# Patient Record
Sex: FEMALE | Race: WHITE
[De-identification: names, ages, dates, MRNs, and addresses within clinical notes are randomized per-mention and may not be internally consistent; named-entity substitution may affect disease eponyms.]

---

## 2017-03-03 ENCOUNTER — APPOINTMENT (OUTPATIENT)
Dept: CARDIOLOGY | Facility: CLINIC | Age: 72
End: 2017-03-03

## 2017-03-03 VITALS — DIASTOLIC BLOOD PRESSURE: 62 MMHG | SYSTOLIC BLOOD PRESSURE: 112 MMHG

## 2017-08-11 ENCOUNTER — APPOINTMENT (OUTPATIENT)
Dept: CARDIOLOGY | Facility: CLINIC | Age: 72
End: 2017-08-11

## 2017-08-11 VITALS — DIASTOLIC BLOOD PRESSURE: 82 MMHG | SYSTOLIC BLOOD PRESSURE: 124 MMHG

## 2017-08-11 VITALS — BODY MASS INDEX: 22.83 KG/M2 | WEIGHT: 133 LBS

## 2018-01-26 ENCOUNTER — INPATIENT (INPATIENT)
Facility: HOSPITAL | Age: 73
LOS: 3 days | Discharge: HOME | End: 2018-01-30
Attending: INTERNAL MEDICINE

## 2018-01-26 ENCOUNTER — APPOINTMENT (OUTPATIENT)
Dept: CARDIOLOGY | Facility: CLINIC | Age: 73
End: 2018-01-26

## 2018-01-26 VITALS
BODY MASS INDEX: 23.22 KG/M2 | HEART RATE: 58 BPM | DIASTOLIC BLOOD PRESSURE: 80 MMHG | HEIGHT: 64 IN | SYSTOLIC BLOOD PRESSURE: 144 MMHG | WEIGHT: 136 LBS

## 2018-02-02 DIAGNOSIS — I48.92 UNSPECIFIED ATRIAL FLUTTER: ICD-10-CM

## 2018-02-02 DIAGNOSIS — Z98.890 OTHER SPECIFIED POSTPROCEDURAL STATES: ICD-10-CM

## 2018-02-02 DIAGNOSIS — I10 ESSENTIAL (PRIMARY) HYPERTENSION: ICD-10-CM

## 2018-02-02 DIAGNOSIS — I45.19 OTHER RIGHT BUNDLE-BRANCH BLOCK: ICD-10-CM

## 2018-02-02 DIAGNOSIS — E78.5 HYPERLIPIDEMIA, UNSPECIFIED: ICD-10-CM

## 2018-02-02 DIAGNOSIS — I44.2 ATRIOVENTRICULAR BLOCK, COMPLETE: ICD-10-CM

## 2018-02-02 DIAGNOSIS — I49.5 SICK SINUS SYNDROME: ICD-10-CM

## 2018-02-02 DIAGNOSIS — Z79.01 LONG TERM (CURRENT) USE OF ANTICOAGULANTS: ICD-10-CM

## 2018-02-04 DIAGNOSIS — I48.91 UNSPECIFIED ATRIAL FIBRILLATION: ICD-10-CM

## 2018-02-04 DIAGNOSIS — I45.9 CONDUCTION DISORDER, UNSPECIFIED: ICD-10-CM

## 2018-02-13 ENCOUNTER — APPOINTMENT (OUTPATIENT)
Dept: CARDIOLOGY | Facility: CLINIC | Age: 73
End: 2018-02-13

## 2018-02-13 VITALS — SYSTOLIC BLOOD PRESSURE: 104 MMHG | DIASTOLIC BLOOD PRESSURE: 60 MMHG

## 2018-03-15 ENCOUNTER — APPOINTMENT (OUTPATIENT)
Dept: CARDIOLOGY | Facility: CLINIC | Age: 73
End: 2018-03-15

## 2018-03-15 VITALS — DIASTOLIC BLOOD PRESSURE: 70 MMHG | SYSTOLIC BLOOD PRESSURE: 138 MMHG

## 2018-03-19 ENCOUNTER — APPOINTMENT (OUTPATIENT)
Dept: CARDIOLOGY | Facility: CLINIC | Age: 73
End: 2018-03-19

## 2018-05-10 PROBLEM — Z98.890 S/P MITRAL VALVE REPAIR: Status: ACTIVE | Noted: 2018-05-10

## 2018-05-10 PROBLEM — I34.0 MITRAL VALVE INSUFFICIENCY: Status: ACTIVE | Noted: 2018-05-10

## 2018-05-14 ENCOUNTER — FORM ENCOUNTER (OUTPATIENT)
Age: 73
End: 2018-05-14

## 2018-05-15 ENCOUNTER — APPOINTMENT (OUTPATIENT)
Dept: CARDIOTHORACIC SURGERY | Facility: CLINIC | Age: 73
End: 2018-05-15
Payer: MEDICARE

## 2018-05-15 ENCOUNTER — OUTPATIENT (OUTPATIENT)
Dept: OUTPATIENT SERVICES | Facility: HOSPITAL | Age: 73
LOS: 1 days | End: 2018-05-15
Payer: MEDICARE

## 2018-05-15 VITALS
OXYGEN SATURATION: 96 % | RESPIRATION RATE: 18 BRPM | DIASTOLIC BLOOD PRESSURE: 70 MMHG | HEIGHT: 62 IN | BODY MASS INDEX: 25.58 KG/M2 | WEIGHT: 139 LBS | TEMPERATURE: 96.9 F | SYSTOLIC BLOOD PRESSURE: 128 MMHG | HEART RATE: 65 BPM

## 2018-05-15 DIAGNOSIS — I34.0 NONRHEUMATIC MITRAL (VALVE) INSUFFICIENCY: ICD-10-CM

## 2018-05-15 DIAGNOSIS — I05.1 RHEUMATIC MITRAL INSUFFICIENCY: ICD-10-CM

## 2018-05-15 DIAGNOSIS — Z98.890 OTHER SPECIFIED POSTPROCEDURAL STATES: ICD-10-CM

## 2018-05-15 PROCEDURE — 99205 OFFICE O/P NEW HI 60 MIN: CPT

## 2018-05-15 PROCEDURE — 93320 DOPPLER ECHO COMPLETE: CPT | Mod: 26

## 2018-05-15 PROCEDURE — 93312 ECHO TRANSESOPHAGEAL: CPT | Mod: 26

## 2018-05-15 PROCEDURE — 93312 ECHO TRANSESOPHAGEAL: CPT

## 2018-05-15 PROCEDURE — 93325 DOPPLER ECHO COLOR FLOW MAPG: CPT | Mod: 26

## 2018-05-24 ENCOUNTER — OUTPATIENT (OUTPATIENT)
Dept: OUTPATIENT SERVICES | Facility: HOSPITAL | Age: 73
LOS: 1 days | Discharge: HOME | End: 2018-05-24

## 2018-05-24 DIAGNOSIS — R06.00 DYSPNEA, UNSPECIFIED: ICD-10-CM

## 2018-06-26 ENCOUNTER — APPOINTMENT (OUTPATIENT)
Dept: CARDIOLOGY | Facility: CLINIC | Age: 73
End: 2018-06-26

## 2018-06-26 VITALS
DIASTOLIC BLOOD PRESSURE: 76 MMHG | BODY MASS INDEX: 25.61 KG/M2 | SYSTOLIC BLOOD PRESSURE: 116 MMHG | WEIGHT: 140 LBS | HEART RATE: 64 BPM | OXYGEN SATURATION: 97 %

## 2018-06-27 ENCOUNTER — APPOINTMENT (OUTPATIENT)
Dept: CARDIOLOGY | Facility: CLINIC | Age: 73
End: 2018-06-27

## 2018-06-27 VITALS — DIASTOLIC BLOOD PRESSURE: 74 MMHG | OXYGEN SATURATION: 96 % | SYSTOLIC BLOOD PRESSURE: 128 MMHG | HEART RATE: 59 BPM

## 2018-08-03 ENCOUNTER — APPOINTMENT (OUTPATIENT)
Dept: CARDIOLOGY | Facility: CLINIC | Age: 73
End: 2018-08-03

## 2018-08-03 VITALS
SYSTOLIC BLOOD PRESSURE: 154 MMHG | HEIGHT: 62 IN | HEART RATE: 62 BPM | DIASTOLIC BLOOD PRESSURE: 78 MMHG | OXYGEN SATURATION: 98 % | BODY MASS INDEX: 25.76 KG/M2 | WEIGHT: 140 LBS

## 2018-08-08 ENCOUNTER — OUTPATIENT (OUTPATIENT)
Dept: OUTPATIENT SERVICES | Facility: HOSPITAL | Age: 73
LOS: 1 days | Discharge: HOME | End: 2018-08-08

## 2018-08-08 DIAGNOSIS — R07.9 CHEST PAIN, UNSPECIFIED: ICD-10-CM

## 2018-08-08 DIAGNOSIS — Z95.0 PRESENCE OF CARDIAC PACEMAKER: ICD-10-CM

## 2018-08-08 DIAGNOSIS — R06.02 SHORTNESS OF BREATH: ICD-10-CM

## 2018-08-22 DIAGNOSIS — R00.0 TACHYCARDIA, UNSPECIFIED: ICD-10-CM

## 2018-10-23 ENCOUNTER — CLINICAL ADVICE (OUTPATIENT)
Age: 73
End: 2018-10-23

## 2019-02-15 ENCOUNTER — APPOINTMENT (OUTPATIENT)
Dept: CARDIOLOGY | Facility: CLINIC | Age: 74
End: 2019-02-15

## 2019-02-15 VITALS — DIASTOLIC BLOOD PRESSURE: 80 MMHG | SYSTOLIC BLOOD PRESSURE: 138 MMHG

## 2019-02-15 DIAGNOSIS — E78.00 PURE HYPERCHOLESTEROLEMIA, UNSPECIFIED: ICD-10-CM

## 2019-02-15 RX ORDER — METOPROLOL TARTRATE 25 MG/1
25 TABLET, FILM COATED ORAL AT BEDTIME
Refills: 0 | Status: DISCONTINUED | COMMUNITY
End: 2019-02-15

## 2019-02-15 NOTE — PHYSICAL EXAM
[General Appearance - Well Developed] : well developed [Normal Appearance] : normal appearance [Well Groomed] : well groomed [General Appearance - Well Nourished] : well nourished [No Deformities] : no deformities [General Appearance - In No Acute Distress] : no acute distress [Heart Rate And Rhythm] : heart rate and rhythm were normal [Heart Sounds] : normal S1 and S2 [Murmurs] : no murmurs present [Respiration, Rhythm And Depth] : normal respiratory rhythm and effort [Exaggerated Use Of Accessory Muscles For Inspiration] : no accessory muscle use [Auscultation Breath Sounds / Voice Sounds] : lungs were clear to auscultation bilaterally [Abdomen Soft] : soft [Abdomen Tenderness] : non-tender [Abdomen Mass (___ Cm)] : no abdominal mass palpated [Nail Clubbing] : no clubbing of the fingernails [Cyanosis, Localized] : no localized cyanosis [Petechial Hemorrhages (___cm)] : no petechial hemorrhages [] : no ischemic changes [Normal Conjunctiva] : the conjunctiva exhibited no abnormalities [Eyelids - No Xanthelasma] : the eyelids demonstrated no xanthelasmas [Normal Oral Mucosa] : normal oral mucosa [No Oral Pallor] : no oral pallor [No Oral Cyanosis] : no oral cyanosis [Normal Jugular Venous A Waves Present] : normal jugular venous A waves present [Normal Jugular Venous V Waves Present] : normal jugular venous V waves present [No Jugular Venous White A Waves] : no jugular venous white A waves [Abnormal Walk] : normal gait [Gait - Sufficient For Exercise Testing] : the gait was sufficient for exercise testing

## 2019-02-15 NOTE — HISTORY OF PRESENT ILLNESS
[None] : The patient complains of no symptoms [SOB] : dyspnea [Palpitations] : no palpitations [Erythema at Site] : no erythema at device site [Swelling at Site] : no swelling at device site [de-identified] : Patient with history of MVR MAZE, AF/AFL s/p AF ablation x2. Patient is doing great on flecanide; In NSR. Pt came in for frequent eye bleeding. However pt developed SOB, dizziness. Pt found to have intermittent  CHB. Pt was highly asymptotic. \par \par Pt is doing very well. C/o some SOB and SZYMANSKI\par \par CTA 8/18 - normal\par \par creatine 1.03\par \par \par \par Nuclear 6/17/17 - normal EF 60%

## 2019-02-15 NOTE — PROCEDURE
[No] : not [2nd Degree Block] : second degree block [Pacemaker] : pacemaker [DDDR] : DDDR [Longevity: ___ months] : The estimated remaining battery life is [unfilled] months [Threshold Testing Performed] : Threshold testing was performed [Lead Imp:  ___ohms] : lead impedance was [unfilled] ohms [Sensing Amplitude ___mv] : sensing amplitude was [unfilled] mv [___V @] : [unfilled] V [___ ms] : [unfilled] ms [None] : none [Pace ___ %] : Pace [unfilled]% [de-identified] : [facility] [de-identified] : L311 [de-identified] : 60 [de-identified] : Normal function. several short SVT/AT

## 2019-02-15 NOTE — ASSESSMENT
[FreeTextEntry1] : Patient with history of MVR MAZE, AF/AFL s/p AF ablation x2. Patient is doing great on flecanide and meteopolol\par \par SOB - no further arrhythmia\par - pt FU with pulmonary

## 2019-08-09 ENCOUNTER — APPOINTMENT (OUTPATIENT)
Dept: CARDIOLOGY | Facility: CLINIC | Age: 74
End: 2019-08-09
Payer: MEDICARE

## 2019-08-09 VITALS — BODY MASS INDEX: 24.33 KG/M2 | WEIGHT: 133 LBS | DIASTOLIC BLOOD PRESSURE: 71 MMHG | SYSTOLIC BLOOD PRESSURE: 138 MMHG

## 2019-08-09 PROCEDURE — 99213 OFFICE O/P EST LOW 20 MIN: CPT

## 2019-08-09 PROCEDURE — 93280 PM DEVICE PROGR EVAL DUAL: CPT

## 2019-09-08 NOTE — PHYSICAL EXAM
[General Appearance - Well Developed] : well developed [Normal Appearance] : normal appearance [Well Groomed] : well groomed [No Deformities] : no deformities [General Appearance - Well Nourished] : well nourished [General Appearance - In No Acute Distress] : no acute distress [Heart Rate And Rhythm] : heart rate and rhythm were normal [Heart Sounds] : normal S1 and S2 [Murmurs] : no murmurs present [Respiration, Rhythm And Depth] : normal respiratory rhythm and effort [Exaggerated Use Of Accessory Muscles For Inspiration] : no accessory muscle use [Auscultation Breath Sounds / Voice Sounds] : lungs were clear to auscultation bilaterally [Abdomen Soft] : soft [Abdomen Tenderness] : non-tender [Abdomen Mass (___ Cm)] : no abdominal mass palpated [Nail Clubbing] : no clubbing of the fingernails [Cyanosis, Localized] : no localized cyanosis [Petechial Hemorrhages (___cm)] : no petechial hemorrhages [] : no ischemic changes [Eyelids - No Xanthelasma] : the eyelids demonstrated no xanthelasmas [Normal Conjunctiva] : the conjunctiva exhibited no abnormalities [Normal Oral Mucosa] : normal oral mucosa [No Oral Pallor] : no oral pallor [No Oral Cyanosis] : no oral cyanosis [Normal Jugular Venous A Waves Present] : normal jugular venous A waves present [Normal Jugular Venous V Waves Present] : normal jugular venous V waves present [No Jugular Venous White A Waves] : no jugular venous white A waves [Abnormal Walk] : normal gait [Gait - Sufficient For Exercise Testing] : the gait was sufficient for exercise testing [Left Infraclavicular] : left infraclavicular area [Clean] : clean [Dry] : dry [Erythema] : not erythematous

## 2019-09-08 NOTE — PROCEDURE
[No] : not [Pacemaker] : pacemaker [Sinus Bradycardia] : sinus bradycardia [DDDR] : DDDR [Magnet Rate: ___ Ppm] : magnet rate was [unfilled] Ppm [Longevity: ___ months] : The estimated remaining battery life is [unfilled] months [Threshold Testing Performed] : Threshold testing was performed [Lead Imp:  ___ohms] : lead impedance was [unfilled] ohms [Sensing Amplitude ___mv] : sensing amplitude was [unfilled] mv [___V @] : [unfilled] V [___ ms] : [unfilled] ms [Counters Reset] : the counters were reset [Asense-Vpace ___ %] : Asense-Vpace [unfilled]% [Apace-Vsense ___ %] : Apace-Vsense [unfilled]% [Programmed for Longevity] : output reprogrammed for improved battery longevity [de-identified] : [facility]  [de-identified] : L311 [de-identified] : [id number] [de-identified] : 01/29/2018 [de-identified] : Normal Device function

## 2019-09-08 NOTE — HISTORY OF PRESENT ILLNESS
pt arrives w/ c/o chest pain and SOB x 3 days. pt states diagnosed with bronchitis and placed on abx but states no improvement and symptoms getting worst. pt states hx of asthma took her inhalers with no relief and still feels like she is wheezing. pt labs sent resp even and unlabored. waiting further orders will continue to monitor. [None] : The patient complains of no symptoms [SOB] : dyspnea [Palpitations] : no palpitations [Erythema at Site] : no erythema at device site [Swelling at Site] : no swelling at device site [de-identified] : Cardiologist: Dr. Taylor\par \par Patient with history of MVR MAZE, AF/AFL s/p AF ablation x2. Patient is doing great on flecanide . Had a dual chamber PPM implanted for symptomatic   intermittent  CHB on  1/29/2018 \par \par Pt is doing very well. C/o same SOB and SZYMANSKI\par \par CTA 8/18 - normal\par \par creatine 1.03\par \par \par \par Nuclear 6/17/17 - normal EF 60%

## 2019-09-08 NOTE — REVIEW OF SYSTEMS
[Negative] : Heme/Lymph [Recent Weight Loss (___ Lbs)] : recent [unfilled] ~Ulb weight loss [Dyspnea on exertion] : dyspnea during exertion [Shortness Of Breath] : no shortness of breath

## 2019-09-08 NOTE — ASSESSMENT
[FreeTextEntry1] : Patient with history of MVR MAZE, AF/AFL s/p AF ablation x2. Patient is doing great on flecanide and meteopolol\par \par SOB - no further arrhythmia\par - pt FU with pulmonary - Dr. Christopher Sayed - put her on inhaler- help somewhat\par Device interrogation showed normal device function\par no events\par c/w with home monitoring\par c/w with xarelto - no symptoms of bleeding\par follow up 6 momths\par

## 2019-09-24 ENCOUNTER — APPOINTMENT (OUTPATIENT)
Dept: CARDIOLOGY | Facility: CLINIC | Age: 74
End: 2019-09-24
Payer: MEDICARE

## 2019-09-24 PROCEDURE — 93296 REM INTERROG EVL PM/IDS: CPT

## 2019-09-24 PROCEDURE — 93294 REM INTERROG EVL PM/LDLS PM: CPT

## 2019-12-24 ENCOUNTER — APPOINTMENT (OUTPATIENT)
Dept: CARDIOLOGY | Facility: CLINIC | Age: 74
End: 2019-12-24
Payer: MEDICARE

## 2019-12-24 PROCEDURE — 93294 REM INTERROG EVL PM/LDLS PM: CPT

## 2019-12-24 PROCEDURE — 93296 REM INTERROG EVL PM/IDS: CPT

## 2020-03-24 ENCOUNTER — APPOINTMENT (OUTPATIENT)
Dept: CARDIOLOGY | Facility: CLINIC | Age: 75
End: 2020-03-24
Payer: MEDICARE

## 2020-03-24 PROCEDURE — 93296 REM INTERROG EVL PM/IDS: CPT

## 2020-03-24 PROCEDURE — 93294 REM INTERROG EVL PM/LDLS PM: CPT

## 2020-05-01 ENCOUNTER — APPOINTMENT (OUTPATIENT)
Dept: CARDIOLOGY | Facility: CLINIC | Age: 75
End: 2020-05-01

## 2020-05-01 ENCOUNTER — APPOINTMENT (OUTPATIENT)
Dept: CARDIOLOGY | Facility: CLINIC | Age: 75
End: 2020-05-01
Payer: MEDICARE

## 2020-05-01 DIAGNOSIS — I10 ESSENTIAL (PRIMARY) HYPERTENSION: ICD-10-CM

## 2020-05-01 PROCEDURE — 99213 OFFICE O/P EST LOW 20 MIN: CPT | Mod: 95

## 2020-05-01 NOTE — HISTORY OF PRESENT ILLNESS
[None] : The patient complains of no symptoms [Palpitations] : no palpitations [Erythema at Site] : no erythema at device site [SOB] : no dyspnea [Swelling at Site] : no swelling at device site [de-identified] : Ms. CURTIS WATERS has given me verbal authorization to provide the tele services\par Verbal consent given on 05/01/2020  by the patient.\par \par This visit was provided via telehealth using real-time 2-way audio visual technology. The patient,  Ms. CURTIS WATERS,   was located at home,  [address] [place] [address]\[place] [phone], at the time of the visit. \par \par The patient, Ms. CURTIS WATERS  and Provider participated in the telehealth encounter. \par \par I have spent 16 minutes speaking with or face-to-face discussing\par \par \par \par \par Cardiologist: Dr. Taylor\par \par Patient with history of MVR MAZE, AF/AFL s/p AF ablation x2. Patient is doing great on flecanide . Had a dual chamber PPM implanted for symptomatic   intermittent  CHB on  1/29/2018 \par \par Pt is doing very well. C/o same SOB and SZYMANSKI, no bleeding\par \par CTA 8/18 - normal\par labs reviewed\par \par \par \par Nuclear 6/17/17 - normal EF 60%

## 2020-05-01 NOTE — REVIEW OF SYSTEMS
[Shortness Of Breath] : no shortness of breath [Recent Weight Loss (___ Lbs)] : recent [unfilled] ~Ulb weight loss [Dyspnea on exertion] : dyspnea during exertion [Negative] : Heme/Lymph

## 2020-05-01 NOTE — PHYSICAL EXAM
[General Appearance - Well Developed] : well developed [Normal Appearance] : normal appearance [General Appearance - Well Nourished] : well nourished [Well Groomed] : well groomed [General Appearance - In No Acute Distress] : no acute distress [No Deformities] : no deformities [Normal Conjunctiva] : the conjunctiva exhibited no abnormalities [Normal Oral Mucosa] : normal oral mucosa [Eyelids - No Xanthelasma] : the eyelids demonstrated no xanthelasmas [No Oral Pallor] : no oral pallor [No Oral Cyanosis] : no oral cyanosis [Normal Jugular Venous A Waves Present] : normal jugular venous A waves present [No Jugular Venous White A Waves] : no jugular venous white A waves [Normal Jugular Venous V Waves Present] : normal jugular venous V waves present [Abdomen Tenderness] : non-tender [Abdomen Soft] : soft [Abdomen Mass (___ Cm)] : no abdominal mass palpated [Nail Clubbing] : no clubbing of the fingernails [Cyanosis, Localized] : no localized cyanosis [Petechial Hemorrhages (___cm)] : no petechial hemorrhages [] : no ischemic changes [Left Infraclavicular] : left infraclavicular area [Dry] : dry [Clean] : clean [Erythema] : not erythematous

## 2020-05-01 NOTE — ASSESSMENT
[FreeTextEntry1] : Patient with history of MVR MAZE, AF/AFL s/p AF ablation x2. Patient is doing great on flecanide and meteopolol\par \par SOB - no further arrhythmia\par - pt FU with pulmonary - Dr. Ashwin Zurita \par Device interrogation showed normal device function\par no events; no AF\par c/w with home monitoring\par c/w with xarelto - no symptoms of bleeding\par follow up 6 momths\par

## 2020-06-16 LAB
ALBUMIN SERPL ELPH-MCNC: 4 G/DL
ALBUMIN SERPL ELPH-MCNC: 4.3 G/DL
ALP BLD-CCNC: 70 U/L
ALP BLD-CCNC: 75 U/L
ALT SERPL-CCNC: 21 U/L
ALT SERPL-CCNC: 25 U/L
ANION GAP SERPL CALC-SCNC: 14 MMOL/L
ANION GAP SERPL CALC-SCNC: 19 MMOL/L
AST SERPL-CCNC: 23 U/L
AST SERPL-CCNC: 24 U/L
BASOPHILS # BLD AUTO: 0.07 K/UL
BASOPHILS # BLD AUTO: 0.09 K/UL
BASOPHILS NFR BLD AUTO: 0.9 %
BASOPHILS NFR BLD AUTO: 1.1 %
BILIRUB SERPL-MCNC: 0.5 MG/DL
BILIRUB SERPL-MCNC: 0.7 MG/DL
BUN SERPL-MCNC: 22 MG/DL
BUN SERPL-MCNC: 23 MG/DL
CALCIUM SERPL-MCNC: 9.1 MG/DL
CALCIUM SERPL-MCNC: 9.6 MG/DL
CHLORIDE SERPL-SCNC: 100 MMOL/L
CHLORIDE SERPL-SCNC: 103 MMOL/L
CHOLEST SERPL-MCNC: 124 MG/DL
CHOLEST SERPL-MCNC: 129 MG/DL
CHOLEST/HDLC SERPL: 2.3 RATIO
CHOLEST/HDLC SERPL: 2.5 RATIO
CO2 SERPL-SCNC: 21 MMOL/L
CO2 SERPL-SCNC: 23 MMOL/L
CREAT SERPL-MCNC: 1 MG/DL
CREAT SERPL-MCNC: 1.3 MG/DL
EOSINOPHIL # BLD AUTO: 0.09 K/UL
EOSINOPHIL # BLD AUTO: 0.13 K/UL
EOSINOPHIL NFR BLD AUTO: 1.2 %
EOSINOPHIL NFR BLD AUTO: 1.6 %
GLUCOSE SERPL-MCNC: 86 MG/DL
GLUCOSE SERPL-MCNC: 94 MG/DL
HCT VFR BLD CALC: 38.2 %
HCT VFR BLD CALC: 42.1 %
HDLC SERPL-MCNC: 50 MG/DL
HDLC SERPL-MCNC: 55 MG/DL
HGB BLD-MCNC: 12.3 G/DL
HGB BLD-MCNC: 12.9 G/DL
IMM GRANULOCYTES NFR BLD AUTO: 0.1 %
IMM GRANULOCYTES NFR BLD AUTO: 0.1 %
LDLC SERPL CALC-MCNC: 60 MG/DL
LDLC SERPL CALC-MCNC: 61 MG/DL
LYMPHOCYTES # BLD AUTO: 2.08 K/UL
LYMPHOCYTES # BLD AUTO: 3.43 K/UL
LYMPHOCYTES NFR BLD AUTO: 27.3 %
LYMPHOCYTES NFR BLD AUTO: 42.5 %
MAN DIFF?: NORMAL
MAN DIFF?: NORMAL
MCHC RBC-ENTMCNC: 30.6 G/DL
MCHC RBC-ENTMCNC: 30.9 PG
MCHC RBC-ENTMCNC: 31 PG
MCHC RBC-ENTMCNC: 32.2 G/DL
MCV RBC AUTO: 100.7 FL
MCV RBC AUTO: 96.2 FL
MONOCYTES # BLD AUTO: 0.68 K/UL
MONOCYTES # BLD AUTO: 0.78 K/UL
MONOCYTES NFR BLD AUTO: 8.9 %
MONOCYTES NFR BLD AUTO: 9.7 %
NEUTROPHILS # BLD AUTO: 3.64 K/UL
NEUTROPHILS # BLD AUTO: 4.69 K/UL
NEUTROPHILS NFR BLD AUTO: 45 %
NEUTROPHILS NFR BLD AUTO: 61.6 %
PLATELET # BLD AUTO: 185 K/UL
PLATELET # BLD AUTO: 217 K/UL
POTASSIUM SERPL-SCNC: 4.7 MMOL/L
POTASSIUM SERPL-SCNC: 4.8 MMOL/L
PROT SERPL-MCNC: 6.8 G/DL
PROT SERPL-MCNC: 6.8 G/DL
RBC # BLD: 3.97 M/UL
RBC # BLD: 4.18 M/UL
RBC # FLD: 13.9 %
RBC # FLD: 14.2 %
SODIUM SERPL-SCNC: 137 MMOL/L
SODIUM SERPL-SCNC: 143 MMOL/L
TRIGL SERPL-MCNC: 100 MG/DL
TRIGL SERPL-MCNC: 112 MG/DL
WBC # FLD AUTO: 7.62 K/UL
WBC # FLD AUTO: 8.08 K/UL

## 2020-06-23 ENCOUNTER — APPOINTMENT (OUTPATIENT)
Dept: CARDIOLOGY | Facility: CLINIC | Age: 75
End: 2020-06-23
Payer: MEDICARE

## 2020-06-23 PROCEDURE — 93296 REM INTERROG EVL PM/IDS: CPT

## 2020-06-23 PROCEDURE — 93294 REM INTERROG EVL PM/LDLS PM: CPT

## 2020-09-22 ENCOUNTER — APPOINTMENT (OUTPATIENT)
Dept: CARDIOLOGY | Facility: CLINIC | Age: 75
End: 2020-09-22
Payer: MEDICARE

## 2020-09-22 PROCEDURE — 93296 REM INTERROG EVL PM/IDS: CPT

## 2020-09-22 PROCEDURE — 93294 REM INTERROG EVL PM/LDLS PM: CPT

## 2020-11-17 ENCOUNTER — APPOINTMENT (OUTPATIENT)
Dept: CARDIOLOGY | Facility: CLINIC | Age: 75
End: 2020-11-17
Payer: MEDICARE

## 2020-11-17 VITALS
TEMPERATURE: 97.3 F | WEIGHT: 135 LBS | DIASTOLIC BLOOD PRESSURE: 79 MMHG | BODY MASS INDEX: 23.05 KG/M2 | SYSTOLIC BLOOD PRESSURE: 125 MMHG | HEIGHT: 64 IN

## 2020-11-17 PROCEDURE — 93280 PM DEVICE PROGR EVAL DUAL: CPT

## 2020-11-17 PROCEDURE — 99213 OFFICE O/P EST LOW 20 MIN: CPT

## 2020-11-17 RX ORDER — FLUTICASONE FUROATE 100 UG/1
100 POWDER RESPIRATORY (INHALATION) DAILY
Refills: 0 | Status: COMPLETED | COMMUNITY
End: 2020-11-17

## 2020-11-17 NOTE — PROCEDURE
[See Scanned Paceart Report] : See scanned paceart report [See Device Printout] : See device printout [Pacemaker] : pacemaker [de-identified] : [place] [de-identified] : no AF

## 2020-11-17 NOTE — HISTORY OF PRESENT ILLNESS
[None] : The patient complains of no symptoms [Palpitations] : no palpitations [SOB] : no dyspnea [Erythema at Site] : no erythema at device site [Swelling at Site] : no swelling at device site [de-identified] : Cardiologist: Dr. Taylor\par \par Patient with history of MVR MAZE, AF/AFL s/p AF ablation x2. Patient is doing great on flecanide . Had a dual chamber PPM implanted for symptomatic   intermittent  CHB on  1/29/2018 \par \par Pt is doing very well. C/o same SOB and SZYMANSKI, no bleeding\par \par CTA 8/18 - normal\par labs reviewed\par \par \par \par Nuclear 6/17/17 - normal EF 60%

## 2020-11-17 NOTE — REVIEW OF SYSTEMS
[Recent Weight Loss (___ Lbs)] : recent [unfilled] ~Ulb weight loss [Dyspnea on exertion] : dyspnea during exertion [Negative] : Heme/Lymph [Shortness Of Breath] : no shortness of breath

## 2020-11-17 NOTE — PHYSICAL EXAM
[General Appearance - Well Developed] : well developed [Normal Appearance] : normal appearance [Well Groomed] : well groomed [General Appearance - Well Nourished] : well nourished [No Deformities] : no deformities [General Appearance - In No Acute Distress] : no acute distress [Left Infraclavicular] : left infraclavicular area [Clean] : clean [Dry] : dry [Abdomen Soft] : soft [Abdomen Tenderness] : non-tender [Abdomen Mass (___ Cm)] : no abdominal mass palpated [Nail Clubbing] : no clubbing of the fingernails [Cyanosis, Localized] : no localized cyanosis [Petechial Hemorrhages (___cm)] : no petechial hemorrhages [Normal Conjunctiva] : the conjunctiva exhibited no abnormalities [Eyelids - No Xanthelasma] : the eyelids demonstrated no xanthelasmas [Normal Oral Mucosa] : normal oral mucosa [No Oral Pallor] : no oral pallor [No Oral Cyanosis] : no oral cyanosis [Normal Jugular Venous A Waves Present] : normal jugular venous A waves present [Normal Jugular Venous V Waves Present] : normal jugular venous V waves present [No Jugular Venous White A Waves] : no jugular venous white A waves [Heart Rate And Rhythm] : heart rate and rhythm were normal [Heart Sounds] : normal S1 and S2 [Murmurs] : no murmurs present [] : no respiratory distress [Respiration, Rhythm And Depth] : normal respiratory rhythm and effort [Exaggerated Use Of Accessory Muscles For Inspiration] : no accessory muscle use [Auscultation Breath Sounds / Voice Sounds] : lungs were clear to auscultation bilaterally [Erythema] : not erythematous

## 2020-11-17 NOTE — ASSESSMENT
[FreeTextEntry1] : Patient with history of MVR MAZE, AF/AFL s/p AF ablation x2. Patient is doing great on flecanide and meteopolol\par \par SOB - no further arrhythmia\par - pt FU with pulmonary - Dr. Christopher Sayjakub \par Device interrogation showed normal device function\par AF - No AF\par - cont AAM\par - labs reviewed

## 2021-02-12 ENCOUNTER — APPOINTMENT (OUTPATIENT)
Dept: CARDIOLOGY | Facility: CLINIC | Age: 76
End: 2021-02-12
Payer: MEDICARE

## 2021-02-12 PROCEDURE — 93294 REM INTERROG EVL PM/LDLS PM: CPT

## 2021-02-12 PROCEDURE — 93296 REM INTERROG EVL PM/IDS: CPT

## 2021-03-05 ENCOUNTER — NON-APPOINTMENT (OUTPATIENT)
Age: 76
End: 2021-03-05

## 2021-04-13 DIAGNOSIS — J45.909 UNSPECIFIED ASTHMA, UNCOMPLICATED: ICD-10-CM

## 2021-05-18 ENCOUNTER — APPOINTMENT (OUTPATIENT)
Dept: CARDIOLOGY | Facility: CLINIC | Age: 76
End: 2021-05-18
Payer: MEDICARE

## 2021-05-18 ENCOUNTER — NON-APPOINTMENT (OUTPATIENT)
Age: 76
End: 2021-05-18

## 2021-05-18 PROCEDURE — 93294 REM INTERROG EVL PM/LDLS PM: CPT

## 2021-05-18 PROCEDURE — 93296 REM INTERROG EVL PM/IDS: CPT

## 2021-08-17 ENCOUNTER — APPOINTMENT (OUTPATIENT)
Dept: CARDIOLOGY | Facility: CLINIC | Age: 76
End: 2021-08-17
Payer: MEDICARE

## 2021-08-17 ENCOUNTER — NON-APPOINTMENT (OUTPATIENT)
Age: 76
End: 2021-08-17

## 2021-08-17 PROCEDURE — 93294 REM INTERROG EVL PM/LDLS PM: CPT

## 2021-08-17 PROCEDURE — 93296 REM INTERROG EVL PM/IDS: CPT

## 2021-10-08 ENCOUNTER — APPOINTMENT (OUTPATIENT)
Dept: CARDIOLOGY | Facility: CLINIC | Age: 76
End: 2021-10-08
Payer: MEDICARE

## 2021-10-08 VITALS
WEIGHT: 134 LBS | SYSTOLIC BLOOD PRESSURE: 139 MMHG | DIASTOLIC BLOOD PRESSURE: 73 MMHG | OXYGEN SATURATION: 99 % | BODY MASS INDEX: 22.88 KG/M2 | RESPIRATION RATE: 16 BRPM | HEART RATE: 68 BPM | TEMPERATURE: 98 F | HEIGHT: 64 IN

## 2021-10-08 PROCEDURE — 93280 PM DEVICE PROGR EVAL DUAL: CPT

## 2021-10-08 RX ORDER — FLUTICASONE FUROATE AND VILANTEROL TRIFENATATE 100; 25 UG/1; UG/1
100-25 POWDER RESPIRATORY (INHALATION) DAILY
Qty: 3 | Refills: 3 | Status: DISCONTINUED | COMMUNITY
Start: 2021-04-13 | End: 2021-10-08

## 2021-10-08 NOTE — HISTORY OF PRESENT ILLNESS
[Palpitations] : no palpitations [SOB] : no dyspnea [Erythema at Site] : no erythema at device site [Swelling at Site] : no swelling at device site [de-identified] : Pt returns for routine device interrogation\par \par \par   [FreeTextEntry1] : 76 y/o with pmh of  MVR MAZE, AF/AFL s/p AF ablation x2, recurrent Afib/ Fl , rhythm controlled with  Flecainide, CHB , she underwent a Dual Chamber PPM implantation on 1/29/2018 \par \par \par Nuclear 6/17/17 - normal EF 60%\par JAXSON (5/15/2018)- EF 55-60%, mod AR, mild MR , mild - mod TR\par \par PCP: Dr. Cota ( [phone]\par Cardiologist:  Dr. Christian\par

## 2021-10-08 NOTE — PROCEDURE
[No] : not [2nd Degree Block] : second degree block [Pacemaker] : pacemaker [DDDR] : DDDR [Threshold Testing Performed] : Threshold testing was performed [None] : none [Counters Reset] : the counters were reset [Pace ___ %] : Pace [unfilled]% [Longevity: ___ months] : The estimated remaining battery life is [unfilled] months [___ ms] : [unfilled] ms [Lead Imp:  ___ohms] : lead impedance was [unfilled] ohms [Sensing Amplitude ___mv] : sensing amplitude was [unfilled] mv [___V @] : [unfilled] V [de-identified] : [employer] [de-identified] : L311 [de-identified] : [id number] [de-identified] : 1/29/2018 [de-identified] : 60 [de-identified] : Normal function. several short SVT/AT

## 2021-10-08 NOTE — ASSESSMENT
[FreeTextEntry1] : PPM interrogation today showed : Normal Dual Chamber PPM function, All parameters stable, baseline rhythm - NSR  . NO arrhythmias recorded . \par   AP 29%\par    38%\par . \par On Xarelto for CVA prevention, no s/s bleeding reported \par \par Plan \par -Continue current medication regimen \par -Continue remote monitoring\par - BW will be done by Dr. Cota\par   will request copy of May 2021 result\par - She is scheduled for TTE with Dr. Ching next month\par - RTO in 9 mos\par \par

## 2021-10-08 NOTE — PHYSICAL EXAM
[General Appearance - Well Developed] : well developed [Normal Appearance] : normal appearance [Well Groomed] : well groomed [General Appearance - Well Nourished] : well nourished [No Deformities] : no deformities [General Appearance - In No Acute Distress] : no acute distress [Heart Rate And Rhythm] : heart rate and rhythm were normal [Heart Sounds] : normal S1 and S2 [Murmurs] : no murmurs present [Respiration, Rhythm And Depth] : normal respiratory rhythm and effort [Exaggerated Use Of Accessory Muscles For Inspiration] : no accessory muscle use [Auscultation Breath Sounds / Voice Sounds] : lungs were clear to auscultation bilaterally [Left Infraclavicular] : left infraclavicular area [Clean] : clean [Dry] : dry [Well-Healed] : well-healed [Abdomen Soft] : soft [Abdomen Tenderness] : non-tender [Abdomen Mass (___ Cm)] : no abdominal mass palpated [Nail Clubbing] : no clubbing of the fingernails [Cyanosis, Localized] : no localized cyanosis [Petechial Hemorrhages (___cm)] : no petechial hemorrhages [] : no ischemic changes [Normal Conjunctiva] : the conjunctiva exhibited no abnormalities [Eyelids - No Xanthelasma] : the eyelids demonstrated no xanthelasmas [Normal Oral Mucosa] : normal oral mucosa [No Oral Pallor] : no oral pallor [No Oral Cyanosis] : no oral cyanosis [Normal Jugular Venous A Waves Present] : normal jugular venous A waves present [Normal Jugular Venous V Waves Present] : normal jugular venous V waves present [No Jugular Venous White A Waves] : no jugular venous white A waves [Abnormal Walk] : normal gait [Gait - Sufficient For Exercise Testing] : the gait was sufficient for exercise testing

## 2022-01-10 ENCOUNTER — NON-APPOINTMENT (OUTPATIENT)
Age: 77
End: 2022-01-10

## 2022-01-10 ENCOUNTER — APPOINTMENT (OUTPATIENT)
Dept: CARDIOLOGY | Facility: CLINIC | Age: 77
End: 2022-01-10
Payer: MEDICARE

## 2022-01-10 PROCEDURE — 93296 REM INTERROG EVL PM/IDS: CPT

## 2022-01-10 PROCEDURE — 93294 REM INTERROG EVL PM/LDLS PM: CPT

## 2022-02-25 ENCOUNTER — EMERGENCY (EMERGENCY)
Facility: HOSPITAL | Age: 77
LOS: 0 days | Discharge: HOME | End: 2022-02-26
Attending: EMERGENCY MEDICINE | Admitting: EMERGENCY MEDICINE
Payer: MEDICARE

## 2022-02-25 VITALS
OXYGEN SATURATION: 95 % | DIASTOLIC BLOOD PRESSURE: 79 MMHG | RESPIRATION RATE: 16 BRPM | WEIGHT: 134.92 LBS | HEART RATE: 69 BPM | SYSTOLIC BLOOD PRESSURE: 194 MMHG | TEMPERATURE: 99 F

## 2022-02-25 DIAGNOSIS — Z95.0 PRESENCE OF CARDIAC PACEMAKER: ICD-10-CM

## 2022-02-25 DIAGNOSIS — H55.00 UNSPECIFIED NYSTAGMUS: ICD-10-CM

## 2022-02-25 DIAGNOSIS — Z79.01 LONG TERM (CURRENT) USE OF ANTICOAGULANTS: ICD-10-CM

## 2022-02-25 DIAGNOSIS — H81.399 OTHER PERIPHERAL VERTIGO, UNSPECIFIED EAR: ICD-10-CM

## 2022-02-25 DIAGNOSIS — R11.0 NAUSEA: ICD-10-CM

## 2022-02-25 DIAGNOSIS — R42 DIZZINESS AND GIDDINESS: ICD-10-CM

## 2022-02-25 DIAGNOSIS — I10 ESSENTIAL (PRIMARY) HYPERTENSION: ICD-10-CM

## 2022-02-25 DIAGNOSIS — R11.2 NAUSEA WITH VOMITING, UNSPECIFIED: ICD-10-CM

## 2022-02-25 DIAGNOSIS — E78.5 HYPERLIPIDEMIA, UNSPECIFIED: ICD-10-CM

## 2022-02-25 DIAGNOSIS — I48.91 UNSPECIFIED ATRIAL FIBRILLATION: ICD-10-CM

## 2022-02-25 PROCEDURE — 93010 ELECTROCARDIOGRAM REPORT: CPT

## 2022-02-25 PROCEDURE — 99285 EMERGENCY DEPT VISIT HI MDM: CPT

## 2022-02-26 VITALS
HEART RATE: 87 BPM | SYSTOLIC BLOOD PRESSURE: 187 MMHG | RESPIRATION RATE: 17 BRPM | OXYGEN SATURATION: 98 % | DIASTOLIC BLOOD PRESSURE: 68 MMHG

## 2022-02-26 LAB
ALBUMIN SERPL ELPH-MCNC: 4.3 G/DL — SIGNIFICANT CHANGE UP (ref 3.5–5.2)
ALP SERPL-CCNC: 79 U/L — SIGNIFICANT CHANGE UP (ref 30–115)
ALT FLD-CCNC: 25 U/L — SIGNIFICANT CHANGE UP (ref 0–41)
ANION GAP SERPL CALC-SCNC: 14 MMOL/L — SIGNIFICANT CHANGE UP (ref 7–14)
APTT BLD: 28 SEC — SIGNIFICANT CHANGE UP (ref 27–39.2)
AST SERPL-CCNC: 27 U/L — SIGNIFICANT CHANGE UP (ref 0–41)
BASOPHILS # BLD AUTO: 0.06 K/UL — SIGNIFICANT CHANGE UP (ref 0–0.2)
BASOPHILS NFR BLD AUTO: 0.7 % — SIGNIFICANT CHANGE UP (ref 0–1)
BILIRUB SERPL-MCNC: 0.4 MG/DL — SIGNIFICANT CHANGE UP (ref 0.2–1.2)
BUN SERPL-MCNC: 33 MG/DL — HIGH (ref 10–20)
CALCIUM SERPL-MCNC: 9.5 MG/DL — SIGNIFICANT CHANGE UP (ref 8.5–10.1)
CHLORIDE SERPL-SCNC: 100 MMOL/L — SIGNIFICANT CHANGE UP (ref 98–110)
CO2 SERPL-SCNC: 22 MMOL/L — SIGNIFICANT CHANGE UP (ref 17–32)
CREAT SERPL-MCNC: 1.2 MG/DL — SIGNIFICANT CHANGE UP (ref 0.7–1.5)
EOSINOPHIL # BLD AUTO: 0.17 K/UL — SIGNIFICANT CHANGE UP (ref 0–0.7)
EOSINOPHIL NFR BLD AUTO: 1.9 % — SIGNIFICANT CHANGE UP (ref 0–8)
GLUCOSE SERPL-MCNC: 130 MG/DL — HIGH (ref 70–99)
HCT VFR BLD CALC: 35.9 % — LOW (ref 37–47)
HGB BLD-MCNC: 11.6 G/DL — LOW (ref 12–16)
IMM GRANULOCYTES NFR BLD AUTO: 0.3 % — SIGNIFICANT CHANGE UP (ref 0.1–0.3)
INR BLD: 1 RATIO — SIGNIFICANT CHANGE UP (ref 0.65–1.3)
LYMPHOCYTES # BLD AUTO: 2.74 K/UL — SIGNIFICANT CHANGE UP (ref 1.2–3.4)
LYMPHOCYTES # BLD AUTO: 30.2 % — SIGNIFICANT CHANGE UP (ref 20.5–51.1)
MAGNESIUM SERPL-MCNC: 2 MG/DL — SIGNIFICANT CHANGE UP (ref 1.8–2.4)
MCHC RBC-ENTMCNC: 30.7 PG — SIGNIFICANT CHANGE UP (ref 27–31)
MCHC RBC-ENTMCNC: 32.3 G/DL — SIGNIFICANT CHANGE UP (ref 32–37)
MCV RBC AUTO: 95 FL — SIGNIFICANT CHANGE UP (ref 81–99)
MONOCYTES # BLD AUTO: 0.65 K/UL — HIGH (ref 0.1–0.6)
MONOCYTES NFR BLD AUTO: 7.2 % — SIGNIFICANT CHANGE UP (ref 1.7–9.3)
NEUTROPHILS # BLD AUTO: 5.41 K/UL — SIGNIFICANT CHANGE UP (ref 1.4–6.5)
NEUTROPHILS NFR BLD AUTO: 59.7 % — SIGNIFICANT CHANGE UP (ref 42.2–75.2)
NRBC # BLD: 0 /100 WBCS — SIGNIFICANT CHANGE UP (ref 0–0)
PLATELET # BLD AUTO: 207 K/UL — SIGNIFICANT CHANGE UP (ref 130–400)
POTASSIUM SERPL-MCNC: 4.3 MMOL/L — SIGNIFICANT CHANGE UP (ref 3.5–5)
POTASSIUM SERPL-SCNC: 4.3 MMOL/L — SIGNIFICANT CHANGE UP (ref 3.5–5)
PROT SERPL-MCNC: 6.7 G/DL — SIGNIFICANT CHANGE UP (ref 6–8)
PROTHROM AB SERPL-ACNC: 11.5 SEC — SIGNIFICANT CHANGE UP (ref 9.95–12.87)
RBC # BLD: 3.78 M/UL — LOW (ref 4.2–5.4)
RBC # FLD: 13.7 % — SIGNIFICANT CHANGE UP (ref 11.5–14.5)
SODIUM SERPL-SCNC: 136 MMOL/L — SIGNIFICANT CHANGE UP (ref 135–146)
TROPONIN T SERPL-MCNC: <0.01 NG/ML — SIGNIFICANT CHANGE UP
WBC # BLD: 9.06 K/UL — SIGNIFICANT CHANGE UP (ref 4.8–10.8)
WBC # FLD AUTO: 9.06 K/UL — SIGNIFICANT CHANGE UP (ref 4.8–10.8)

## 2022-02-26 PROCEDURE — 70450 CT HEAD/BRAIN W/O DYE: CPT | Mod: 26,MA

## 2022-02-26 RX ORDER — METOCLOPRAMIDE HCL 10 MG
10 TABLET ORAL ONCE
Refills: 0 | Status: COMPLETED | OUTPATIENT
Start: 2022-02-26 | End: 2022-02-26

## 2022-02-26 RX ORDER — ONDANSETRON 8 MG/1
4 TABLET, FILM COATED ORAL ONCE
Refills: 0 | Status: COMPLETED | OUTPATIENT
Start: 2022-02-26 | End: 2022-02-26

## 2022-02-26 RX ORDER — SODIUM CHLORIDE 9 MG/ML
1000 INJECTION INTRAMUSCULAR; INTRAVENOUS; SUBCUTANEOUS ONCE
Refills: 0 | Status: COMPLETED | OUTPATIENT
Start: 2022-02-26 | End: 2022-02-26

## 2022-02-26 RX ORDER — MECLIZINE HCL 12.5 MG
1 TABLET ORAL
Qty: 21 | Refills: 0
Start: 2022-02-26 | End: 2022-03-04

## 2022-02-26 RX ORDER — MECLIZINE HCL 12.5 MG
50 TABLET ORAL ONCE
Refills: 0 | Status: DISCONTINUED | OUTPATIENT
Start: 2022-02-26 | End: 2022-02-26

## 2022-02-26 RX ADMIN — Medication 10 MILLIGRAM(S): at 00:38

## 2022-02-26 RX ADMIN — SODIUM CHLORIDE 1000 MILLILITER(S): 9 INJECTION INTRAMUSCULAR; INTRAVENOUS; SUBCUTANEOUS at 00:39

## 2022-02-26 RX ADMIN — ONDANSETRON 4 MILLIGRAM(S): 8 TABLET, FILM COATED ORAL at 00:38

## 2022-02-26 NOTE — ED PROVIDER NOTE - CLINICAL SUMMARY MEDICAL DECISION MAKING FREE TEXT BOX
76-year-old female history of hypertension dyslipidemia A. fib on Xarelto presenting for acute onset dizziness/room spinning sensation this evening.  Also associated with nausea and vomiting.  Symptoms worse with movement.  No hearing changes.  No numbness or focal weakness.  No fevers or chills, hearing changes.  Uncomfortable appearing, NAD, non toxic. NCAT PERRLA + horizontal nystagmus EOMI neck supple non tender normal wob cta bl rrr abdomen s nt nd no rebound no guarding WWPx4 neuro non focal.  Labs EKG imaging reviewed.  Patient feeling significantly improved.  Neuro exam nonfocal, patient ambulatory without assistance.  Patient with no signs of trauma or complaints to right face.  Likely related to IV on same site.  Aware of all results, given a copy of all available results, comfortable with discharge and follow-up outpatient, strict return precautions given. Endorses understanding and aware they can return at any time for further evaluation. No further questions or concerns at this time.

## 2022-02-26 NOTE — ED PROVIDER NOTE - PROGRESS NOTE DETAILS
CP: Patient reports feeling much improved. Normal gait. Given strict return precautions and follow up with ENT. Patient verbalized understanding and is agreeable to plan.

## 2022-02-26 NOTE — ED PROVIDER NOTE - PATIENT PORTAL LINK FT
You can access the FollowMyHealth Patient Portal offered by [facility] by registering at the following website: [email or web address]. By joining [employer]’s FollowMyHealth portal, you will also be able to view your health information using other applications (apps) compatible with our system.

## 2022-02-26 NOTE — ED PROVIDER NOTE - NSFOLLOWUPCLINICS_GEN_ALL_ED_FT
[facility]  ENT  [address], [address]nd [place] [address]  Phone: [phone]  Fax:   Follow Up Time: 1-3 Days

## 2022-02-26 NOTE — ED PROVIDER NOTE - NS ED ROS FT
GEN:  no fever, no chills, no generalized weakness  NEURO:  no headache, +dizziness  ENT: no sore throat, no runny nose  CV:  no chest pain, no palpitations  RESP:  no sob, no cough  GI:  +nausea, no vomiting, no abdominal pain, no diarrhea  :  no dysuria, no urinary frequency, no hematuria  MSK:  no joint pain, no edema  SKIN:  no rash, no bruising

## 2022-02-26 NOTE — ED PROVIDER NOTE - NSFOLLOWUPINSTRUCTIONS_ED_ALL_ED_FT
Vertigo      Vertigo is the feeling that you or the things around you are moving when they are not. This feeling can come and go at any time. Vertigo often goes away on its own. This condition can be dangerous if it happens when you are doing activities like driving or working with machines.    Your doctor will do tests to find the cause of your vertigo. These tests will also help your doctor decide on the best treatment for you.      Follow these instructions at home:      Eating and drinking                   •Drink enough fluid to keep your pee (urine) pale yellow.      • Do not drink alcohol.      Activity     •Return to your normal activities as told by your doctor. Ask your doctor what activities are safe for you.      •In the morning, first sit up on the side of the bed. When you feel okay, stand slowly while you hold onto something until you know that your balance is fine.      •Move slowly. Avoid sudden body or head movements or certain positions, as told by your doctor.      •Use a cane if you have trouble standing or walking.      •Sit down right away if you feel dizzy.      •Avoid doing any tasks or activities that can cause danger to you or others if you get dizzy.      •Avoid bending down if you feel dizzy. Place items in your home so that they are easy for you to reach without leaning over.      • Do not drive or use heavy machinery if you feel dizzy.      General instructions     •Take over-the-counter and prescription medicines only as told by your doctor.      •Keep all follow-up visits as told by your doctor. This is important.        Contact a doctor if:    •Your medicine does not help your vertigo.      •You have a fever.      •Your problems get worse or you have new symptoms.      •Your family or friends see changes in your behavior.      •The feeling of being sick to your stomach gets worse.      •Your vomiting gets worse.      •You lose feeling (have numbness) in part of your body.      •You feel prickling and tingling in a part of your body.        Get help right away if:    •You have trouble moving or talking.      •You are always dizzy.      •You pass out (faint).      •You get very bad headaches.      •You feel weak in your hands, arms, or legs.      •You have changes in your hearing.      •You have changes in how you see (vision).      •You get a stiff neck.      •Bright light starts to bother you.        Summary    •Vertigo is the feeling that you or the things around you are moving when they are not.      •Your doctor will do tests to find the cause of your vertigo.      •You may be told to avoid some tasks, positions, or movements.      •Contact a doctor if your medicine is not helping, or if you have a fever, new symptoms, or a change in behavior.      •Get help right away if you get very bad headaches, or if you have changes in how you speak, hear, or see.      This information is not intended to replace advice given to you by your health care provider. Make sure you discuss any questions you have with your health care provider.

## 2022-02-26 NOTE — ED PROVIDER NOTE - OBJECTIVE STATEMENT
75 yo female, PMHx of afib on Xarelto, PPM, HTN, HLD, presents for sudden onset dizziness at 10:30 pm lasting about 30 minutes, aggravated by movement, alleviated on its own, associated with nausea, at rest. Patient states she has had similar episodes in the past but to a lesser degree. Denies fevers, chills, chest pain, shortness of breath, vomiting, abdominal pain, weakness.

## 2022-02-26 NOTE — ED PROVIDER NOTE - PHYSICAL EXAMINATION
CONSTITUTIONAL: Well-developed; well-nourished; in no acute distress.   SKIN: warm, dry  HEAD: Normocephalic;   EYES: no conjunctival injection. PERRLA. EOMI.  ENT: No nasal discharge  NECK: Supple  CARD: S1, S2 normal; Regular rate and rhythm.   RESP: No wheezes, rales or rhonchi.  ABD: soft ntnd  EXT: Normal ROM.  No clubbing, cyanosis or edema.   NEURO: Alert, oriented, grossly unremarkable. +mild nystagmus with rightward gaze. no dizziness incited with head movement. CN 2-12 grossly intact. strength and sensation equal b/l UE and LE  PSYCH: Cooperative, appropriate.

## 2022-03-16 ENCOUNTER — APPOINTMENT (OUTPATIENT)
Dept: OTOLARYNGOLOGY | Facility: CLINIC | Age: 77
End: 2022-03-16
Payer: MEDICARE

## 2022-03-16 VITALS — HEIGHT: 65 IN | BODY MASS INDEX: 22.33 KG/M2 | WEIGHT: 134 LBS

## 2022-03-16 DIAGNOSIS — R42 DIZZINESS AND GIDDINESS: ICD-10-CM

## 2022-03-16 PROCEDURE — 99203 OFFICE O/P NEW LOW 30 MIN: CPT

## 2022-03-16 NOTE — PHYSICAL EXAM
[Normal] : mucosa is normal [Midline] : trachea located in midline position [de-identified] : cerumen removed b/l

## 2022-04-12 ENCOUNTER — NON-APPOINTMENT (OUTPATIENT)
Age: 77
End: 2022-04-12

## 2022-04-12 ENCOUNTER — APPOINTMENT (OUTPATIENT)
Dept: CARDIOLOGY | Facility: CLINIC | Age: 77
End: 2022-04-12
Payer: MEDICARE

## 2022-04-12 PROCEDURE — 93294 REM INTERROG EVL PM/LDLS PM: CPT

## 2022-04-12 PROCEDURE — 93296 REM INTERROG EVL PM/IDS: CPT

## 2022-06-13 ENCOUNTER — NON-APPOINTMENT (OUTPATIENT)
Age: 77
End: 2022-06-13

## 2022-07-15 ENCOUNTER — APPOINTMENT (OUTPATIENT)
Dept: CARDIOLOGY | Facility: CLINIC | Age: 77
End: 2022-07-15

## 2022-08-16 ENCOUNTER — APPOINTMENT (OUTPATIENT)
Dept: CARDIOLOGY | Facility: CLINIC | Age: 77
End: 2022-08-16

## 2022-08-16 VITALS
SYSTOLIC BLOOD PRESSURE: 140 MMHG | BODY MASS INDEX: 22.49 KG/M2 | WEIGHT: 135 LBS | DIASTOLIC BLOOD PRESSURE: 80 MMHG | TEMPERATURE: 98.1 F | HEIGHT: 65 IN

## 2022-08-16 VITALS — HEART RATE: 68 BPM

## 2022-08-16 PROCEDURE — 93000 ELECTROCARDIOGRAM COMPLETE: CPT | Mod: 59

## 2022-08-16 PROCEDURE — 99215 OFFICE O/P EST HI 40 MIN: CPT

## 2022-08-16 PROCEDURE — 93280 PM DEVICE PROGR EVAL DUAL: CPT

## 2022-08-16 RX ORDER — TROSPIUM CHLORIDE 60 MG/1
60 CAPSULE, EXTENDED RELEASE ORAL DAILY
Refills: 0 | Status: COMPLETED | COMMUNITY
End: 2022-08-16

## 2022-08-20 NOTE — HISTORY OF PRESENT ILLNESS
[Palpitations] : no palpitations [SOB] : no dyspnea [Erythema at Site] : no erythema at device site [Swelling at Site] : no swelling at device site [de-identified] : \par \par \par   [FreeTextEntry1] : 76 y/o with pmh of  MVR MAZE, AF/AFL s/p AF ablation x2, recurrent Afib/ Fl , rhythm controlled with  Flecainide, CHB , she underwent a Dual Chamber PPM implantation on 1/29/2018 \par \par \par Nuclear 6/17/17 - normal EF 60%\par JAXSON (5/15/2018)- EF 55-60%, mod AR, mild MR , mild - mod TR\par \par PCP: Dr. Cota ( [phone]\par Cardiologist:  Dr. Christian\par \par 08/16/22: Feels fine. \par Denies chest pain, shortness of breath, palpitation, dizziness or LOC except noted above.\par \par EKG (08/16/22): AP@62\par

## 2022-08-20 NOTE — ASSESSMENT
[FreeTextEntry1] : ## Sinus Node Dysfunction s.p DC-PPM (BSC, Non-dep)\par ## paroxysmal atrial fibrillation \par ## Atrial Flutter \par \par - PPM interrogation shows normally functioning DC-PPM. Battery life ok. No new events. \par - On Xarelto. Compliant. No bleeding issues. Patient to contact us if there is any bleeding issues, interruption or any issues with OAC. Patient to go to ER/call 911 if any major bleeding. \par - On flecainide and Metoprolol. QRSd 112 ms\par - Remote Monitoring\par - Return in 6 months

## 2022-08-20 NOTE — PROCEDURE
[No] : not [NSR] : normal sinus rhythm [Pacemaker] : pacemaker [DDDR] : DDDR [Longevity: ___ months] : The estimated remaining battery life is [unfilled] months [Normal] : The battery status is normal. [Threshold Testing Performed] : Threshold testing was performed [Lead Imp:  ___ohms] : lead impedance was [unfilled] ohms [Sensing Amplitude ___mv] : sensing amplitude was [unfilled] mv [___V @] : [unfilled] V [___ ms] : [unfilled] ms [None] : none [de-identified] : [place] [facility] [de-identified] : Accortizde [de-identified] : [id number] [de-identified] : 1/29/18 [de-identified] : 60 [de-identified] :  54 %\par AP 44%

## 2022-08-25 ENCOUNTER — EMERGENCY (EMERGENCY)
Facility: HOSPITAL | Age: 77
LOS: 0 days | Discharge: NURSING, REHAB OR LONG-TERM CARE | End: 2022-08-26
Admitting: HOSPITALIST

## 2022-08-25 ENCOUNTER — INPATIENT (INPATIENT)
Facility: HOSPITAL | Age: 77
LOS: 1 days | Discharge: HOME | End: 2022-08-27
Attending: HOSPITALIST | Admitting: HOSPITALIST

## 2022-08-25 VITALS
OXYGEN SATURATION: 98 % | RESPIRATION RATE: 18 BRPM | SYSTOLIC BLOOD PRESSURE: 151 MMHG | WEIGHT: 134.04 LBS | TEMPERATURE: 97 F | DIASTOLIC BLOOD PRESSURE: 69 MMHG | HEART RATE: 60 BPM

## 2022-08-25 DIAGNOSIS — I48.20 CHRONIC ATRIAL FIBRILLATION, UNSPECIFIED: ICD-10-CM

## 2022-08-25 DIAGNOSIS — R11.0 NAUSEA: ICD-10-CM

## 2022-08-25 DIAGNOSIS — N32.81 OVERACTIVE BLADDER: ICD-10-CM

## 2022-08-25 DIAGNOSIS — E87.1 HYPO-OSMOLALITY AND HYPONATREMIA: ICD-10-CM

## 2022-08-25 DIAGNOSIS — I10 ESSENTIAL (PRIMARY) HYPERTENSION: ICD-10-CM

## 2022-08-25 DIAGNOSIS — R68.83 CHILLS (WITHOUT FEVER): ICD-10-CM

## 2022-08-25 DIAGNOSIS — Z95.2 PRESENCE OF PROSTHETIC HEART VALVE: ICD-10-CM

## 2022-08-25 DIAGNOSIS — E78.5 HYPERLIPIDEMIA, UNSPECIFIED: ICD-10-CM

## 2022-08-25 DIAGNOSIS — E55.9 VITAMIN D DEFICIENCY, UNSPECIFIED: ICD-10-CM

## 2022-08-25 DIAGNOSIS — Z95.0 PRESENCE OF CARDIAC PACEMAKER: ICD-10-CM

## 2022-08-25 DIAGNOSIS — Z20.822 CONTACT WITH AND (SUSPECTED) EXPOSURE TO COVID-19: ICD-10-CM

## 2022-08-25 DIAGNOSIS — R74.01 ELEVATION OF LEVELS OF LIVER TRANSAMINASE LEVELS: ICD-10-CM

## 2022-08-25 DIAGNOSIS — R53.83 OTHER FATIGUE: ICD-10-CM

## 2022-08-25 DIAGNOSIS — Z87.448 PERSONAL HISTORY OF OTHER DISEASES OF URINARY SYSTEM: ICD-10-CM

## 2022-08-25 DIAGNOSIS — R63.0 ANOREXIA: ICD-10-CM

## 2022-08-25 DIAGNOSIS — Z79.01 LONG TERM (CURRENT) USE OF ANTICOAGULANTS: ICD-10-CM

## 2022-08-25 DIAGNOSIS — I48.91 UNSPECIFIED ATRIAL FIBRILLATION: ICD-10-CM

## 2022-08-25 LAB
ALBUMIN SERPL ELPH-MCNC: 4.1 G/DL — SIGNIFICANT CHANGE UP (ref 3.5–5.2)
ALP SERPL-CCNC: 94 U/L — SIGNIFICANT CHANGE UP (ref 30–115)
ALT FLD-CCNC: 72 U/L — HIGH (ref 0–41)
ANION GAP SERPL CALC-SCNC: 10 MMOL/L — SIGNIFICANT CHANGE UP (ref 7–14)
APPEARANCE UR: CLEAR — SIGNIFICANT CHANGE UP
APTT BLD: 37.8 SEC — SIGNIFICANT CHANGE UP (ref 27–39.2)
AST SERPL-CCNC: 128 U/L — HIGH (ref 0–41)
BASOPHILS # BLD AUTO: 0.08 K/UL — SIGNIFICANT CHANGE UP (ref 0–0.2)
BASOPHILS NFR BLD AUTO: 0.8 % — SIGNIFICANT CHANGE UP (ref 0–1)
BILIRUB SERPL-MCNC: 1 MG/DL — SIGNIFICANT CHANGE UP (ref 0.2–1.2)
BILIRUB UR-MCNC: NEGATIVE — SIGNIFICANT CHANGE UP
BUN SERPL-MCNC: 18 MG/DL — SIGNIFICANT CHANGE UP (ref 10–20)
CALCIUM SERPL-MCNC: 8.6 MG/DL — SIGNIFICANT CHANGE UP (ref 8.5–10.1)
CHLORIDE SERPL-SCNC: 90 MMOL/L — LOW (ref 98–110)
CO2 SERPL-SCNC: 22 MMOL/L — SIGNIFICANT CHANGE UP (ref 17–32)
COLOR SPEC: SIGNIFICANT CHANGE UP
CREAT SERPL-MCNC: 1.2 MG/DL — SIGNIFICANT CHANGE UP (ref 0.7–1.5)
DIFF PNL FLD: NEGATIVE — SIGNIFICANT CHANGE UP
EGFR: 47 ML/MIN/1.73M2 — LOW
EOSINOPHIL # BLD AUTO: 0.08 K/UL — SIGNIFICANT CHANGE UP (ref 0–0.7)
EOSINOPHIL NFR BLD AUTO: 0.8 % — SIGNIFICANT CHANGE UP (ref 0–8)
GLUCOSE SERPL-MCNC: 112 MG/DL — HIGH (ref 70–99)
GLUCOSE UR QL: NEGATIVE — SIGNIFICANT CHANGE UP
HCT VFR BLD CALC: 34.2 % — LOW (ref 37–47)
HGB BLD-MCNC: 11.8 G/DL — LOW (ref 12–16)
IMM GRANULOCYTES NFR BLD AUTO: 0.5 % — HIGH (ref 0.1–0.3)
INR BLD: 2.13 RATIO — HIGH (ref 0.65–1.3)
KETONES UR-MCNC: NEGATIVE — SIGNIFICANT CHANGE UP
LEUKOCYTE ESTERASE UR-ACNC: ABNORMAL
LYMPHOCYTES # BLD AUTO: 2.1 K/UL — SIGNIFICANT CHANGE UP (ref 1.2–3.4)
LYMPHOCYTES # BLD AUTO: 21.6 % — SIGNIFICANT CHANGE UP (ref 20.5–51.1)
MAGNESIUM SERPL-MCNC: 1.8 MG/DL — SIGNIFICANT CHANGE UP (ref 1.8–2.4)
MCHC RBC-ENTMCNC: 31.2 PG — HIGH (ref 27–31)
MCHC RBC-ENTMCNC: 34.5 G/DL — SIGNIFICANT CHANGE UP (ref 32–37)
MCV RBC AUTO: 90.5 FL — SIGNIFICANT CHANGE UP (ref 81–99)
MONOCYTES # BLD AUTO: 0.64 K/UL — HIGH (ref 0.1–0.6)
MONOCYTES NFR BLD AUTO: 6.6 % — SIGNIFICANT CHANGE UP (ref 1.7–9.3)
NEUTROPHILS # BLD AUTO: 6.75 K/UL — HIGH (ref 1.4–6.5)
NEUTROPHILS NFR BLD AUTO: 69.7 % — SIGNIFICANT CHANGE UP (ref 42.2–75.2)
NITRITE UR-MCNC: NEGATIVE — SIGNIFICANT CHANGE UP
NRBC # BLD: 0 /100 WBCS — SIGNIFICANT CHANGE UP (ref 0–0)
PH UR: 6 — SIGNIFICANT CHANGE UP (ref 5–8)
PLATELET # BLD AUTO: 201 K/UL — SIGNIFICANT CHANGE UP (ref 130–400)
POTASSIUM SERPL-MCNC: 4.6 MMOL/L — SIGNIFICANT CHANGE UP (ref 3.5–5)
POTASSIUM SERPL-SCNC: 4.6 MMOL/L — SIGNIFICANT CHANGE UP (ref 3.5–5)
PROT SERPL-MCNC: 6.3 G/DL — SIGNIFICANT CHANGE UP (ref 6–8)
PROT UR-MCNC: SIGNIFICANT CHANGE UP
PROTHROM AB SERPL-ACNC: 24.3 SEC — HIGH (ref 9.95–12.87)
RBC # BLD: 3.78 M/UL — LOW (ref 4.2–5.4)
RBC # FLD: 15.3 % — HIGH (ref 11.5–14.5)
SODIUM SERPL-SCNC: 122 MMOL/L — LOW (ref 135–146)
SP GR SPEC: 1.01 — SIGNIFICANT CHANGE UP (ref 1.01–1.03)
UROBILINOGEN FLD QL: SIGNIFICANT CHANGE UP
WBC # BLD: 9.7 K/UL — SIGNIFICANT CHANGE UP (ref 4.8–10.8)
WBC # FLD AUTO: 9.7 K/UL — SIGNIFICANT CHANGE UP (ref 4.8–10.8)

## 2022-08-25 PROCEDURE — 71045 X-RAY EXAM CHEST 1 VIEW: CPT | Mod: 26

## 2022-08-25 PROCEDURE — 99285 EMERGENCY DEPT VISIT HI MDM: CPT | Mod: FS

## 2022-08-25 RX ORDER — ONDANSETRON 8 MG/1
4 TABLET, FILM COATED ORAL ONCE
Refills: 0 | Status: COMPLETED | OUTPATIENT
Start: 2022-08-25 | End: 2022-08-25

## 2022-08-25 RX ORDER — SODIUM CHLORIDE 9 MG/ML
1000 INJECTION INTRAMUSCULAR; INTRAVENOUS; SUBCUTANEOUS ONCE
Refills: 0 | Status: COMPLETED | OUTPATIENT
Start: 2022-08-25 | End: 2022-08-25

## 2022-08-25 RX ADMIN — ONDANSETRON 4 MILLIGRAM(S): 8 TABLET, FILM COATED ORAL at 22:53

## 2022-08-25 RX ADMIN — SODIUM CHLORIDE 1000 MILLILITER(S): 9 INJECTION INTRAMUSCULAR; INTRAVENOUS; SUBCUTANEOUS at 22:52

## 2022-08-25 NOTE — ED PROVIDER NOTE - NS ED ROS FT
Constitutional: No fever, chills. (+)malaise, generalized weakness.   Eyes:  No visual changes  ENMT:  No neck pain  Cardiac:  No chest pain  Respiratory:  No cough, SOB  GI:  (+) nausea. no vomiting, diarrhea, abdominal pain.  :  No dysuria, hematuria  MS:  No back pain.  Neuro:  No headache or lightheadedness  Skin:  No skin rash  Endocrine: No history of thyroid disease or diabetes.  Except as documented in the HPI,  all other systems are negative.

## 2022-08-25 NOTE — ED PROVIDER NOTE - NS ED ATTENDING STATEMENT MOD
This was a shared visit with the [facility]. I reviewed and verified the documentation and independently performed the documented:

## 2022-08-25 NOTE — ED PROVIDER NOTE - OBJECTIVE STATEMENT
77 yo F pmhx afib on xarelto, htn, hld presenting to the ED for evaluation of fatigue, generalized weakness, nausea and decreased appetite today. Pt reports she thinks she may be dehydrated. states she has been drinking a lot of water and Gatorade today. Pt denies any sick contacts or recent travel. denies fever, chills, vomiting, abd pain, chest pain, sob, dysuria, hematuria.

## 2022-08-25 NOTE — ED PROVIDER NOTE - CLINICAL SUMMARY MEDICAL DECISION MAKING FREE TEXT BOX
Pt came with c/o of abdominal pain and nausea onset 5 days ago. Pt denies any fever or chills but requesting to be swabbed for Covid because his work is requesting it. Vs are stable. R Ac 20 gi is placed, blood collected and sent to lab. Urine is collected and sent as well  
76-year-old female with history as documented presenting with not feeling well.  Labs significant for hyponatremia, likely secondary to dilution secondary to over consumption of water.  EKG no STEMI.  Imaging reviewed.  Patient alert and oriented x4, at this time ICU monitoring.  Will admit for further management.

## 2022-08-25 NOTE — ED PROVIDER NOTE - ATTENDING APP SHARED VISIT CONTRIBUTION OF CARE
76-year-old female history of A. fib on Xarelto as well as flecainide, pacemaker, hypertension, hyperlipidemia, overactive bladder presenting with "not feeling well".  Patient states that for the last 2 days she just did not feel right, endorses mild nausea, believed she was dehydrated therefore has been drinking excessive amounts of water over the last night.  Denies any chest pain or shortness of breath.  Denies any dysuria.  Denies any overt fevers.  Does endorse chills.  Denies cough.  Patient denies any change in vision, numbness tingling or focal weakness of the extremities.    Patient is well-appearing on exam no acute distress.  Vitals reviewed.  Lungs clear to auscultation bilaterally.  Regular rhythm.  Unremarkable neurological exam.  Abdomen soft nontender nondistended.  No significant lower extremity edema.    EKG without STEMI  Plan for labs, imaging, UA, reassess.

## 2022-08-25 NOTE — ED ADULT NURSE NOTE - NSIMPLEMENTINTERV_GEN_ALL_ED
Implemented All Fall Risk Interventions:  [place] to call system. Call bell, personal items and telephone within reach. Instruct patient to call for assistance. Room bathroom lighting operational. Non-slip footwear when patient is off stretcher. Physically safe environment: no spills, clutter or unnecessary equipment. Stretcher in lowest position, wheels locked, appropriate side rails in place. Provide visual cue, wrist band, yellow gown, etc. Monitor gait and stability. Monitor for mental status changes and reorient to person, place, and time. Review medications for side effects contributing to fall risk. Reinforce activity limits and safety measures with patient and family.

## 2022-08-26 DIAGNOSIS — Z98.890 OTHER SPECIFIED POSTPROCEDURAL STATES: Chronic | ICD-10-CM

## 2022-08-26 DIAGNOSIS — Z95.0 PRESENCE OF CARDIAC PACEMAKER: Chronic | ICD-10-CM

## 2022-08-26 LAB
ALBUMIN SERPL ELPH-MCNC: 4 G/DL — SIGNIFICANT CHANGE UP (ref 3.5–5.2)
ALP SERPL-CCNC: 86 U/L — SIGNIFICANT CHANGE UP (ref 30–115)
ALT FLD-CCNC: 64 U/L — HIGH (ref 0–41)
ANION GAP SERPL CALC-SCNC: 10 MMOL/L — SIGNIFICANT CHANGE UP (ref 7–14)
APPEARANCE UR: CLEAR — SIGNIFICANT CHANGE UP
AST SERPL-CCNC: 64 U/L — HIGH (ref 0–41)
BACTERIA # UR AUTO: ABNORMAL
BILIRUB SERPL-MCNC: 0.9 MG/DL — SIGNIFICANT CHANGE UP (ref 0.2–1.2)
BILIRUB UR-MCNC: NEGATIVE — SIGNIFICANT CHANGE UP
BUN SERPL-MCNC: 17 MG/DL — SIGNIFICANT CHANGE UP (ref 10–20)
CALCIUM SERPL-MCNC: 8.7 MG/DL — SIGNIFICANT CHANGE UP (ref 8.5–10.1)
CHLORIDE SERPL-SCNC: 96 MMOL/L — LOW (ref 98–110)
CO2 SERPL-SCNC: 23 MMOL/L — SIGNIFICANT CHANGE UP (ref 17–32)
COLOR SPEC: SIGNIFICANT CHANGE UP
CREAT SERPL-MCNC: 1 MG/DL — SIGNIFICANT CHANGE UP (ref 0.7–1.5)
DIFF PNL FLD: NEGATIVE — SIGNIFICANT CHANGE UP
EGFR: 58 ML/MIN/1.73M2 — LOW
EPI CELLS # UR: 10 /HPF — HIGH (ref 0–5)
GLUCOSE SERPL-MCNC: 85 MG/DL — SIGNIFICANT CHANGE UP (ref 70–99)
GLUCOSE UR QL: NEGATIVE — SIGNIFICANT CHANGE UP
HCT VFR BLD CALC: 31.5 % — LOW (ref 37–47)
HGB BLD-MCNC: 11.3 G/DL — LOW (ref 12–16)
HYALINE CASTS # UR AUTO: 1 /LPF — SIGNIFICANT CHANGE UP (ref 0–7)
KETONES UR-MCNC: SIGNIFICANT CHANGE UP
LEUKOCYTE ESTERASE UR-ACNC: NEGATIVE — SIGNIFICANT CHANGE UP
MAGNESIUM SERPL-MCNC: 1.9 MG/DL — SIGNIFICANT CHANGE UP (ref 1.8–2.4)
MCHC RBC-ENTMCNC: 32.2 PG — HIGH (ref 27–31)
MCHC RBC-ENTMCNC: 35.9 G/DL — SIGNIFICANT CHANGE UP (ref 32–37)
MCV RBC AUTO: 89.7 FL — SIGNIFICANT CHANGE UP (ref 81–99)
NITRITE UR-MCNC: NEGATIVE — SIGNIFICANT CHANGE UP
NRBC # BLD: 0 /100 WBCS — SIGNIFICANT CHANGE UP (ref 0–0)
OSMOLALITY SERPL: 272 MOS/KG — LOW (ref 280–301)
OSMOLALITY UR: 138 MOS/KG — SIGNIFICANT CHANGE UP (ref 50–1200)
PH UR: 6.5 — SIGNIFICANT CHANGE UP (ref 5–8)
PHOSPHATE 24H UR-MCNC: 20 MG/DL — SIGNIFICANT CHANGE UP
PLATELET # BLD AUTO: 179 K/UL — SIGNIFICANT CHANGE UP (ref 130–400)
POTASSIUM SERPL-MCNC: 4.1 MMOL/L — SIGNIFICANT CHANGE UP (ref 3.5–5)
POTASSIUM SERPL-SCNC: 4.1 MMOL/L — SIGNIFICANT CHANGE UP (ref 3.5–5)
POTASSIUM UR-SCNC: 9 MMOL/L — SIGNIFICANT CHANGE UP
PROT SERPL-MCNC: 6.1 G/DL — SIGNIFICANT CHANGE UP (ref 6–8)
PROT UR-MCNC: NEGATIVE — SIGNIFICANT CHANGE UP
RBC # BLD: 3.51 M/UL — LOW (ref 4.2–5.4)
RBC # FLD: 15.3 % — HIGH (ref 11.5–14.5)
RBC CASTS # UR COMP ASSIST: 1 /HPF — SIGNIFICANT CHANGE UP (ref 0–4)
SARS-COV-2 RNA SPEC QL NAA+PROBE: SIGNIFICANT CHANGE UP
SODIUM SERPL-SCNC: 129 MMOL/L — LOW (ref 135–146)
SODIUM UR-SCNC: <20 MMOL/L — SIGNIFICANT CHANGE UP
SP GR SPEC: 1.01 — SIGNIFICANT CHANGE UP (ref 1.01–1.03)
TSH SERPL-MCNC: 3.48 UIU/ML — SIGNIFICANT CHANGE UP (ref 0.27–4.2)
UROBILINOGEN FLD QL: SIGNIFICANT CHANGE UP
WBC # BLD: 7.2 K/UL — SIGNIFICANT CHANGE UP (ref 4.8–10.8)
WBC # FLD AUTO: 7.2 K/UL — SIGNIFICANT CHANGE UP (ref 4.8–10.8)
WBC UR QL: 18 /HPF — HIGH (ref 0–5)

## 2022-08-26 PROCEDURE — 99497 ADVNCD CARE PLAN 30 MIN: CPT | Mod: 25

## 2022-08-26 PROCEDURE — 93010 ELECTROCARDIOGRAM REPORT: CPT

## 2022-08-26 PROCEDURE — 76705 ECHO EXAM OF ABDOMEN: CPT | Mod: 26

## 2022-08-26 PROCEDURE — 99223 1ST HOSP IP/OBS HIGH 75: CPT

## 2022-08-26 RX ORDER — SODIUM CHLORIDE 9 MG/ML
1000 INJECTION INTRAMUSCULAR; INTRAVENOUS; SUBCUTANEOUS
Refills: 0 | Status: DISCONTINUED | OUTPATIENT
Start: 2022-08-26 | End: 2022-08-26

## 2022-08-26 RX ORDER — ONDANSETRON 8 MG/1
4 TABLET, FILM COATED ORAL ONCE
Refills: 0 | Status: COMPLETED | OUTPATIENT
Start: 2022-08-26 | End: 2022-08-26

## 2022-08-26 RX ORDER — METOPROLOL TARTRATE 50 MG
50 TABLET ORAL THREE TIMES A DAY
Refills: 0 | Status: DISCONTINUED | OUTPATIENT
Start: 2022-08-26 | End: 2022-08-27

## 2022-08-26 RX ORDER — FLECAINIDE ACETATE 50 MG
50 TABLET ORAL EVERY 12 HOURS
Refills: 0 | Status: DISCONTINUED | OUTPATIENT
Start: 2022-08-26 | End: 2022-08-27

## 2022-08-26 RX ORDER — CHOLECALCIFEROL (VITAMIN D3) 125 MCG
1000 CAPSULE ORAL DAILY
Refills: 0 | Status: DISCONTINUED | OUTPATIENT
Start: 2022-08-26 | End: 2022-08-27

## 2022-08-26 RX ORDER — FAMOTIDINE 10 MG/ML
20 INJECTION INTRAVENOUS DAILY
Refills: 0 | Status: DISCONTINUED | OUTPATIENT
Start: 2022-08-26 | End: 2022-08-27

## 2022-08-26 RX ORDER — ATORVASTATIN CALCIUM 80 MG/1
20 TABLET, FILM COATED ORAL AT BEDTIME
Refills: 0 | Status: DISCONTINUED | OUTPATIENT
Start: 2022-08-26 | End: 2022-08-27

## 2022-08-26 RX ORDER — RIVAROXABAN 15 MG-20MG
20 KIT ORAL
Refills: 0 | Status: DISCONTINUED | OUTPATIENT
Start: 2022-08-26 | End: 2022-08-27

## 2022-08-26 RX ORDER — OXYBUTYNIN CHLORIDE 5 MG
5 TABLET ORAL
Refills: 0 | Status: DISCONTINUED | OUTPATIENT
Start: 2022-08-26 | End: 2022-08-27

## 2022-08-26 RX ADMIN — Medication 1000 UNIT(S): at 11:14

## 2022-08-26 RX ADMIN — Medication 50 MILLIGRAM(S): at 11:15

## 2022-08-26 RX ADMIN — ONDANSETRON 4 MILLIGRAM(S): 8 TABLET, FILM COATED ORAL at 01:23

## 2022-08-26 RX ADMIN — SODIUM CHLORIDE 60 MILLILITER(S): 9 INJECTION INTRAMUSCULAR; INTRAVENOUS; SUBCUTANEOUS at 12:52

## 2022-08-26 RX ADMIN — Medication 5 MILLIGRAM(S): at 17:02

## 2022-08-26 RX ADMIN — Medication 50 MILLIGRAM(S): at 21:13

## 2022-08-26 RX ADMIN — Medication 50 MILLIGRAM(S): at 17:02

## 2022-08-26 RX ADMIN — FAMOTIDINE 20 MILLIGRAM(S): 10 INJECTION INTRAVENOUS at 11:14

## 2022-08-26 RX ADMIN — ATORVASTATIN CALCIUM 20 MILLIGRAM(S): 80 TABLET, FILM COATED ORAL at 21:13

## 2022-08-26 RX ADMIN — RIVAROXABAN 20 MILLIGRAM(S): KIT at 17:02

## 2022-08-26 NOTE — H&P ADULT - HISTORY OF PRESENT ILLNESS
Patient is 76-year-old female with PMH of Afib on Xarelto, s/p pacemaker, mitral valve repair, hypertension, hyperlipidemia, overactive bladder presenting presented complaining of nausea and thirst for 2 days associated with excessive water consumption. Patient reports 2 days ago she started to experience severe thirst, thought she was dehydrated, which prompted her to drink more water. Yesterday, she started to feel not herself, "like something was not right", after which she presented to the hospital. Patient reports she consumed about 4 bottles of water yesterday. Patient also endorses decreased appetite for couple of days and nausea, but denies vomiting. Patient denies headache, dizziness, change in vision, fevers, chest pain, palpitations, diarrhea, constipation, dysuria, polyuria, numbness.    Vital Signs Last 24 Hrs  T(C): 36.4 (26 Aug 2022 04:56), Max: 36.4 (25 Aug 2022 23:55)  T(F): 97.6 (26 Aug 2022 04:56), Max: 97.6 (26 Aug 2022 04:56)  HR: 60 (26 Aug 2022 08:00) (60 - 61)  BP: 175/81 (26 Aug 2022 08:00) (145/65 - 175/81)  RR: 18 (26 Aug 2022 08:00) (18 - 18)  SpO2: 97% (25 Aug 2022 23:55) (97% - 98%)    Pt received 1 L NS bolus in ED, was admitted for hyponatremia management.     Patient is 76-year-old female with PMH of Afib on Xarelto, s/p pacemaker, mitral valve repair, hypertension, hyperlipidemia, overactive bladder presenting presented complaining of nausea and thirst for 2 days associated with excessive water consumption. Patient reports 2 days ago she started to experience severe thirst, thought she was dehydrated, which prompted her to drink more water. Yesterday, she started to feel not herself, "like something was not right", after which she presented to the hospital. Patient reports she consumed about 4 bottles of water yesterday. Patient also endorses decreased appetite for couple of days and nausea, but denies vomiting. Patient denies headache, dizziness, change in vision, fevers, chest pain, palpitations, diarrhea, constipation, dysuria, polyuria, numbness.    Vital Signs Last 24 Hrs  T(C): 36.4 (26 Aug 2022 04:56), Max: 36.4 (25 Aug 2022 23:55)  T(F): 97.6 (26 Aug 2022 04:56), Max: 97.6 (26 Aug 2022 04:56)  HR: 60 (26 Aug 2022 08:00) (60 - 61)  BP: 175/81 (26 Aug 2022 08:00) (145/65 - 175/81)  RR: 18 (26 Aug 2022 08:00) (18 - 18)  SpO2: 97% (25 Aug 2022 23:55) (97% - 98%)    Labs were significant for Na 122, Cl 90, /ALT 72.    CXR demonstrated right midlung field linear atelectasis. Otherwise stable findings.   US Abdomen Upper Quadrant Right (08.26.22): Diffuse nonspecific gallbladder wall thickening with apparent pericholecystic fluid. This is nonspecific and if clinically warranted, nuclear medicine HIDA scan may be of benefit. Unremarkable sonographic appearance of the liver    Pt received 1 L NS bolus in ED, was admitted for hyponatremia management.

## 2022-08-26 NOTE — PATIENT PROFILE ADULT - FALL HARM RISK - RISK INTERVENTIONS

## 2022-08-26 NOTE — PATIENT PROFILE ADULT - FOOD INSECURITY
Occupational Therapy         Patient Name: Margarette Juan  PXCHG'K Date: 8/15/2019      Order received and chart review performed; pt is discharged from this facility at this time; will complete OT orders  no

## 2022-08-26 NOTE — GOALS OF CARE CONVERSATION - ADVANCED CARE PLANNING - CONVERSATION DETAILS
reviewed advance care planning , what intubation and resuscitation entail , and what are patient wishes if patient requires oxygen that cannot be administered externally (ie intubation) , as well as what to do if the patient's heart stops whether to resuscitate     FULL CODE

## 2022-08-26 NOTE — H&P ADULT - NSHPPHYSICALEXAM_GEN_ALL_CORE
PHYSICAL EXAM:  GENERAL: NAD, AAO x 4, 76y F, alert, cooperative, pleasant  HEAD:  Atraumatic, Normocephalic  EYES: EOMI, conjunctiva and sclera white  NECK: Supple, No JVD  CHEST/LUNG: Clear to auscultation bilaterally; No wheeze; No crackles; No accessory muscles used  HEART: Regular rate and rhythm; No murmurs;   ABDOMEN: Soft, Nontender, Nondistended; Bowel sounds present; No guarding, No organomegaly  EXTREMITIES:  2+ Peripheral Pulses, No cyanosis, trace edema. + varicose vein on b/l LE  NEUROLOGY: non-focal

## 2022-08-26 NOTE — H&P ADULT - ATTENDING COMMENTS
patient seen and examined , agree with pgy 3 assesment and plan except as indicated above,     GEN Lying in no acute distress  HEENT Pupils equal and reactive to light and accommodationSupple Neck  PULM Clear to auscultation bilaterally  CV s1s2 regular rate and rhythm  GI + bowel sounds nontnender  EXT no cyanosis or edema  PSYCH awake alert and oriented x 3  INTEG No Lesions  NEURO Moves all extremities    #Acute hyponatremia secondary to polydipsia   fluid restriction  monitor overnight     #Gallbladder wall thickening assymptomatic no intervnetion for now     PROGRESS NOTE HANDOFF    Pending:  monitor sodium overnight , nephro eval    Family discussion: patient verbalized understanding and agreeable to plan of care      Disposition: Home

## 2022-08-26 NOTE — H&P ADULT - NSHPLABSRESULTS_GEN_ALL_CORE
LABS:                        11.8   9.70  )-----------( 201      ( 25 Aug 2022 22:34 )             34.2           122<L>  |  90<L>  |  18  ----------------------------<  112<H>  4.6   |  22  |  1.2    Ca    8.6      25 Aug 2022 22:34  Mg     1.8         TPro  6.3  /  Alb  4.1  /  TBili  1.0  /  DBili  x   /  AST  128<H>  /  ALT  72<H>  /  AlkPhos  94                Urinalysis Basic - ( 26 Aug 2022 02:58 )    Color: Light Yellow / Appearance: Clear / S.010 / pH: x  Gluc: x / Ketone: Trace  / Bili: Negative / Urobili: <2 mg/dL   Blood: x / Protein: Negative / Nitrite: Negative   Leuk Esterase: Negative / RBC: x / WBC x   Sq Epi: x / Non Sq Epi: x / Bacteria: x        PT/INR - ( 25 Aug 2022 22:34 )   PT: 24.30 sec;   INR: 2.13 ratio         PTT - ( 25 Aug 2022 22:34 )  PTT:37.8 sec    Lactate Trend    RADIOLOGY:  < from: US Abdomen Upper Quadrant Right (22 @ 02:39) >      IMPRESSION:      Diffuse nonspecific gallbladder wall thickening with apparent   pericholecystic fluid. This is nonspecific and if clinically warranted,   nuclear medicine HIDA scan may be of benefit.    Unremarkable sonographic appearance of the liver    < end of copied text >    < from: Xray Chest 1 View- PORTABLE-Urgent (Xray Chest 1 View- PORTABLE-Urgent .) (22 @ 23:59) >    Impression:    Right midlung field linear atelectasis. Otherwise stable findings.    < end of copied text >

## 2022-08-26 NOTE — H&P ADULT - ASSESSMENT
Patient is 76-year-old female with PMH of Afib on Xarelto, s/p pacemaker, mitral valve repair, hypertension, hyperlipidemia, overactive bladder presenting presented complaining of nausea and thirst for 2 days associated with excessive water consumption. Patient reports 2 days ago she started to experience severe thirst, thought she was dehydrated, which prompted her to drink more water. Yesterday, she started to feel not herself, "like something was not right", after which she presented to the hospital.     # hyponatremia secondary to polydysplasia  - Na 122 on admission  - s/p 1 L NS in ED  - c/w NS at 60cc/hr for 10 hours  - f/u serum and urine osm, urine Na  - f/u TSH  - f/u CMP at 11:00    # Afib on Xarelto,   #Hs  of pacemaker,   # Hx mitral valve repair,  - c/w Xarelto, flecainide  - Cardiologist is Dr. Ng    #hypertension,   - c/w metoprolol tartrate 50 mg TID    #hyperlipidemia,  - on rosuvastatin at home, c/w atorvastatin while in patient    #overactive bladder  - c/w oxybutynin    Diet: regular  Activity: as toleratad  DVT Prophylaxis: on Xarelto  GI Prophylaxis: famotidine  CHG Order  Code Status: FULL code  Disposition: medicine admit   Patient is 76-year-old female with PMH of Afib on Xarelto, s/p pacemaker, mitral valve repair, hypertension, hyperlipidemia, overactive bladder presenting presented complaining of nausea and thirst for 2 days associated with excessive water consumption. Patient reports 2 days ago she started to experience severe thirst, thought she was dehydrated, which prompted her to drink more water. Yesterday, she started to feel not herself, "like something was not right", after which she presented to the hospital.     # hyponatremia secondary to polydysplasia  - Na 122 on admission  - s/p 1 L NS in ED  - c/w NS at 60cc/hr for 10 hours  - f/u serum and urine osm, urine Na  - f/u TSH  - f/u CMP at 11:00    # transminitis  - /ALT 72 (AST /ALT normal in 2/2022)  - US Abdomen Upper Quadrant Right (08.26.22) ;Diffuse nonspecific gallbladder wall thickening with apparent pericholecystic fluid. This is nonspecific and if clinically warranted, nuclear medicine HIDA scan may be of benefit. Unremarkable sonographic appearance of the liver  - denies alcohol use  - monitor LFT    # Afib on Xarelto,   #Hs  of pacemaker,   # Hx mitral valve repair,  - c/w Xarelto, flecainide  - Cardiologist is Dr. Ng    #hypertension,   - c/w metoprolol tartrate 50 mg TID    #hyperlipidemia,  - on rosuvastatin at home, c/w atorvastatin while in patient    #overactive bladder  - c/w oxybutynin    Diet: regular  Activity: as toleratad  DVT Prophylaxis: on Xarelto  GI Prophylaxis: famotidine  CHG Order  Code Status: FULL code  Disposition: medicine admit

## 2022-08-27 ENCOUNTER — TRANSCRIPTION ENCOUNTER (OUTPATIENT)
Age: 77
End: 2022-08-27

## 2022-08-27 VITALS
TEMPERATURE: 97 F | SYSTOLIC BLOOD PRESSURE: 110 MMHG | HEART RATE: 60 BPM | RESPIRATION RATE: 18 BRPM | DIASTOLIC BLOOD PRESSURE: 66 MMHG

## 2022-08-27 LAB
A1C WITH ESTIMATED AVERAGE GLUCOSE RESULT: 5.6 % — SIGNIFICANT CHANGE UP (ref 4–5.6)
ALBUMIN SERPL ELPH-MCNC: 3.8 G/DL — SIGNIFICANT CHANGE UP (ref 3.5–5.2)
ALP SERPL-CCNC: 78 U/L — SIGNIFICANT CHANGE UP (ref 30–115)
ALT FLD-CCNC: 51 U/L — HIGH (ref 0–41)
ANION GAP SERPL CALC-SCNC: 10 MMOL/L — SIGNIFICANT CHANGE UP (ref 7–14)
AST SERPL-CCNC: 42 U/L — HIGH (ref 0–41)
BASOPHILS # BLD AUTO: 0.06 K/UL — SIGNIFICANT CHANGE UP (ref 0–0.2)
BASOPHILS NFR BLD AUTO: 0.9 % — SIGNIFICANT CHANGE UP (ref 0–1)
BILIRUB SERPL-MCNC: 0.7 MG/DL — SIGNIFICANT CHANGE UP (ref 0.2–1.2)
BUN SERPL-MCNC: 20 MG/DL — SIGNIFICANT CHANGE UP (ref 10–20)
CALCIUM SERPL-MCNC: 9 MG/DL — SIGNIFICANT CHANGE UP (ref 8.5–10.1)
CHLORIDE SERPL-SCNC: 106 MMOL/L — SIGNIFICANT CHANGE UP (ref 98–110)
CHOLEST SERPL-MCNC: 121 MG/DL — SIGNIFICANT CHANGE UP
CO2 SERPL-SCNC: 24 MMOL/L — SIGNIFICANT CHANGE UP (ref 17–32)
CREAT SERPL-MCNC: 1 MG/DL — SIGNIFICANT CHANGE UP (ref 0.7–1.5)
EGFR: 58 ML/MIN/1.73M2 — LOW
EOSINOPHIL # BLD AUTO: 0.08 K/UL — SIGNIFICANT CHANGE UP (ref 0–0.7)
EOSINOPHIL NFR BLD AUTO: 1.2 % — SIGNIFICANT CHANGE UP (ref 0–8)
ESTIMATED AVERAGE GLUCOSE: 114 MG/DL — SIGNIFICANT CHANGE UP (ref 68–114)
GLUCOSE SERPL-MCNC: 76 MG/DL — SIGNIFICANT CHANGE UP (ref 70–99)
HCT VFR BLD CALC: 32.4 % — LOW (ref 37–47)
HDLC SERPL-MCNC: 51 MG/DL — SIGNIFICANT CHANGE UP
HGB BLD-MCNC: 11.2 G/DL — LOW (ref 12–16)
IMM GRANULOCYTES NFR BLD AUTO: 0.3 % — SIGNIFICANT CHANGE UP (ref 0.1–0.3)
LIPID PNL WITH DIRECT LDL SERPL: 45 MG/DL — SIGNIFICANT CHANGE UP
LYMPHOCYTES # BLD AUTO: 2.47 K/UL — SIGNIFICANT CHANGE UP (ref 1.2–3.4)
LYMPHOCYTES # BLD AUTO: 38.4 % — SIGNIFICANT CHANGE UP (ref 20.5–51.1)
MAGNESIUM SERPL-MCNC: 2.1 MG/DL — SIGNIFICANT CHANGE UP (ref 1.8–2.4)
MCHC RBC-ENTMCNC: 31.9 PG — HIGH (ref 27–31)
MCHC RBC-ENTMCNC: 34.6 G/DL — SIGNIFICANT CHANGE UP (ref 32–37)
MCV RBC AUTO: 92.3 FL — SIGNIFICANT CHANGE UP (ref 81–99)
MONOCYTES # BLD AUTO: 0.53 K/UL — SIGNIFICANT CHANGE UP (ref 0.1–0.6)
MONOCYTES NFR BLD AUTO: 8.2 % — SIGNIFICANT CHANGE UP (ref 1.7–9.3)
NEUTROPHILS # BLD AUTO: 3.28 K/UL — SIGNIFICANT CHANGE UP (ref 1.4–6.5)
NEUTROPHILS NFR BLD AUTO: 51 % — SIGNIFICANT CHANGE UP (ref 42.2–75.2)
NON HDL CHOLESTEROL: 70 MG/DL — SIGNIFICANT CHANGE UP
NRBC # BLD: 0 /100 WBCS — SIGNIFICANT CHANGE UP (ref 0–0)
PLATELET # BLD AUTO: 153 K/UL — SIGNIFICANT CHANGE UP (ref 130–400)
POTASSIUM SERPL-MCNC: 4.8 MMOL/L — SIGNIFICANT CHANGE UP (ref 3.5–5)
POTASSIUM SERPL-SCNC: 4.8 MMOL/L — SIGNIFICANT CHANGE UP (ref 3.5–5)
PROT SERPL-MCNC: 5.9 G/DL — LOW (ref 6–8)
RBC # BLD: 3.51 M/UL — LOW (ref 4.2–5.4)
RBC # FLD: 15.8 % — HIGH (ref 11.5–14.5)
SODIUM SERPL-SCNC: 140 MMOL/L — SIGNIFICANT CHANGE UP (ref 135–146)
TRIGL SERPL-MCNC: 128 MG/DL — SIGNIFICANT CHANGE UP
WBC # BLD: 6.44 K/UL — SIGNIFICANT CHANGE UP (ref 4.8–10.8)
WBC # FLD AUTO: 6.44 K/UL — SIGNIFICANT CHANGE UP (ref 4.8–10.8)

## 2022-08-27 PROCEDURE — 99238 HOSP IP/OBS DSCHRG MGMT 30/<: CPT

## 2022-08-27 RX ADMIN — Medication 50 MILLIGRAM(S): at 05:14

## 2022-08-27 RX ADMIN — Medication 1000 UNIT(S): at 12:09

## 2022-08-27 RX ADMIN — Medication 5 MILLIGRAM(S): at 05:15

## 2022-08-27 RX ADMIN — FAMOTIDINE 20 MILLIGRAM(S): 10 INJECTION INTRAVENOUS at 12:09

## 2022-08-27 NOTE — PROGRESS NOTE ADULT - SUBJECTIVE AND OBJECTIVE BOX
CURTIS WATERS  76y  [facility] [id number]      Patient is a 76y old  Female who presents with a chief complaint of hyponatremia (27 Aug 2022 10:58)      INTERVAL HPI/OVERNIGHT EVENTS:  no events overnight       REVIEW OF SYSTEMS:  CONSTITUTIONAL: No fever, weight loss, or fatigue  EYES: No eye pain, visual disturbances, or discharge  ENMT:  No difficulty hearing, tinnitus, vertigo; No sinus or throat pain  NECK: No pain or stiffness  BREASTS: No pain, masses, or nipple discharge  RESPIRATORY: No cough, wheezing, chills or hemoptysis; No shortness of breath  CARDIOVASCULAR: No chest pain, palpitations, dizziness, or leg swelling  GASTROINTESTINAL: No abdominal or epigastric pain. No nausea, vomiting, or hematemesis; No diarrhea or constipation. No melena or hematochezia.  GENITOURINARY: No dysuria, frequency, hematuria, or incontinence  NEUROLOGICAL: No headaches, memory loss, loss of strength, numbness, or tremors  SKIN: No itching, burning, rashes, or lesions   LYMPH NODES: No enlarged glands  ENDOCRINE: No heat or cold intolerance; No hair loss  MUSCULOSKELETAL: No joint pain or swelling; No muscle, back, or extremity pain  PSYCHIATRIC: No depression, anxiety, mood swings, or difficulty sleeping  HEME/LYMPH: No easy bruising, or bleeding gums  ALLERY AND IMMUNOLOGIC: No hives or eczema  FAMILY HISTORY:    T(C): 36.3 (08-27-22 @ 07:14), Max: 36.3 (08-27-22 @ 07:14)  HR: 60 (08-27-22 @ 07:14) (60 - 65)  BP: 110/66 (08-27-22 @ 07:14) (107/55 - 143/64)  RR: 18 (08-27-22 @ 07:14) (18 - 18)  SpO2: --  Wt(kg): --Vital Signs Last 24 Hrs  T(C): 36.3 (27 Aug 2022 07:14), Max: 36.3 (27 Aug 2022 07:14)  T(F): 97.3 (27 Aug 2022 07:14), Max: 97.3 (27 Aug 2022 07:14)  HR: 60 (27 Aug 2022 07:14) (60 - 65)  BP: 110/66 (27 Aug 2022 07:14) (107/55 - 143/64)  BP(mean): --  RR: 18 (27 Aug 2022 07:14) (18 - 18)  SpO2: --        PHYSICAL EXAM:  GENERAL: NAD, well-groomed, well-developed  HEAD:  Atraumatic, Normocephalic  EYES: EOMI, PERRLA, conjunctiva and sclera clear  ENMT: No tonsillar erythema, exudates, or enlargement; Moist mucous membranes, Good dentition, No lesions  NECK: Supple, No JVD, Normal thyroid  NERVOUS SYSTEM:  Alert & Oriented X3, Good concentration; Motor Strength 5/5 B/L upper and lower extremities; DTRs 2+ intact and symmetric  PULM: Clear to auscultation bilaterally  CARDIAC: Regular rate and rhythm; No murmurs, rubs, or gallops  GI: Soft, Nontender, Nondistended; Bowel sounds present  EXTREMITIES:  2+ Peripheral Pulses, No clubbing, cyanosis, or edema  LYMPH: No lymphadenopathy noted  SKIN: No rashes or lesions    Consultant(s) Notes Reviewed:  [x ] YES  [ ] NO  Care Discussed with Consultants/Other Providers [ x] YES  [ ] NO    LABS:                            11.2   6.44  )-----------( 153      ( 27 Aug 2022 06:00 )             32.4   08-27    140  |  106  |  20  ----------------------------<  76  4.8   |  24  |  1.0    Ca    9.0      27 Aug 2022 06:00  Mg     2.1     08-27    TPro  5.9<L>  /  Alb  3.8  /  TBili  0.7  /  DBili  x   /  AST  42<H>  /  ALT  51<H>  /  AlkPhos  78  08-27            atorvastatin 20 milliGRAM(s) Oral at bedtime  cholecalciferol 1000 Unit(s) Oral daily  famotidine    Tablet 20 milliGRAM(s) Oral daily  flecainide 50 milliGRAM(s) Oral every 12 hours  metoprolol tartrate 50 milliGRAM(s) Oral three times a day  oxybutynin 5 milliGRAM(s) Oral two times a day  rivaroxaban 20 milliGRAM(s) Oral with dinner      HEALTH ISSUES - PROBLEM Dx:          Case Discussed with House Staff     Spectra [phone]

## 2022-08-27 NOTE — DISCHARGE NOTE PROVIDER - CARE PROVIDER_API CALL
Andrea Cota  INTERNAL MEDICINE  [address] [place] [address]  Phone: [phone]  Fax: [phone]  Established Patient  Follow Up Time: 2 weeks    Monico Capone)  Gastroenterology; Internal Medicine  [address] [place] [address]  Phone: [phone]  Fax: [phone]  Follow Up Time: 2 weeks

## 2022-08-27 NOTE — PROGRESS NOTE ADULT - ASSESSMENT
HPI:  Patient is 76-year-old female with PMH of Afib on Xarelto, s/p pacemaker, mitral valve repair, hypertension, hyperlipidemia, overactive bladder presenting presented complaining of nausea and thirst for 2 days associated with excessive water consumption. Patient reports 2 days ago she started to experience severe thirst, thought she was dehydrated, which prompted her to drink more water. Yesterday, she started to feel not herself, "like something was not right", after which she presented to the hospital. Patient reports she consumed about 4 bottles of water yesterday. Patient also endorses decreased appetite for couple of days and nausea, but denies vomiting. Patient denies headache, dizziness, change in vision, fevers, chest pain, palpitations, diarrhea, constipation, dysuria, polyuria, numbness.    #Hyponatremia secondary to polydipsia - resolved  encouraged to consume adequate solute as well at home     #Chronic Afib on Xarelto, s/p pacemaker    # mitral valve repair    #hypertension  BP: 110/66 (27 Aug 2022 07:14) (107/55 - 143/64)  controlled    # hyperlipidemia    #overactive bladder    #Vitamin d deficiency on supplementation     Time spent 44 min on dc     PROGRESS NOTE HANDOFF    Pending: dc home    Family discussion: patient verbalized understanding and agreeable to plan of care     Disposition: Home

## 2022-08-27 NOTE — DISCHARGE NOTE PROVIDER - NSDCFUSCHEDAPPT_GEN_ALL_CORE_FT
[facility]  Cardio [address] [facility]  Scheduled Appointment: 10/18/2022

## 2022-08-27 NOTE — DISCHARGE NOTE PROVIDER - HOSPITAL COURSE
HPI:    Patient is 76-year-old female with PMH of Afib on Xarelto, s/p pacemaker, mitral valve repair, hypertension, hyperlipidemia, overactive bladder presenting presented complaining of nausea and thirst for 2 days associated with excessive water consumption. Patient reports 2 days ago she started to experience severe thirst, thought she was dehydrated, which prompted her to drink more water. Yesterday, she started to feel not herself, "like something was not right", after which she presented to the hospital. Patient reports she consumed about 4 bottles of water yesterday. Patient also endorses decreased appetite for couple of days and nausea, but denies vomiting. Patient denies headache, dizziness, change in vision, fevers, chest pain, palpitations, diarrhea, constipation, dysuria, polyuria, numbness.    ED:      TMax: 97.6F HR: 60 BP: 175/81 RR: 18 SpO2: 97% RA  Labs were significant for Na 122, Cl 90, /ALT 72.    CXR demonstrated right midlung field linear atelectasis. Otherwise stable findings.  US Abdomen Upper Quadrant Right (08.26.22): Diffuse nonspecific gallbladder wall thickening with apparent pericholecystic fluid. This is nonspecific and if clinically warranted, nuclear medicine HIDA scan may be of benefit. Unremarkable sonographic appearance of the liver    Pt received 1 L NS bolus in ED, was admitted for hyponatremia management.    Pt was admitted to medicine    Hospital Course:    Pt continued on NS for 10 hours, fluid restriction, Na normalized by 8/27AM, sxs resolved w/o complication.     Pt LFTs continue to trend down, will f/u OP GI    Chronic conditions were managed w/ home medications and supportive care w/o complication.     Pt eager to be d/c'd, HD stable, sxs resolved, will be d/c'd home no needs w/ no new medications or changes to home medications.

## 2022-08-27 NOTE — DISCHARGE NOTE NURSING/CASE MANAGEMENT/SOCIAL WORK - NSDCPEFALRISK_GEN_ALL_CORE
For information on Fall & Injury Prevention, visit: [email or web address] OR  [email or web address] OR  https://www.cdc.gov/steadi/patient.html

## 2022-08-27 NOTE — DISCHARGE NOTE NURSING/CASE MANAGEMENT/SOCIAL WORK - PATIENT PORTAL LINK FT
You can access the FollowMyHealth Patient Portal offered by [facility] by registering at the following website: [email or web address]. By joining [employer]’s FollowMyHealth portal, you will also be able to view your health information using other applications (apps) compatible with our system.

## 2022-08-27 NOTE — DISCHARGE NOTE PROVIDER - NSDCMRMEDTOKEN_GEN_ALL_CORE_FT
famotidine 20 mg oral tablet: 1 tab(s) orally once a day  flecainide 50 mg oral tablet: 1 tab(s) orally every 12 hours  Metoprolol Tartrate 50 mg oral tablet: 1 tab(s) orally 3 times a day  rosuvastatin 5 mg oral tablet: 1 tab(s) orally once a day  trospium 60 mg oral capsule, extended release: 1 cap(s) orally once a day (in the morning)  Vitamin D3 125 mcg (5000 intl units) oral tablet: 1 tab(s) orally once a day  Xarelto 20 mg oral tablet: 1 tab(s) orally once a day (in the evening)

## 2022-08-27 NOTE — DISCHARGE NOTE PROVIDER - PROVIDER TOKENS
PROVIDER:[TOKEN:[[id number]],FOLLOWUP:[2 weeks],ESTABLISHEDPATIENT:[T]],PROVIDER:[TOKEN:[[id number]],FOLLOWUP:[2 weeks]]

## 2022-08-27 NOTE — DISCHARGE NOTE PROVIDER - NSDCCPCAREPLAN_GEN_ALL_CORE_FT
PRINCIPAL DISCHARGE DIAGNOSIS  Diagnosis: Hyponatremia  Assessment and Plan of Treatment: You were admitted with hyponatremia. Hyponatremia can be very dangerous and lead to life threatening situations. The hyponatremia was a result of drinking too much water in a short period of time. Going forward please pay attention to how much water you are drinking. Never drink more than 1.5L of water a day. Consider drinking more frequent small servings of water a day.  You were also found to have elevated liver enzymes on admission blood work. Your liver was imaged showing thickening of the gallbladder wall. Your liver enzymes are returning to normal and you have no symptoms, but you can follow up with GI after you are discharged.   You have not been given any new medications and no changes were made to your home medications.  Please follow up with your PCP within two weeks.

## 2022-08-28 LAB
CULTURE RESULTS: SIGNIFICANT CHANGE UP
SPECIMEN SOURCE: SIGNIFICANT CHANGE UP

## 2022-08-31 ENCOUNTER — NON-APPOINTMENT (OUTPATIENT)
Age: 77
End: 2022-08-31

## 2022-09-01 ENCOUNTER — INPATIENT (INPATIENT)
Facility: HOSPITAL | Age: 77
LOS: 0 days | Discharge: HOME | End: 2022-09-02
Attending: HOSPITALIST | Admitting: HOSPITALIST

## 2022-09-01 ENCOUNTER — EMERGENCY (EMERGENCY)
Facility: HOSPITAL | Age: 77
LOS: 0 days | Discharge: NURSING, REHAB OR LONG-TERM CARE | End: 2022-09-02
Admitting: EMERGENCY MEDICINE

## 2022-09-01 VITALS
SYSTOLIC BLOOD PRESSURE: 177 MMHG | WEIGHT: 134.92 LBS | HEIGHT: 65 IN | HEART RATE: 65 BPM | DIASTOLIC BLOOD PRESSURE: 74 MMHG | RESPIRATION RATE: 18 BRPM | OXYGEN SATURATION: 99 % | TEMPERATURE: 99 F

## 2022-09-01 DIAGNOSIS — Z88.2 ALLERGY STATUS TO SULFONAMIDES: ICD-10-CM

## 2022-09-01 DIAGNOSIS — N32.81 OVERACTIVE BLADDER: ICD-10-CM

## 2022-09-01 DIAGNOSIS — Z86.79 PERSONAL HISTORY OF OTHER DISEASES OF THE CIRCULATORY SYSTEM: ICD-10-CM

## 2022-09-01 DIAGNOSIS — Z87.448 PERSONAL HISTORY OF OTHER DISEASES OF URINARY SYSTEM: ICD-10-CM

## 2022-09-01 DIAGNOSIS — Z95.3 PRESENCE OF XENOGENIC HEART VALVE: ICD-10-CM

## 2022-09-01 DIAGNOSIS — I63.541 CEREBRAL INFARCTION DUE TO UNSPECIFIED OCCLUSION OR STENOSIS OF RIGHT CEREBELLAR ARTERY: ICD-10-CM

## 2022-09-01 DIAGNOSIS — E87.1 HYPO-OSMOLALITY AND HYPONATREMIA: ICD-10-CM

## 2022-09-01 DIAGNOSIS — I63.9 CEREBRAL INFARCTION, UNSPECIFIED: ICD-10-CM

## 2022-09-01 DIAGNOSIS — Z79.01 LONG TERM (CURRENT) USE OF ANTICOAGULANTS: ICD-10-CM

## 2022-09-01 DIAGNOSIS — R42 DIZZINESS AND GIDDINESS: ICD-10-CM

## 2022-09-01 DIAGNOSIS — I10 ESSENTIAL (PRIMARY) HYPERTENSION: ICD-10-CM

## 2022-09-01 DIAGNOSIS — I48.91 UNSPECIFIED ATRIAL FIBRILLATION: ICD-10-CM

## 2022-09-01 DIAGNOSIS — Z98.890 OTHER SPECIFIED POSTPROCEDURAL STATES: Chronic | ICD-10-CM

## 2022-09-01 DIAGNOSIS — Z88.8 ALLERGY STATUS TO OTHER DRUGS, MEDICAMENTS AND BIOLOGICAL SUBSTANCES STATUS: ICD-10-CM

## 2022-09-01 DIAGNOSIS — E78.5 HYPERLIPIDEMIA, UNSPECIFIED: ICD-10-CM

## 2022-09-01 DIAGNOSIS — Z95.0 PRESENCE OF CARDIAC PACEMAKER: Chronic | ICD-10-CM

## 2022-09-01 DIAGNOSIS — Z95.0 PRESENCE OF CARDIAC PACEMAKER: ICD-10-CM

## 2022-09-01 DIAGNOSIS — Z20.822 CONTACT WITH AND (SUSPECTED) EXPOSURE TO COVID-19: ICD-10-CM

## 2022-09-01 LAB
ALBUMIN SERPL ELPH-MCNC: 4.5 G/DL — SIGNIFICANT CHANGE UP (ref 3.5–5.2)
ALP SERPL-CCNC: 75 U/L — SIGNIFICANT CHANGE UP (ref 30–115)
ALT FLD-CCNC: 27 U/L — SIGNIFICANT CHANGE UP (ref 0–41)
ANION GAP SERPL CALC-SCNC: 9 MMOL/L — SIGNIFICANT CHANGE UP (ref 7–14)
APPEARANCE UR: CLEAR — SIGNIFICANT CHANGE UP
APTT BLD: 44.7 SEC — HIGH (ref 27–39.2)
AST SERPL-CCNC: 25 U/L — SIGNIFICANT CHANGE UP (ref 0–41)
BACTERIA # UR AUTO: NEGATIVE — SIGNIFICANT CHANGE UP
BASOPHILS # BLD AUTO: 0.08 K/UL — SIGNIFICANT CHANGE UP (ref 0–0.2)
BASOPHILS NFR BLD AUTO: 1.1 % — HIGH (ref 0–1)
BILIRUB SERPL-MCNC: 0.7 MG/DL — SIGNIFICANT CHANGE UP (ref 0.2–1.2)
BILIRUB UR-MCNC: NEGATIVE — SIGNIFICANT CHANGE UP
BLD GP AB SCN SERPL QL: SIGNIFICANT CHANGE UP
BUN SERPL-MCNC: 23 MG/DL — HIGH (ref 10–20)
CALCIUM SERPL-MCNC: 9.1 MG/DL — SIGNIFICANT CHANGE UP (ref 8.5–10.1)
CHLORIDE SERPL-SCNC: 95 MMOL/L — LOW (ref 98–110)
CO2 SERPL-SCNC: 23 MMOL/L — SIGNIFICANT CHANGE UP (ref 17–32)
COLOR SPEC: COLORLESS — SIGNIFICANT CHANGE UP
CREAT SERPL-MCNC: 1.1 MG/DL — SIGNIFICANT CHANGE UP (ref 0.7–1.5)
DIFF PNL FLD: NEGATIVE — SIGNIFICANT CHANGE UP
EGFR: 52 ML/MIN/1.73M2 — LOW
EOSINOPHIL # BLD AUTO: 0.11 K/UL — SIGNIFICANT CHANGE UP (ref 0–0.7)
EOSINOPHIL NFR BLD AUTO: 1.4 % — SIGNIFICANT CHANGE UP (ref 0–8)
EPI CELLS # UR: 1 /HPF — SIGNIFICANT CHANGE UP (ref 0–5)
GLUCOSE SERPL-MCNC: 104 MG/DL — HIGH (ref 70–99)
GLUCOSE UR QL: NEGATIVE — SIGNIFICANT CHANGE UP
HCT VFR BLD CALC: 35.3 % — LOW (ref 37–47)
HGB BLD-MCNC: 12 G/DL — SIGNIFICANT CHANGE UP (ref 12–16)
HYALINE CASTS # UR AUTO: 0 /LPF — SIGNIFICANT CHANGE UP (ref 0–7)
IMM GRANULOCYTES NFR BLD AUTO: 0.3 % — SIGNIFICANT CHANGE UP (ref 0.1–0.3)
INR BLD: 2.75 RATIO — HIGH (ref 0.65–1.3)
KETONES UR-MCNC: NEGATIVE — SIGNIFICANT CHANGE UP
LEUKOCYTE ESTERASE UR-ACNC: ABNORMAL
LYMPHOCYTES # BLD AUTO: 2.69 K/UL — SIGNIFICANT CHANGE UP (ref 1.2–3.4)
LYMPHOCYTES # BLD AUTO: 35.3 % — SIGNIFICANT CHANGE UP (ref 20.5–51.1)
MAGNESIUM SERPL-MCNC: 2.2 MG/DL — SIGNIFICANT CHANGE UP (ref 1.8–2.4)
MCHC RBC-ENTMCNC: 31.9 PG — HIGH (ref 27–31)
MCHC RBC-ENTMCNC: 34 G/DL — SIGNIFICANT CHANGE UP (ref 32–37)
MCV RBC AUTO: 93.9 FL — SIGNIFICANT CHANGE UP (ref 81–99)
MONOCYTES # BLD AUTO: 0.5 K/UL — SIGNIFICANT CHANGE UP (ref 0.1–0.6)
MONOCYTES NFR BLD AUTO: 6.6 % — SIGNIFICANT CHANGE UP (ref 1.7–9.3)
NEUTROPHILS # BLD AUTO: 4.21 K/UL — SIGNIFICANT CHANGE UP (ref 1.4–6.5)
NEUTROPHILS NFR BLD AUTO: 55.3 % — SIGNIFICANT CHANGE UP (ref 42.2–75.2)
NITRITE UR-MCNC: NEGATIVE — SIGNIFICANT CHANGE UP
NRBC # BLD: 0 /100 WBCS — SIGNIFICANT CHANGE UP (ref 0–0)
NT-PROBNP SERPL-SCNC: 1567 PG/ML — HIGH (ref 0–300)
PH UR: 6.5 — SIGNIFICANT CHANGE UP (ref 5–8)
PLATELET # BLD AUTO: 214 K/UL — SIGNIFICANT CHANGE UP (ref 130–400)
POTASSIUM SERPL-MCNC: 4.4 MMOL/L — SIGNIFICANT CHANGE UP (ref 3.5–5)
POTASSIUM SERPL-SCNC: 4.4 MMOL/L — SIGNIFICANT CHANGE UP (ref 3.5–5)
PROT SERPL-MCNC: 6.8 G/DL — SIGNIFICANT CHANGE UP (ref 6–8)
PROT UR-MCNC: NEGATIVE — SIGNIFICANT CHANGE UP
PROTHROM AB SERPL-ACNC: 31.3 SEC — HIGH (ref 9.95–12.87)
RBC # BLD: 3.76 M/UL — LOW (ref 4.2–5.4)
RBC # FLD: 15.5 % — HIGH (ref 11.5–14.5)
RBC CASTS # UR COMP ASSIST: 0 /HPF — SIGNIFICANT CHANGE UP (ref 0–4)
SODIUM SERPL-SCNC: 127 MMOL/L — LOW (ref 135–146)
SP GR SPEC: 1.01 — LOW (ref 1.01–1.03)
TROPONIN T SERPL-MCNC: <0.01 NG/ML — SIGNIFICANT CHANGE UP
UROBILINOGEN FLD QL: SIGNIFICANT CHANGE UP
WBC # BLD: 7.61 K/UL — SIGNIFICANT CHANGE UP (ref 4.8–10.8)
WBC # FLD AUTO: 7.61 K/UL — SIGNIFICANT CHANGE UP (ref 4.8–10.8)
WBC UR QL: 3 /HPF — SIGNIFICANT CHANGE UP (ref 0–5)

## 2022-09-01 PROCEDURE — 99285 EMERGENCY DEPT VISIT HI MDM: CPT

## 2022-09-01 PROCEDURE — 70450 CT HEAD/BRAIN W/O DYE: CPT | Mod: 26,MA

## 2022-09-01 PROCEDURE — 93010 ELECTROCARDIOGRAM REPORT: CPT

## 2022-09-01 RX ORDER — SODIUM CHLORIDE 9 MG/ML
1000 INJECTION INTRAMUSCULAR; INTRAVENOUS; SUBCUTANEOUS ONCE
Refills: 0 | Status: DISCONTINUED | OUTPATIENT
Start: 2022-09-01 | End: 2022-09-02

## 2022-09-01 RX ORDER — SODIUM CHLORIDE 9 MG/ML
500 INJECTION, SOLUTION INTRAVENOUS ONCE
Refills: 0 | Status: DISCONTINUED | OUTPATIENT
Start: 2022-09-01 | End: 2022-09-01

## 2022-09-01 NOTE — ED PROVIDER NOTE - OBJECTIVE STATEMENT
75 yo F with PMH of afib on xarelto, HTN, HLD, PPM, recent admission for hyponatremia (122, 8/25-27, has not seen nephrology) presenting for dizziness and "brain" fog, stating symptoms are similar to last admission. Patient denies any fever, travel, vision changes, headache, n/w/t, CP, SOB, NVD, dysuria, hematuria, melena, hematochezia, drug/alcohol use.

## 2022-09-01 NOTE — ED PROVIDER NOTE - ATTENDING CONTRIBUTION TO CARE
76yF recently admitted for hyponatremia (possibly due to excess water ingestion though incomplete workup done and pt has not followed up) p/w inc thirst, nausea and "foggy" mind.  No fevers.

## 2022-09-01 NOTE — ED PROVIDER NOTE - PROGRESS NOTE DETAILS
EK - Pt's symptoms seem more severe than expected for Na 127 (mita given recent Na 122).  Will broaden workup to assess for possible infectious etiologies prior to admission for hyponatremia and further care.  Pt signed out to Dr. Esparza. BK: Patient with potential cerebellar infarct on CT. Neuro exam non-focal. Neurology consulted. BK: Discussed case with Dr. Ramirez. Okay to admit patient to medicine for MRI tomorrow and hyponatremia workup. Will give 325 aspirin now and start patient on aspirin daily.

## 2022-09-01 NOTE — ED PROVIDER NOTE - NS ED ROS FT
Constitutional: (-) fever, (-) chills, (-) lethargy  Eyes: (-) eye pain, (-) visual changes, (-) discharge  ENMT: (-) nasal congestion, (-) sore throat. (-) neck pain (-) neck stiffness  Respiratory: (-) cough, (-) SOB, (-) SZYMANSKI, (-) respiratory distress  Cardiac: (-) chest pain, (-) palpitations  GI: (-) abdominal pain, (-) nausea, (-) vomiting, (-) diarrhea.  :  (-) dysuria, (-) hematuria, (-) frequency   MS:  (-) back pain, (-) joint pain.  Neuro:  (-) headache, (-) numbness, (-) focal weakness, (-) tingling   Skin:  (-) rash  Except as documented in the HPI,  all other systems are negative

## 2022-09-01 NOTE — ED ADULT NURSE NOTE - NSICDXPASTMEDICALHX_GEN_ALL_CORE_FT
PAST MEDICAL HISTORY:  H/O mitral valve repair     History of atrial fibrillation     Hyperlipidemia     Hypertension     Overactive bladder

## 2022-09-01 NOTE — ED PROVIDER NOTE - NS_BEDUNITTYPES_ED_ALL_ED
CSE Block    Date/Time: 12/2/2021 12:19 PM  Performed by: Linda Oreilly CRNA  Authorized by: Linda Oreilly MED/SURG

## 2022-09-01 NOTE — ED ADULT TRIAGE NOTE - CHIEF COMPLAINT QUOTE
Patient complaining of increasing fatigue, increased thirst, nausea and 'foggy headed'. Patient was seen in ER last week for same symptoms and was told she was hyponatremic, has not seen nephrologist or had post hospitalization follow up.

## 2022-09-01 NOTE — ED PROVIDER NOTE - CLINICAL SUMMARY MEDICAL DECISION MAKING FREE TEXT BOX
pt stable in ED. labs w/ mild hyponatremia. ct showing cerebellar infarcts. case d/w neuro who rec admit to med for mri.

## 2022-09-01 NOTE — ED ADULT NURSE NOTE - OBJECTIVE STATEMENT
pt presents with fatigue and "feeling foggy" since last week. pt was discharged last week with Hyponatremia. pt initially felt better after discharge then same symptoms return again. pt was seen in urgent care where they suggested for the pt to go to the ED today.

## 2022-09-02 ENCOUNTER — TRANSCRIPTION ENCOUNTER (OUTPATIENT)
Age: 77
End: 2022-09-02

## 2022-09-02 VITALS
SYSTOLIC BLOOD PRESSURE: 140 MMHG | DIASTOLIC BLOOD PRESSURE: 61 MMHG | RESPIRATION RATE: 18 BRPM | HEART RATE: 62 BPM | TEMPERATURE: 98 F | OXYGEN SATURATION: 100 %

## 2022-09-02 PROBLEM — I10 ESSENTIAL (PRIMARY) HYPERTENSION: Chronic | Status: ACTIVE | Noted: 2022-08-26

## 2022-09-02 PROBLEM — Z86.79 PERSONAL HISTORY OF OTHER DISEASES OF THE CIRCULATORY SYSTEM: Chronic | Status: ACTIVE | Noted: 2022-08-26

## 2022-09-02 PROBLEM — E78.5 HYPERLIPIDEMIA, UNSPECIFIED: Chronic | Status: ACTIVE | Noted: 2022-08-26

## 2022-09-02 PROBLEM — N32.81 OVERACTIVE BLADDER: Chronic | Status: ACTIVE | Noted: 2022-08-26

## 2022-09-02 LAB
ALBUMIN SERPL ELPH-MCNC: 4.5 G/DL — SIGNIFICANT CHANGE UP (ref 3.5–5.2)
ALP SERPL-CCNC: 83 U/L — SIGNIFICANT CHANGE UP (ref 30–115)
ALT FLD-CCNC: 26 U/L — SIGNIFICANT CHANGE UP (ref 0–41)
ANION GAP SERPL CALC-SCNC: 16 MMOL/L — HIGH (ref 7–14)
ANION GAP SERPL CALC-SCNC: 6 MMOL/L — LOW (ref 7–14)
AST SERPL-CCNC: 26 U/L — SIGNIFICANT CHANGE UP (ref 0–41)
BILIRUB SERPL-MCNC: 0.8 MG/DL — SIGNIFICANT CHANGE UP (ref 0.2–1.2)
BUN SERPL-MCNC: 19 MG/DL — SIGNIFICANT CHANGE UP (ref 10–20)
BUN SERPL-MCNC: 21 MG/DL — HIGH (ref 10–20)
CALCIUM SERPL-MCNC: 9.2 MG/DL — SIGNIFICANT CHANGE UP (ref 8.5–10.1)
CALCIUM SERPL-MCNC: 9.6 MG/DL — SIGNIFICANT CHANGE UP (ref 8.5–10.1)
CHLORIDE SERPL-SCNC: 102 MMOL/L — SIGNIFICANT CHANGE UP (ref 98–110)
CHLORIDE SERPL-SCNC: 103 MMOL/L — SIGNIFICANT CHANGE UP (ref 98–110)
CO2 SERPL-SCNC: 21 MMOL/L — SIGNIFICANT CHANGE UP (ref 17–32)
CO2 SERPL-SCNC: 27 MMOL/L — SIGNIFICANT CHANGE UP (ref 17–32)
CREAT SERPL-MCNC: 1 MG/DL — SIGNIFICANT CHANGE UP (ref 0.7–1.5)
CREAT SERPL-MCNC: 1 MG/DL — SIGNIFICANT CHANGE UP (ref 0.7–1.5)
EGFR: 58 ML/MIN/1.73M2 — LOW
EGFR: 58 ML/MIN/1.73M2 — LOW
GLUCOSE SERPL-MCNC: 86 MG/DL — SIGNIFICANT CHANGE UP (ref 70–99)
GLUCOSE SERPL-MCNC: 90 MG/DL — SIGNIFICANT CHANGE UP (ref 70–99)
HCT VFR BLD CALC: 38.7 % — SIGNIFICANT CHANGE UP (ref 37–47)
HGB BLD-MCNC: 13.1 G/DL — SIGNIFICANT CHANGE UP (ref 12–16)
MAGNESIUM SERPL-MCNC: 2.5 MG/DL — HIGH (ref 1.8–2.4)
MCHC RBC-ENTMCNC: 31.6 PG — HIGH (ref 27–31)
MCHC RBC-ENTMCNC: 33.9 G/DL — SIGNIFICANT CHANGE UP (ref 32–37)
MCV RBC AUTO: 93.5 FL — SIGNIFICANT CHANGE UP (ref 81–99)
NRBC # BLD: 0 /100 WBCS — SIGNIFICANT CHANGE UP (ref 0–0)
OSMOLALITY SERPL: 283 MOS/KG — SIGNIFICANT CHANGE UP (ref 280–301)
OSMOLALITY UR: 487 MOS/KG — SIGNIFICANT CHANGE UP (ref 50–1200)
PLATELET # BLD AUTO: 214 K/UL — SIGNIFICANT CHANGE UP (ref 130–400)
POTASSIUM SERPL-MCNC: 4.3 MMOL/L — SIGNIFICANT CHANGE UP (ref 3.5–5)
POTASSIUM SERPL-MCNC: 4.9 MMOL/L — SIGNIFICANT CHANGE UP (ref 3.5–5)
POTASSIUM SERPL-SCNC: 4.3 MMOL/L — SIGNIFICANT CHANGE UP (ref 3.5–5)
POTASSIUM SERPL-SCNC: 4.9 MMOL/L — SIGNIFICANT CHANGE UP (ref 3.5–5)
PROT SERPL-MCNC: 7 G/DL — SIGNIFICANT CHANGE UP (ref 6–8)
RBC # BLD: 4.14 M/UL — LOW (ref 4.2–5.4)
RBC # FLD: 15.5 % — HIGH (ref 11.5–14.5)
SODIUM SERPL-SCNC: 136 MMOL/L — SIGNIFICANT CHANGE UP (ref 135–146)
SODIUM SERPL-SCNC: 139 MMOL/L — SIGNIFICANT CHANGE UP (ref 135–146)
WBC # BLD: 7.85 K/UL — SIGNIFICANT CHANGE UP (ref 4.8–10.8)
WBC # FLD AUTO: 7.85 K/UL — SIGNIFICANT CHANGE UP (ref 4.8–10.8)

## 2022-09-02 PROCEDURE — 99236 HOSP IP/OBS SAME DATE HI 85: CPT

## 2022-09-02 PROCEDURE — 99283 EMERGENCY DEPT VISIT LOW MDM: CPT

## 2022-09-02 RX ORDER — ASPIRIN/CALCIUM CARB/MAGNESIUM 324 MG
325 TABLET ORAL ONCE
Refills: 0 | Status: COMPLETED | OUTPATIENT
Start: 2022-09-02 | End: 2022-09-02

## 2022-09-02 RX ORDER — METOPROLOL TARTRATE 50 MG
50 TABLET ORAL THREE TIMES A DAY
Refills: 0 | Status: DISCONTINUED | OUTPATIENT
Start: 2022-09-02 | End: 2022-09-02

## 2022-09-02 RX ORDER — TROSPIUM CHLORIDE 20 MG/1
1 TABLET, FILM COATED ORAL
Qty: 0 | Refills: 0 | DISCHARGE

## 2022-09-02 RX ORDER — ATORVASTATIN CALCIUM 80 MG/1
20 TABLET, FILM COATED ORAL AT BEDTIME
Refills: 0 | Status: DISCONTINUED | OUTPATIENT
Start: 2022-09-02 | End: 2022-09-02

## 2022-09-02 RX ORDER — OXYBUTYNIN CHLORIDE 5 MG
5 TABLET ORAL
Refills: 0 | Status: DISCONTINUED | OUTPATIENT
Start: 2022-09-02 | End: 2022-09-02

## 2022-09-02 RX ORDER — FAMOTIDINE 10 MG/ML
20 INJECTION INTRAVENOUS DAILY
Refills: 0 | Status: DISCONTINUED | OUTPATIENT
Start: 2022-09-02 | End: 2022-09-02

## 2022-09-02 RX ORDER — FLECAINIDE ACETATE 50 MG
50 TABLET ORAL EVERY 12 HOURS
Refills: 0 | Status: DISCONTINUED | OUTPATIENT
Start: 2022-09-02 | End: 2022-09-02

## 2022-09-02 RX ORDER — ATORVASTATIN CALCIUM 80 MG/1
80 TABLET, FILM COATED ORAL AT BEDTIME
Refills: 0 | Status: DISCONTINUED | OUTPATIENT
Start: 2022-09-02 | End: 2022-09-02

## 2022-09-02 RX ORDER — RIVAROXABAN 15 MG-20MG
20 KIT ORAL
Refills: 0 | Status: DISCONTINUED | OUTPATIENT
Start: 2022-09-02 | End: 2022-09-02

## 2022-09-02 RX ADMIN — Medication 50 MILLIGRAM(S): at 06:00

## 2022-09-02 RX ADMIN — Medication 50 MILLIGRAM(S): at 05:59

## 2022-09-02 RX ADMIN — Medication 5 MILLIGRAM(S): at 06:00

## 2022-09-02 RX ADMIN — Medication 50 MILLIGRAM(S): at 13:48

## 2022-09-02 RX ADMIN — Medication 325 MILLIGRAM(S): at 05:59

## 2022-09-02 RX ADMIN — FAMOTIDINE 20 MILLIGRAM(S): 10 INJECTION INTRAVENOUS at 13:47

## 2022-09-02 NOTE — CONSULT NOTE ADULT - SUBJECTIVE AND OBJECTIVE BOX
Neurology Consult    Patient is a 76y old  Female who presents with a chief complaint of hyponatremia (02 Sep 2022 04:27)    Patient seen and examined at bedside. Resting comfortably in no apparent distress. AAOx3. Patient reports lightheadedness but feels than yesterday.     HPI:  Patient is 76-year-old female with PMH of Afib on Xarelto, s/p pacemaker, mitral valve repair, hypertension, hyperlipidemia, overactive bladder presenting presents for "brain fog" and lightheadedness similar to symptoms that she presented with last admission 1 week prior. However, she says the symptoms are not as bad as last time. At that time patient had excessive water consumption after having severe thirst, found to have Na 122. Patient was started on NS for 10 hours and then Na normalized to 140 and sxs resolved w/o complication. Patient was discharged and encouraged adequate oral intake. Today again reports excessive thirst. Has not had excessive water intake this time as she was instructed not to. Patient denies headache, dizziness, change in vision, fevers, chest pain, palpitations, diarrhea, constipation, dysuria, polyuria, numbness.    Vitals were stable in the ED. Labs were significant for Na 128. Serum osm 283, urine osm 487.   CTH revealed no acute intracranial hemorrhage, mass-effect or midline shift. New small low-attenuation area within the right cerebellum compared to CT head 2022 suspicious for infarct. Correlation with MRI brain  may be obtained.  ED team discussed with neurology attending, stated team would follow up in AM. Will most likely need MRI.      (02 Sep 2022 04:27)      PAST MEDICAL & SURGICAL HISTORY:  History of atrial fibrillation      H/O mitral valve repair      Hypertension      Hyperlipidemia      Overactive bladder      Cardiac pacemaker      H/O mitral valve repair          FAMILY HISTORY:      Social History: No smoking, No drinking, No drug use    Allergies    fluconazole (Hives)  sulfa drugs (Unknown)    Intolerances        MEDICATIONS  (STANDING):  atorvastatin 80 milliGRAM(s) Oral at bedtime  famotidine    Tablet 20 milliGRAM(s) Oral daily  flecainide 50 milliGRAM(s) Oral every 12 hours  metoprolol tartrate 50 milliGRAM(s) Oral three times a day  oxybutynin 5 milliGRAM(s) Oral two times a day  rivaroxaban 20 milliGRAM(s) Oral with dinner    MEDICATIONS  (PRN):      Review of systems:    Constitutional: as per HPI  Eyes: No eye pain or discharge  ENMT:  No difficulty hearing; No sinus or throat pain  Neck: No pain or stiffness  Respiratory: No cough, wheezing, chills or hemoptysis  Cardiovascular: No chest pain, palpitations, shortness of breath, dyspnea on exertion  Gastrointestinal: No abdominal pain, nausea, vomiting or hematemesis; No diarrhea or constipation.   Genitourinary: No dysuria, frequency, hematuria or incontinence  Neurological: As per HPI  Skin: No rashes or lesions   Endocrine: No heat or cold intolerance; No hair loss  Musculoskeletal: No joint pain or swelling  Psychiatric: No depression, anxiety, mood swings  Heme/Lymph: No easy bruising or bleeding gums    Vital Signs Last 24 Hrs  T(C): 36.6 (02 Sep 2022 08:47), Max: 37.1 (01 Sep 2022 19:41)  T(F): 97.8 (02 Sep 2022 08:47), Max: 98.8 (01 Sep 2022 19:41)  HR: 80 (02 Sep 2022 08:47) (60 - 80)  BP: 147/74 (02 Sep 2022 08:47) (130/60 - 177/74)  BP(mean): --  RR: 18 (02 Sep 2022 08:47) (18 - 18)  SpO2: 98% (02 Sep 2022 08:47) (97% - 99%)    Parameters below as of 02 Sep 2022 08:47  Patient On (Oxygen Delivery Method): room air        Examination:  General:  Appearance is consistent with chronologic age.  No abnormal facies.  Gross skin survey within normal limits.   Cognitive/Language:  The patient is oriented to person, place, time and date.  Recent and remote memory intact.  Fund of knowledge is intact and normal.  Language with normal repetition, comprehension and naming.  Nondysarthric.    Eyes: EOMI w/o nystagmus, skew or reported double vision.  PERRL.  No ptosis/weakness of eyelid closure.    Face:  Facial sensation normal V1 - 3, no facial asymmetry.    Ears/Nose/Throat:  Hearing grossly intact b/l.  Palate elevates midline.  Tongue and uvula midline.   Motor examination:   Normal tone, bulk and range of motion.  No tenderness, twitching, tremors or involuntary movements.  Formal Muscle Strength Testing: (MRC grade R/L) 5/5 UE; 5/5 LE.  No observable drift.  Reflexes:   2+ b/l pectoralis, biceps, triceps, brachioradialis, patella and Achilles.  Plantar response downgoing b/l.  Jaw jerk, Jomar, clonus absent.  Sensory examination:   Intact to light touch and pinprick, pain, temperature and proprioception and vibration in all extremities.  Cerebellum:   FTN/HKS intact with normal SHANE in all limbs.  No dysmetria or dysdiadokinesia.  Gait narrow based and normal. Off-balance with heel-to-toe walking.      Labs:   CBC Full  -  ( 01 Sep 2022 21:19 )  WBC Count : 7.61 K/uL  RBC Count : 3.76 M/uL  Hemoglobin : 12.0 g/dL  Hematocrit : 35.3 %  Platelet Count - Automated : 214 K/uL  Mean Cell Volume : 93.9 fL  Mean Cell Hemoglobin : 31.9 pg  Mean Cell Hemoglobin Concentration : 34.0 g/dL  Auto Neutrophil # : 4.21 K/uL  Auto Lymphocyte # : 2.69 K/uL  Auto Monocyte # : 0.50 K/uL  Auto Eosinophil # : 0.11 K/uL  Auto Basophil # : 0.08 K/uL  Auto Neutrophil % : 55.3 %  Auto Lymphocyte % : 35.3 %  Auto Monocyte % : 6.6 %  Auto Eosinophil % : 1.4 %  Auto Basophil % : 1.1 %        136  |  103  |  19  ----------------------------<  90  4.3   |  27  |  1.0    Ca    9.2      02 Sep 2022 05:05  Mg     2.2         TPro  6.8  /  Alb  4.5  /  TBili  0.7  /  DBili  x   /  AST  25  /  ALT  27  /  AlkPhos  75  09-    LIVER FUNCTIONS - ( 01 Sep 2022 21:19 )  Alb: 4.5 g/dL / Pro: 6.8 g/dL / ALK PHOS: 75 U/L / ALT: 27 U/L / AST: 25 U/L / GGT: x           PT/INR - ( 01 Sep 2022 21:19 )   PT: 31.30 sec;   INR: 2.75 ratio         PTT - ( 01 Sep 2022 21:19 )  PTT:44.7 sec  Urinalysis Basic - ( 01 Sep 2022 23:40 )    Color: Colorless / Appearance: Clear / S.007 / pH: x  Gluc: x / Ketone: Negative  / Bili: Negative / Urobili: <2 mg/dL   Blood: x / Protein: Negative / Nitrite: Negative   Leuk Esterase: Moderate / RBC: 0 /HPF / WBC 3 /HPF   Sq Epi: x / Non Sq Epi: 1 /HPF / Bacteria: Negative          Neuroimaging:  NCHCT: CT Head No Cont:   ACC: [id number] EXAM:  CT BRAIN                          PROCEDURE DATE:  2022          INTERPRETATION:  Clinical History / Reason for exam: dizziness    Technique: CT head without contrast. CT of the head was performed without   contrast injection. Coronal and sagittal reformatted images were   submitted for anatomic correlation.    COMPARISON CT: 2022    FINDINGS:  Ventricles and sulci are compatible with parenchymal volume loss.    There is no acute intracranial hemorrhage, extra-axial fluid collections   or midline shift.    There is a new low-attenuation area within the right cerebellum (3/9;   7/49).    Bilateral subcortical and periventricular white matter hypodensities,   which likely represent chronic microvascular changes.    There is no depressed calvarial fracture. The mastoid air cells are   aerated.  The paranasal sinuses are aerated.    Beam hardening artifact is noted overlying the brain stem and posterior   fossa which is inherent to CT in this location.    IMPRESSION:    No acute intracranial hemorrhage, mass-effect or midline shift.    New small low-attenuation area within the right cerebellum compared to CT   head 2022 suspicious for infarct. Correlation with MRI brain  may be   obtained.        ---End of Report ---            MIRANDA BILLINGSLEY MD; Attending Radiologist  This document has been electronically signed. Sep  2 2022 12:10AM (22 @ 23:55)      22 @ 09:26      Assessment: This is a 76 year old female presenting with lightheadedness and brain fog, found to be hyponatremic. Neurology consulted for findings on CT scan: New small low-attenuation area within the right cerebellum compared to CT head 2022 suspicious for infarct. Findings discussed with neurology attending and stroke team- infarct was present on previous CT scan in February. Patient has been on Xarelto for 7 years with good compliance. Patient has no focal deficits/signs of stroke on examination. Patient's reported "brain fog" is likely due to hyponatremia, which has improved with correction of sodium.    Plan  MRI not needed at this time  complete hyponatremia work-up- urine sodium  rest of care per primary team

## 2022-09-02 NOTE — H&P ADULT - ASSESSMENT
Patient is 76-year-old female with PMH of Afib on Xarelto, s/p pacemaker, mitral valve repair, hypertension, hyperlipidemia, overactive bladder presenting presented complaining of nausea and thirst for 2 days associated with excessive water consumption. Patient reports 2 days ago she started to experience severe thirst, thought she was dehydrated, which prompted her to drink more water. Yesterday, she started to feel not herself, "like something was not right", after which she presented to the hospital.     # hyponatremia   - Na 127 on admission. Serum osm 283, urine osm 487.   - recent admission for hyponatremia 2/2 polydipsia   - s/p 1 L NS and 500ml LR in ED  - fluid restrict for now   - send urine na   - consult nephrology     #r/o stroke   - CTH revealed no acute intracranial hemorrhage, mass-effect or midline shift. New small low-attenuation area within the right cerebellum compared to CT head 2/26/2022 suspicious for infarct.   - ED team discussed with neurology attending, stated team would follow up in AM. Will most likely need MRI.   - given  in the ED. Started on atorvastatin 80mg   - neuro checks   - PTOT    # Afib on Xarelto,   #Hs  of pacemaker,   # Hx mitral valve repair,  - c/w Xarelto, flecainide  - Cardiologist is Dr. Ng    #hypertension,   - c/w metoprolol tartrate 50 mg TID    #hyperlipidemia,  - on rosuvastatin at home, c/w atorvastatin while in patient    #overactive bladder  - c/w oxybutynin    Diet: dash  Activity: as toleratad  DVT Prophylaxis: on Xarelto  Code Status: FULL code  Disposition: medicine admit   Patient is 76-year-old female with PMH of Afib on Xarelto, s/p pacemaker, mitral valve repair, hypertension, hyperlipidemia, overactive bladder presents for hyponatremia    # hyponatremia   - Na 127 on admission. Serum osm 283, urine osm 487.   - recent admission for hyponatremia 2/2 polydipsia   - s/p 1 L NS and 500ml LR in ED  - fluid restrict for now   - send urine na   - consult nephrology     #r/o stroke   - CTH revealed no acute intracranial hemorrhage, mass-effect or midline shift. New small low-attenuation area within the right cerebellum compared to CT head 2/26/2022 suspicious for infarct.   - ED team discussed with neurology attending, stated team would follow up in AM. Will most likely need MRI. If going for MRI f/u PPM ([employer])  - given  in the ED. Started on atorvastatin 80mg   - neuro checks   - PTOT    # Afib on Xarelto,   #Hs  of pacemaker ([employer])  # Hx mitral valve repair  #HTN  - c/w Xarelto, flecainide, metoprolol tartrate 50 mg TID  - Cardiologist is Dr. Ng    #hyperlipidemia,- on rosuvastatin at home, c/w atorvastatin while in patient  #overactive bladder- c/w oxybutynin    Diet: dash  Activity: AAT  DVT Prophylaxis: on Xarelto  Code Status: FULL code  Disposition: medicine admit

## 2022-09-02 NOTE — DISCHARGE NOTE PROVIDER - NSDCMRMEDTOKEN_GEN_ALL_CORE_FT
famotidine 20 mg oral tablet: 1 tab(s) orally once a day  flecainide 50 mg oral tablet: 1 tab(s) orally every 12 hours  Metoprolol Tartrate 50 mg oral tablet: 1 tab(s) orally 3 times a day  rosuvastatin 5 mg oral tablet: 1 tab(s) orally once a day  trospium 60 mg oral capsule, extended release: 1 cap(s) orally once a day (in the morning)  Vitamin D3 125 mcg (5000 intl units) oral tablet: 1 tab(s) orally once a day  Xarelto 20 mg oral tablet: 1 tab(s) orally once a day (in the evening)   famotidine 20 mg oral tablet: 1 tab(s) orally once a day  flecainide 50 mg oral tablet: 1 tab(s) orally every 12 hours  Metoprolol Tartrate 50 mg oral tablet: 1 tab(s) orally 3 times a day  rosuvastatin 5 mg oral tablet: 1 tab(s) orally once a day  Vitamin D3 125 mcg (5000 intl units) oral tablet: 1 tab(s) orally once a day  Xarelto 20 mg oral tablet: 1 tab(s) orally once a day (in the evening)

## 2022-09-02 NOTE — OCCUPATIONAL THERAPY INITIAL EVALUATION ADULT - GROSSLY INTACT, SENSORY
H&P reviewed. The patient was examined and there are no changes to the H&P.         Left UE/Right UE/Grossly Intact

## 2022-09-02 NOTE — CONSULT NOTE ADULT - ATTENDING COMMENTS
Patient examined this morning in ED and feels back to her normal self.  She reports previous episode of drinking lots of water and getting confused in recent past.  Hyponatremia noted on presentation which has now corrected.  She had head CT showing a small chronic right cerebellar lacune.  This is present on prior head CT from earlier this year though is seen more clearly on the current CT.  She is asymptomatic from this.  She remains on Xarelto.  I reviewed the images myself as well as with the stroke attending who concurred this is chronic finding and that there is no need for change in current anticoagulation treatment.  I advised patient to get full workup of cause for the hyponatremia including opinion from nephrologist.  Would follow guidelines for avoiding overly rapid correction of hyponatremia to minimize risk for myelinolysis in brain.  No further imaging brain imaging is required at this time.
Patient seen and examined with renal fellow   Agree with note A and plan     # hyponatremia with high Uosm c/w ADH presence  corrected   allow salt in diet  TSH checked previously wnl   can check Uric acid   check SPEP UPEP SFLC IF - can be done as OP   can follow as OP with renal

## 2022-09-02 NOTE — DISCHARGE NOTE PROVIDER - NSDCFUSCHEDAPPT_GEN_ALL_CORE_FT
[facility] Physician [facility]  Cardio [address] [facility]  Scheduled Appointment: 10/18/2022

## 2022-09-02 NOTE — H&P ADULT - NSHPPHYSICALEXAM_GEN_ALL_CORE
VITALS:   T(C): 36.6 (09-01-22 @ 23:21), Max: 37.1 (09-01-22 @ 19:41)  HR: 65 (09-01-22 @ 23:21) (65 - 65)  BP: 159/72 (09-01-22 @ 23:21) (159/72 - 177/74)  RR: 18 (09-01-22 @ 23:21) (18 - 18)  SpO2: 99% (09-01-22 @ 23:21) (99% - 99%)    GENERAL: NAD, lying in bed comfortably  HEAD:  Atraumatic, normocephalic  EYES: EOMI, PERRLA, conjunctiva and sclera clear  ENT: Moist mucous membranes  NECK: Supple, no JVD  HEART: Regular rate and rhythm, no murmurs, rubs, or gallops  LUNGS: Unlabored respirations.  Clear to auscultation bilaterally, no crackles, wheezing, or rhonchi  ABDOMEN: Soft, nontender, nondistended, +BS  EXTREMITIES: 2+ peripheral pulses bilaterally. No clubbing, cyanosis, or edema  NERVOUS SYSTEM:  A&Ox3, no focal deficits   SKIN: No rashes or lesions

## 2022-09-02 NOTE — PHYSICAL THERAPY INITIAL EVALUATION ADULT - PERTINENT HX OF CURRENT PROBLEM, REHAB EVAL
Patient is 76-year-old female with PMH of Afib on Xarelto, s/p pacemaker, mitral valve repair, hypertension, hyperlipidemia, overactive bladder presents for hyponatremia

## 2022-09-02 NOTE — H&P ADULT - NSHPLABSRESULTS_GEN_ALL_CORE
LABS/RADIOLOGY RESULTS:                          12.0   7.61  )-----------( 214      ( 01 Sep 2022 21:19 )             35.3       127<L>  |  95<L>  |  23<H>  ----------------------------<  104<H>  4.4   |  23  |  1.1    Ca    9.1      01 Sep 2022 21:19  Mg     2.2         TPro  6.8  /  Alb  4.5  /  TBili  0.7  /  DBili  x   /  AST  25  /  ALT  27  /  AlkPhos  75    PT/INR - ( 01 Sep 2022 21:19 )   PT: 31.30 sec;   INR: 2.75 ratio         PTT - ( 01 Sep 2022 21:19 )  PTT:44.7 secBlood Cultures    Urinalysis Basic - ( 01 Sep 2022 23:40 )    Color: Colorless / Appearance: Clear / S.007 / pH:   Gluc:  / Ketone: Negative  / Bili: Negative / Urobili: <2 mg/dL   Blood:  / Protein: Negative / Nitrite: Negative   Leuk Esterase: Moderate / RBC: 0 /HPF / WBC 3 /HPF   Sq Epi:  / Non Sq Epi: 1 /HPF / Bacteria: Negative

## 2022-09-02 NOTE — DISCHARGE NOTE PROVIDER - NSDCCPCAREPLAN_GEN_ALL_CORE_FT
PRINCIPAL DISCHARGE DIAGNOSIS  Diagnosis: Hyponatremia  Assessment and Plan of Treatment: You came to the hospital feeling fatigued and with "brain fog". You were found to have low sodium levels on admission. This resolved during your hospital stay.   Please follow up with the nephrologist outpatient in the next 1-2 weeks for further work up of your low sodium levels.   If you begin to feel symptoms again do not hesitate to come back to the ED.      SECONDARY DISCHARGE DIAGNOSES  Diagnosis: Cerebellar infarction  Assessment and Plan of Treatment: You were found to have a small area on the right cerebellum of your brain suspicious for a stroke. Neurology evaluated and do not believe further imaging is necessary at this time. Please coninue taking your cholesterol medication and xeralto. If you have any increased symptoms of imbalance, trouble walking, or weakness please return to the ED.

## 2022-09-02 NOTE — H&P ADULT - ATTENDING COMMENTS
76-year-old female with PMH of Afib on Xarelto, s/p pacemaker, mitral valve repair, hypertension, hyperlipidemia, overactive bladder presents for hyponatremia.    #Hyponatremia  Possibly related to recently started trospium for urinary incontinence which could cause water retention  Sodium levels corrected today  - DC trospium, pt to f/u urology for alternatives  - DC planning today    #Abnormal CTH  CTH 09/01: new small low-attenuation area within the right cerebellum compared to CTH suspicious for infarct  Neuro eval appreciated  - No need for MRI per neuro team    # Afib on Xarelto,   #Hs  of pacemaker ([employer])  # Hx mitral valve repair  #HTN  - c/w Xarelto, flecainide, metoprolol tartrate 50 mg TID  - Cardiologist is Dr. Ng    #hyperlipidemia,- on rosuvastatin at home, c/w atorvastatin while in patient    DC today if repeat BMP stable 76-year-old female with PMH of Afib on Xarelto, s/p pacemaker, mitral valve repair, hypertension, hyperlipidemia, overactive bladder presents for hyponatremia.    #Hyponatremia  Uosm, sOsm showing SIADH-like picture  Possibly related to recently started trospium for urinary incontinence which could cause water retention  Sodium levels corrected today  - DC trospium, pt to f/u urology for alternatives  - DC planning today    #Abnormal CTH  CTH 09/01: new small low-attenuation area within the right cerebellum compared to CTH suspicious for infarct  Neuro eval appreciated  - No need for MRI per neuro team    #Chronic Afib on Xarelto,   #Hs  of pacemaker ([employer])  # Hx mitral valve repair  #HTN  - c/w Xarelto, flecainide, metoprolol tartrate 50 mg TID  - Cardiologist is Dr. Ng    #hyperlipidemia,- on rosuvastatin at home, c/w atorvastatin while in patient    DC today if repeat BMP stable

## 2022-09-02 NOTE — DISCHARGE NOTE NURSING/CASE MANAGEMENT/SOCIAL WORK - PATIENT PORTAL LINK FT
You can access the FollowMyHealth Patient Portal offered by [facility] by registering at the following website: [email or web address]. By joining [employer]’s FollowMyHealth portal, you will also be able to view your health information using other applications (apps) compatible with our system.

## 2022-09-02 NOTE — OCCUPATIONAL THERAPY INITIAL EVALUATION ADULT - PERTINENT HX OF CURRENT PROBLEM, REHAB EVAL
76-year-old female with PMH of Afib on Xarelto, s/p pacemaker, mitral valve repair, hypertension, hyperlipidemia, overactive bladder presents for hyponatremia

## 2022-09-02 NOTE — DISCHARGE NOTE NURSING/CASE MANAGEMENT/SOCIAL WORK - NSDCPEFALRISK_GEN_ALL_CORE
For information on Fall & Injury Prevention, visit: [email or web address] OR  [email or web address] OR  https://www.cdc.gov/steadi/patient.html

## 2022-09-02 NOTE — DISCHARGE NOTE PROVIDER - CARE PROVIDER_API CALL
Evan Herring)  Internal Medicine; Nephrology  [address] [place] [address]  Phone: [phone]  Fax: [phone]  Follow Up Time: 2 weeks

## 2022-09-02 NOTE — CONSULT NOTE ADULT - ASSESSMENT
Patient is 76-year-old female with PMH of Afib on Xarelto, s/p pacemaker, mitral valve repair, hypertension, hyperlipidemia, overactive bladder presenting presents for "brain fog" and lightheadedness similar to symptoms that she presented with last admission 1 week prior. 1 week ago she was admitted for hyponatremia secondary to polydypsia and poor solute intake treated with fluid restriction and IVF. patient complains of same symptoms and found to have Na level of 127 on admission, denies treating excessive water. Nephro eval for hyponatremia.    Assessment and plan  Hyponatremia/ Afib on xarelto/ HTN  Hyponatremia resolved with no treatment, patient did not received IVF  urine osm 487  possible hyponatremia secondary to poor solute intake  counselled about using more salt in her diet  TSH within normal on last admissin  low AG, check UPEP SPEP FLC IF  check uric acid levels  can be discharged from nephro stand point of view  follow up with nephrology as OP Patient is 76-year-old female with PMH of Afib on Xarelto, s/p pacemaker, mitral valve repair, hypertension, hyperlipidemia, overactive bladder presenting presents for "brain fog" and lightheadedness similar to symptoms that she presented with last admission 1 week prior. 1 week ago she was admitted for hyponatremia secondary to polydypsia and poor solute intake treated with fluid restriction and IVF. patient complains of same symptoms and found to have Na level of 127 on admission, denies treating excessive water. Nephro eval for hyponatremia.    Assessment and plan  Hyponatremia/ Afib on xarelto/ HTN  Hyponatremia resolved with no treatment, patient did not received IVF  urine osm 487  possible hyponatremia secondary to poor solute intake  counseled about using more salt in her diet  TSH within normal on last admissin  low AG, check UPEP SPEP FLC IF  check uric acid levels  can be discharged from nephro stand point of view  follow up with nephrology as OP

## 2022-09-02 NOTE — DISCHARGE NOTE PROVIDER - HOSPITAL COURSE
Patient is 76-year-old female with PMH of Afib on Xarelto, s/p pacemaker, mitral valve repair, hypertension, hyperlipidemia, overactive bladder presenting presented 9/1/22 for "brain fog" and lightheadedness similar to symptoms that she presented with last admission 1 week prior but less severe. On previous admission (8/26-8/27/22) patient had excessive water consumption after having severe thirst, found to have Na 122, with low serum osmolarity and low urine osmolarity. Patient was started on NS for 10 hours and then Na normalized to 140 and sxs resolved w/o complication. Patient was discharged and encouraged adequate oral intake.   At this admission pt again reported excessive thirst, however had not had excessive water intake this time as she was instructed not to. Patient's sodium was mildly low (127) with normal serum osmolarity and high urine osmolarity. Hyponatremia corrected during hospital stay. CT head scan revealed no acute intracranial hemorrhage, mass-effect or midline shift. New small low-attenuation area within the right cerebellum (compared to CT head 2/26/2022) suspicious for infarct. She was loaded with aspirin and    Patient is 76-year-old female with PMH of Afib on Xarelto, s/p pacemaker, mitral valve repair, hypertension, hyperlipidemia, overactive bladder presenting presented 9/1/22 for "brain fog" and lightheadedness similar to symptoms that she presented with last admission 1 week prior but less severe. On previous admission (8/26-8/27/22) patient had excessive water consumption after having severe thirst, found to have Na 122, with low serum osmolarity and low urine osmolarity. Patient was started on NS for 10 hours and then Na normalized to 140 and sxs resolved w/o complication. Patient was discharged and encouraged adequate oral intake.   At this admission pt again reported excessive thirst, however had not had excessive water intake this time as she was instructed not to. Patient's sodium was mildly low (127) with normal serum osmolarity and high urine osmolarity. Hyponatremia corrected during hospital stay. Nephrology was consulted and recommended outpatient studies and follow up. CT head scan revealed no acute intracranial hemorrhage, mass-effect or midline shift. New small low-attenuation area within the right cerebellum (compared to CT head 2/26/2022) suspicious for infarct. She was loaded with aspirin and started on high dose statin. Neurology followed and further imaging was not necessary at this time.

## 2022-09-02 NOTE — OCCUPATIONAL THERAPY INITIAL EVALUATION ADULT - GENERAL OBSERVATIONS, REHAB EVAL
Pt received semi rios in bed in NAD, agreeable to OT evaluation, left seated at edge of bed (in ED) RN aware

## 2022-09-02 NOTE — CHART NOTE - NSCHARTNOTEFT_GEN_A_CORE
Device Type: pacemaker   Pacemaker/ICD : [employer].   Model: Accolade. Serial Number: [id number]. Date of Implant: 1/29/18.

## 2022-09-02 NOTE — H&P ADULT - HISTORY OF PRESENT ILLNESS
Patient is 76-year-old female with PMH of Afib on Xarelto, s/p pacemaker, mitral valve repair, hypertension, hyperlipidemia, overactive bladder presenting presents for dizziness and "brain fog" similar to symptoms that she presented with last admission 1 week prior. At that time patient had excessive water consumption after having severe thirst, found to have Na 122. Patient was started on NS for 10 hours and then Na normalized to 140 and sxs resolved w/o complication. Patient was discharged and encouraged adequate oral intake. Today again reports excessive thirst and water intake. Patient denies headache, change in vision, fevers, chest pain, palpitations, diarrhea, constipation, dysuria, polyuria, numbness.    Vitals were stable in the ED. Labs were significant for Na 128. Serum osm 283, urine osm 487.   CTH revealed no acute intracranial hemorrhage, mass-effect or midline shift. New small low-attenuation area within the right cerebellum compared to CT head 2/26/2022 suspicious for infarct. Correlation with MRI brain  may be obtained.  ED team discussed with neurology attending, stated team would follow up in AM. Will most likely need MRI.      Patient is 76-year-old female with PMH of Afib on Xarelto, s/p pacemaker, mitral valve repair, hypertension, hyperlipidemia, overactive bladder presenting presents for "brain fog" and lightheadedness similar to symptoms that she presented with last admission 1 week prior. However, she says the symptoms are not as bad as last time. At that time patient had excessive water consumption after having severe thirst, found to have Na 122. Patient was started on NS for 10 hours and then Na normalized to 140 and sxs resolved w/o complication. Patient was discharged and encouraged adequate oral intake. Today again reports excessive thirst. Has not had excessive water intake this time as she was instructed not to. Patient denies headache, dizziness, change in vision, fevers, chest pain, palpitations, diarrhea, constipation, dysuria, polyuria, numbness.    Vitals were stable in the ED. Labs were significant for Na 128. Serum osm 283, urine osm 487.   CTH revealed no acute intracranial hemorrhage, mass-effect or midline shift. New small low-attenuation area within the right cerebellum compared to CT head 2/26/2022 suspicious for infarct. Correlation with MRI brain  may be obtained.  ED team discussed with neurology attending, stated team would follow up in AM. Will most likely need MRI.

## 2022-09-02 NOTE — CONSULT NOTE ADULT - SUBJECTIVE AND OBJECTIVE BOX
NEPHROLOGY CONSULTATION NOTE    Patient is 76-year-old female with PMH of Afib on Xarelto, s/p pacemaker, mitral valve repair, hypertension, hyperlipidemia, overactive bladder presenting presents for "brain fog" and lightheadedness similar to symptoms that she presented with last admission 1 week prior. However, she says the symptoms are not as bad as last time. At that time patient had excessive water consumption after having severe thirst, found to have Na 122. Patient was started on NS for 10 hours and then Na normalized to 140 and sxs resolved w/o complication. Patient was discharged and encouraged adequate oral intake. Today again reports excessive thirst. Has not had excessive water intake this time as she was instructed not to. Patient denies headache, dizziness, change in vision, fevers, chest pain, palpitations, diarrhea, constipation, dysuria, polyuria, numbness.    PAST MEDICAL & SURGICAL HISTORY:  History of atrial fibrillation      H/O mitral valve repair      Hypertension      Hyperlipidemia      Overactive bladder      Cardiac pacemaker      H/O mitral valve repair        Allergies:  fluconazole (Hives)  sulfa drugs (Unknown)    Home Medications Reviewed  Hospital Medications:   MEDICATIONS  (STANDING):  atorvastatin 80 milliGRAM(s) Oral at bedtime  famotidine    Tablet 20 milliGRAM(s) Oral daily  flecainide 50 milliGRAM(s) Oral every 12 hours  metoprolol tartrate 50 milliGRAM(s) Oral three times a day  oxybutynin 5 milliGRAM(s) Oral two times a day  rivaroxaban 20 milliGRAM(s) Oral with dinner      REVIEW OF SYSTEMS:  CONSTITUTIONAL: No weakness, fevers or chills  EYES/ENT: No visual changes;  No vertigo or throat pain   NECK: No pain or stiffness  RESPIRATORY: No cough, wheezing, hemoptysis; No shortness of breath  CARDIOVASCULAR: No chest pain or palpitations.  GASTROINTESTINAL: No abdominal or epigastric pain. No nausea, vomiting, or hematemesis; No diarrhea or constipation. No melena or hematochezia., decrease appetite  GENITOURINARY: No dysuria, frequency, foamy urine, urinary urgency, incontinence or hematuria  NEUROLOGICAL: inability to concentrate- resolved  SKIN: No itching, burning, rashes, or lesions   VASCULAR: No bilateral lower extremity edema.   All other review of systems is negative unless indicated above.    VITALS:  Vital Signs Last 24 Hrs  T(C): 36.6 (02 Sep 2022 08:47), Max: 37.1 (01 Sep 2022 19:41)  T(F): 97.8 (02 Sep 2022 08:47), Max: 98.8 (01 Sep 2022 19:41)  HR: 80 (02 Sep 2022 08:47) (60 - 80)  BP: 147/74 (02 Sep 2022 08:47) (130/60 - 177/74)  BP(mean): --  RR: 18 (02 Sep 2022 08:47) (18 - 18)  SpO2: 98% (02 Sep 2022 08:47) (97% - 99%)    Parameters below as of 02 Sep 2022 08:47  Patient On (Oxygen Delivery Method): room air        Height (cm): 165.1 ( @ 19:41)  Weight (kg): 61.2 ( @ 19:41)  BMI (kg/m2): 22.5 ( @ 19:41)  BSA (m2): 1.67 ( @ 19:41)  PHYSICAL EXAM:  Constitutional: NAD  HEENT: anicteric sclera, oropharynx clear, MMM  Neck: No JVD  Respiratory: CTAB, no wheezes, rales or rhonchi  Cardiovascular: S1, S2, RRR  Gastrointestinal: BS+, soft, NT/ND  Extremities: No cyanosis or clubbing. No peripheral edema  Neurological: A/O x 3, no focal deficits  Psychiatric: Normal mood, normal affect  : No CVA tenderness. No ty.   Skin: No rashes    LABS:      136  |  103  |  19  ----------------------------<  90  4.3   |  27  |  1.0    Ca    9.2      02 Sep 2022 05:05  Mg     2.2         TPro  6.8  /  Alb  4.5  /  TBili  0.7  /  DBili      /  AST  25  /  ALT  27  /  AlkPhos  75      Creatinine Trend: 1.0 <--, 1.1 <--, 1.0 <--, 1.0 <--, 1.2 <--                        12.0   7.61  )-----------( 214      ( 01 Sep 2022 21:19 )             35.3     Urine Studies:  Urinalysis Basic - ( 01 Sep 2022 23:40 )    Color: Colorless / Appearance: Clear / S.007 / pH:   Gluc:  / Ketone: Negative  / Bili: Negative / Urobili: <2 mg/dL   Blood:  / Protein: Negative / Nitrite: Negative   Leuk Esterase: Moderate / RBC: 0 /HPF / WBC 3 /HPF   Sq Epi:  / Non Sq Epi: 1 /HPF / Bacteria: Negative      Osmolality, Random Urine: 487 mos/kg ( @ 01:03)  Osmolality, Random Urine: 138 mos/kg ( @ 16:13)  Sodium, Random Urine: <20.0 mmoL/L ( @ 16:13)  Potassium, Random Urine: 9 mmol/L ( @ 16:13)                   NEPHROLOGY CONSULTATION NOTE    Patient is 76-year-old female with PMH of Afib on Xarelto, s/p pacemaker, mitral valve repair, hypertension, hyperlipidemia, overactive bladder presenting presents for "brain fog" and lightheadedness similar to symptoms that she presented with last admission 1 week prior. However, she says the symptoms are not as bad as last time. At that time patient had excessive water consumption after having severe thirst, found to have Na 122. Patient was started on NS for 10 hours and then Na normalized to 140 and sxs resolved w/o complication. Patient was discharged and encouraged adequate oral intake. Today again reports excessive thirst. Has not had excessive water intake this time as she was instructed not to. Patient denies headache, dizziness, change in vision, fevers, chest pain, palpitations, diarrhea, constipation, dysuria, polyuria, numbness.    PAST MEDICAL & SURGICAL HISTORY:    History of atrial fibrillation  H/O mitral valve repair  Hypertension  Hyperlipidemia  Overactive bladder  Cardiac pacemaker  H/O mitral valve repair        Allergies:  fluconazole (Hives)  sulfa drugs (Unknown)    Home Medications Reviewed  Hospital Medications:   MEDICATIONS  (STANDING):  atorvastatin 80 milliGRAM(s) Oral at bedtime  famotidine    Tablet 20 milliGRAM(s) Oral daily  flecainide 50 milliGRAM(s) Oral every 12 hours  metoprolol tartrate 50 milliGRAM(s) Oral three times a day  oxybutynin 5 milliGRAM(s) Oral two times a day  rivaroxaban 20 milliGRAM(s) Oral with dinner      REVIEW OF SYSTEMS:  CONSTITUTIONAL: No weakness, fevers or chills  EYES/ENT: No visual changes;  No vertigo or throat pain   NECK: No pain or stiffness  RESPIRATORY: No cough, wheezing, hemoptysis; No shortness of breath  CARDIOVASCULAR: No chest pain or palpitations.  GASTROINTESTINAL: No abdominal or epigastric pain. No nausea, vomiting, or hematemesis; No diarrhea or constipation. No melena or hematochezia., decrease appetite  GENITOURINARY: No dysuria, frequency, foamy urine, urinary urgency, incontinence or hematuria  NEUROLOGICAL: inability to concentrate- resolved  SKIN: No itching, burning, rashes, or lesions   VASCULAR: No bilateral lower extremity edema.   All other review of systems is negative unless indicated above.    VITALS:  Vital Signs Last 24 Hrs  T(C): 36.6 (02 Sep 2022 08:47), Max: 37.1 (01 Sep 2022 19:41)  T(F): 97.8 (02 Sep 2022 08:47), Max: 98.8 (01 Sep 2022 19:41)  HR: 80 (02 Sep 2022 08:47) (60 - 80)  BP: 147/74 (02 Sep 2022 08:47) (130/60 - 177/74)  BP(mean): --  RR: 18 (02 Sep 2022 08:47) (18 - 18)  SpO2: 98% (02 Sep 2022 08:47) (97% - 99%)    Parameters below as of 02 Sep 2022 08:47  Patient On (Oxygen Delivery Method): room air        Height (cm): 165.1 ( @ 19:41)  Weight (kg): 61.2 ( @ 19:41)  BMI (kg/m2): 22.5 ( @ 19:41)  BSA (m2): 1.67 ( @ 19:41)  PHYSICAL EXAM:  Constitutional: NAD  HEENT: anicteric sclera, oropharynx clear, MMM  Neck: No JVD  Respiratory: CTAB, no wheezes, rales or rhonchi  Cardiovascular: S1, S2, RRR  Gastrointestinal: BS+, soft, NT/ND  Extremities: No cyanosis or clubbing. No peripheral edema  Neurological: A/O x 3, no focal deficits  Psychiatric: Normal mood, normal affect  : No CVA tenderness. No ty.   Skin: No rashes    LABS:      136  |  103  |  19  ----------------------------<  90  4.3   |  27  |  1.0    Ca    9.2      02 Sep 2022 05:05  Mg     2.2         TPro  6.8  /  Alb  4.5  /  TBili  0.7  /  DBili      /  AST  25  /  ALT  27  /  AlkPhos  75      Creatinine Trend: 1.0 <--, 1.1 <--, 1.0 <--, 1.0 <--, 1.2 <--                        12.0   7.61  )-----------( 214      ( 01 Sep 2022 21:19 )             35.3     Urine Studies:  Urinalysis Basic - ( 01 Sep 2022 23:40 )    Color: Colorless / Appearance: Clear / S.007 / pH:   Gluc:  / Ketone: Negative  / Bili: Negative / Urobili: <2 mg/dL   Blood:  / Protein: Negative / Nitrite: Negative   Leuk Esterase: Moderate / RBC: 0 /HPF / WBC 3 /HPF   Sq Epi:  / Non Sq Epi: 1 /HPF / Bacteria: Negative      Osmolality, Random Urine: 487 mos/kg ( @ 01:03)  Osmolality, Random Urine: 138 mos/kg ( @ 16:13)  Sodium, Random Urine: <20.0 mmoL/L ( @ 16:13)  Potassium, Random Urine: 9 mmol/L ( @ 16:13)

## 2022-09-03 LAB
CULTURE RESULTS: SIGNIFICANT CHANGE UP
SPECIMEN SOURCE: SIGNIFICANT CHANGE UP

## 2022-09-23 ENCOUNTER — TRANSCRIPTION ENCOUNTER (OUTPATIENT)
Age: 77
End: 2022-09-23

## 2022-09-23 ENCOUNTER — INPATIENT (INPATIENT)
Facility: HOSPITAL | Age: 77
LOS: 3 days | Discharge: HOME HEALTH | End: 2022-09-27
Attending: STUDENT IN AN ORGANIZED HEALTH CARE EDUCATION/TRAINING PROGRAM | Admitting: STUDENT IN AN ORGANIZED HEALTH CARE EDUCATION/TRAINING PROGRAM

## 2022-09-23 VITALS
SYSTOLIC BLOOD PRESSURE: 186 MMHG | HEART RATE: 81 BPM | DIASTOLIC BLOOD PRESSURE: 81 MMHG | TEMPERATURE: 98 F | RESPIRATION RATE: 16 BRPM | OXYGEN SATURATION: 98 %

## 2022-09-23 DIAGNOSIS — Z95.0 PRESENCE OF CARDIAC PACEMAKER: Chronic | ICD-10-CM

## 2022-09-23 DIAGNOSIS — Z98.890 OTHER SPECIFIED POSTPROCEDURAL STATES: Chronic | ICD-10-CM

## 2022-09-23 LAB
ALBUMIN SERPL ELPH-MCNC: 4.3 G/DL — SIGNIFICANT CHANGE UP (ref 3.5–5.2)
ALP SERPL-CCNC: 71 U/L — SIGNIFICANT CHANGE UP (ref 30–115)
ALT FLD-CCNC: 18 U/L — SIGNIFICANT CHANGE UP (ref 0–41)
ANION GAP SERPL CALC-SCNC: 11 MMOL/L — SIGNIFICANT CHANGE UP (ref 7–14)
APTT BLD: 28.7 SEC — SIGNIFICANT CHANGE UP (ref 27–39.2)
AST SERPL-CCNC: 23 U/L — SIGNIFICANT CHANGE UP (ref 0–41)
BASOPHILS # BLD AUTO: 0.09 K/UL — SIGNIFICANT CHANGE UP (ref 0–0.2)
BASOPHILS NFR BLD AUTO: 1.1 % — HIGH (ref 0–1)
BILIRUB SERPL-MCNC: 0.5 MG/DL — SIGNIFICANT CHANGE UP (ref 0.2–1.2)
BUN SERPL-MCNC: 28 MG/DL — HIGH (ref 10–20)
CALCIUM SERPL-MCNC: 9.6 MG/DL — SIGNIFICANT CHANGE UP (ref 8.4–10.5)
CHLORIDE SERPL-SCNC: 100 MMOL/L — SIGNIFICANT CHANGE UP (ref 98–110)
CO2 SERPL-SCNC: 23 MMOL/L — SIGNIFICANT CHANGE UP (ref 17–32)
CREAT SERPL-MCNC: 1.1 MG/DL — SIGNIFICANT CHANGE UP (ref 0.7–1.5)
EGFR: 52 ML/MIN/1.73M2 — LOW
EOSINOPHIL # BLD AUTO: 0.08 K/UL — SIGNIFICANT CHANGE UP (ref 0–0.7)
EOSINOPHIL NFR BLD AUTO: 1 % — SIGNIFICANT CHANGE UP (ref 0–8)
GLUCOSE SERPL-MCNC: 105 MG/DL — HIGH (ref 70–99)
HCT VFR BLD CALC: 34.8 % — LOW (ref 37–47)
HGB BLD-MCNC: 11.6 G/DL — LOW (ref 12–16)
IMM GRANULOCYTES NFR BLD AUTO: 0.5 % — HIGH (ref 0.1–0.3)
INR BLD: 1.11 RATIO — SIGNIFICANT CHANGE UP (ref 0.65–1.3)
LYMPHOCYTES # BLD AUTO: 1.67 K/UL — SIGNIFICANT CHANGE UP (ref 1.2–3.4)
LYMPHOCYTES # BLD AUTO: 20 % — LOW (ref 20.5–51.1)
MCHC RBC-ENTMCNC: 31.8 PG — HIGH (ref 27–31)
MCHC RBC-ENTMCNC: 33.3 G/DL — SIGNIFICANT CHANGE UP (ref 32–37)
MCV RBC AUTO: 95.3 FL — SIGNIFICANT CHANGE UP (ref 81–99)
MONOCYTES # BLD AUTO: 0.58 K/UL — SIGNIFICANT CHANGE UP (ref 0.1–0.6)
MONOCYTES NFR BLD AUTO: 6.9 % — SIGNIFICANT CHANGE UP (ref 1.7–9.3)
NEUTROPHILS # BLD AUTO: 5.89 K/UL — SIGNIFICANT CHANGE UP (ref 1.4–6.5)
NEUTROPHILS NFR BLD AUTO: 70.5 % — SIGNIFICANT CHANGE UP (ref 42.2–75.2)
NRBC # BLD: 0 /100 WBCS — SIGNIFICANT CHANGE UP (ref 0–0)
PLATELET # BLD AUTO: 187 K/UL — SIGNIFICANT CHANGE UP (ref 130–400)
POTASSIUM SERPL-MCNC: 4.3 MMOL/L — SIGNIFICANT CHANGE UP (ref 3.5–5)
POTASSIUM SERPL-SCNC: 4.3 MMOL/L — SIGNIFICANT CHANGE UP (ref 3.5–5)
PROT SERPL-MCNC: 6.6 G/DL — SIGNIFICANT CHANGE UP (ref 6–8)
PROTHROM AB SERPL-ACNC: 12.8 SEC — SIGNIFICANT CHANGE UP (ref 9.95–12.87)
RBC # BLD: 3.65 M/UL — LOW (ref 4.2–5.4)
RBC # FLD: 15 % — HIGH (ref 11.5–14.5)
SARS-COV-2 RNA SPEC QL NAA+PROBE: SIGNIFICANT CHANGE UP
SODIUM SERPL-SCNC: 134 MMOL/L — LOW (ref 135–146)
TROPONIN T SERPL-MCNC: <0.01 NG/ML — SIGNIFICANT CHANGE UP
WBC # BLD: 8.35 K/UL — SIGNIFICANT CHANGE UP (ref 4.8–10.8)
WBC # FLD AUTO: 8.35 K/UL — SIGNIFICANT CHANGE UP (ref 4.8–10.8)

## 2022-09-23 PROCEDURE — 99291 CRITICAL CARE FIRST HOUR: CPT | Mod: GC

## 2022-09-23 PROCEDURE — 71045 X-RAY EXAM CHEST 1 VIEW: CPT | Mod: 26

## 2022-09-23 PROCEDURE — 99283 EMERGENCY DEPT VISIT LOW MDM: CPT

## 2022-09-23 PROCEDURE — 93010 ELECTROCARDIOGRAM REPORT: CPT

## 2022-09-23 PROCEDURE — 70496 CT ANGIOGRAPHY HEAD: CPT | Mod: 26,MA

## 2022-09-23 PROCEDURE — 0042T: CPT | Mod: MA

## 2022-09-23 PROCEDURE — 70498 CT ANGIOGRAPHY NECK: CPT | Mod: 26,MA

## 2022-09-23 RX ORDER — LANOLIN ALCOHOL/MO/W.PET/CERES
3 CREAM (GRAM) TOPICAL AT BEDTIME
Refills: 0 | Status: DISCONTINUED | OUTPATIENT
Start: 2022-09-23 | End: 2022-09-27

## 2022-09-23 RX ORDER — ONDANSETRON 8 MG/1
4 TABLET, FILM COATED ORAL EVERY 8 HOURS
Refills: 0 | Status: DISCONTINUED | OUTPATIENT
Start: 2022-09-23 | End: 2022-09-27

## 2022-09-23 RX ORDER — SODIUM CHLORIDE 9 MG/ML
1000 INJECTION INTRAMUSCULAR; INTRAVENOUS; SUBCUTANEOUS
Refills: 0 | Status: DISCONTINUED | OUTPATIENT
Start: 2022-09-23 | End: 2022-09-24

## 2022-09-23 RX ORDER — ATORVASTATIN CALCIUM 80 MG/1
20 TABLET, FILM COATED ORAL AT BEDTIME
Refills: 0 | Status: DISCONTINUED | OUTPATIENT
Start: 2022-09-23 | End: 2022-09-26

## 2022-09-23 RX ORDER — FLECAINIDE ACETATE 50 MG
50 TABLET ORAL EVERY 12 HOURS
Refills: 0 | Status: DISCONTINUED | OUTPATIENT
Start: 2022-09-23 | End: 2022-09-27

## 2022-09-23 RX ORDER — FAMOTIDINE 10 MG/ML
20 INJECTION INTRAVENOUS DAILY
Refills: 0 | Status: DISCONTINUED | OUTPATIENT
Start: 2022-09-23 | End: 2022-09-27

## 2022-09-23 RX ORDER — RIVAROXABAN 15 MG-20MG
1 KIT ORAL
Qty: 0 | Refills: 0 | DISCHARGE

## 2022-09-23 RX ORDER — FAMOTIDINE 10 MG/ML
1 INJECTION INTRAVENOUS
Qty: 0 | Refills: 0 | DISCHARGE

## 2022-09-23 RX ORDER — PANTOPRAZOLE SODIUM 20 MG/1
40 TABLET, DELAYED RELEASE ORAL
Refills: 0 | Status: DISCONTINUED | OUTPATIENT
Start: 2022-09-23 | End: 2022-09-25

## 2022-09-23 RX ORDER — RIVAROXABAN 15 MG-20MG
20 KIT ORAL
Refills: 0 | Status: DISCONTINUED | OUTPATIENT
Start: 2022-09-23 | End: 2022-09-27

## 2022-09-23 RX ORDER — METOPROLOL TARTRATE 50 MG
50 TABLET ORAL THREE TIMES A DAY
Refills: 0 | Status: DISCONTINUED | OUTPATIENT
Start: 2022-09-23 | End: 2022-09-24

## 2022-09-23 RX ORDER — FLECAINIDE ACETATE 50 MG
1 TABLET ORAL
Qty: 0 | Refills: 0 | DISCHARGE

## 2022-09-23 RX ORDER — CHOLECALCIFEROL (VITAMIN D3) 125 MCG
1 CAPSULE ORAL
Qty: 0 | Refills: 0 | DISCHARGE

## 2022-09-23 RX ORDER — ACETAMINOPHEN 500 MG
650 TABLET ORAL EVERY 6 HOURS
Refills: 0 | Status: DISCONTINUED | OUTPATIENT
Start: 2022-09-23 | End: 2022-09-27

## 2022-09-23 RX ORDER — SODIUM CHLORIDE 9 MG/ML
1000 INJECTION INTRAMUSCULAR; INTRAVENOUS; SUBCUTANEOUS ONCE
Refills: 0 | Status: COMPLETED | OUTPATIENT
Start: 2022-09-23 | End: 2022-09-23

## 2022-09-23 RX ORDER — METOPROLOL TARTRATE 50 MG
1 TABLET ORAL
Qty: 0 | Refills: 0 | DISCHARGE

## 2022-09-23 RX ADMIN — RIVAROXABAN 20 MILLIGRAM(S): KIT at 18:34

## 2022-09-23 RX ADMIN — ATORVASTATIN CALCIUM 20 MILLIGRAM(S): 80 TABLET, FILM COATED ORAL at 23:00

## 2022-09-23 RX ADMIN — SODIUM CHLORIDE 1000 MILLILITER(S): 9 INJECTION INTRAMUSCULAR; INTRAVENOUS; SUBCUTANEOUS at 17:15

## 2022-09-23 RX ADMIN — FAMOTIDINE 20 MILLIGRAM(S): 10 INJECTION INTRAVENOUS at 23:00

## 2022-09-23 NOTE — PATIENT PROFILE ADULT - FALL HARM RISK - PATIENT NEEDS ASSISTANCE
OLI SPOKE TO PATIENT
Picked up Rx  For Vitamin D only received #4 pills for one month with no refills, will send another Rx  To pharmacy  Recommend Cipro for UTI sx  Bid for one week 
Standing/Walking/Toileting

## 2022-09-23 NOTE — DISCHARGE NOTE NURSING/CASE MANAGEMENT/SOCIAL WORK - PATIENT PORTAL LINK FT
You can access the FollowMyHealth Patient Portal offered by [facility] by registering at the following website: [email or web address]. By joining [employer]’s FollowMyHealth portal, you will also be able to view your health information using other applications (apps) compatible with our system.

## 2022-09-23 NOTE — ED ADULT NURSE REASSESSMENT NOTE - NS ED NURSE REASSESS COMMENT FT1
pt being transferred lights and sirens to the [facility]. Report was given via phone to charge rn mary kay, and to crit iona perez via teams. Pt family at bedside aware of transfer. [facility] here to transport pt.

## 2022-09-23 NOTE — ED PROVIDER NOTE - NSICDXPASTSURGICALHX_GEN_ALL_CORE_FT
PAST SURGICAL HISTORY:  Cardiac pacemaker     H/O mitral valve repair     S/P ablation of atrial fibrillation 2x (2011 and 2012)

## 2022-09-23 NOTE — ED PROVIDER NOTE - OBJECTIVE STATEMENT
76-year-old female history of A. fib on Xarelto, hypertension, hyperlipidemia, pacemaker placement with a recent admission for hyponatremia was brought in by EMS after she left her house and told her neighbor that she did not feel well.  Neighbor endorses that the patient was confused.  Son-in-law came with patient states that last night is when he last spoke with her and she was at her baseline.  Patient unable to provide history due to AMS.  Stroke code called on arrival.  Patient emergently taken to CAT scan.  Patient ANO x1, moving all extremities spontaneously without motor deficits.

## 2022-09-23 NOTE — CONSULT NOTE ADULT - NS ATTEND AMEND GEN_ALL_CORE FT
Patient examined in ER and stroke score had decreased to 0.  She was feeling back to normal and daughter was at bedside.  She had missed last night's dose of Xarelto (had dropped it on floor).  Discussed importance of not missing medication doses.  I watched patient pass a bedside swallow test so she will be given her home Xarelto dose of 20 mg to take.  Will complete stroke testing including MRI brain.  Vascular imaging negative for LVO.

## 2022-09-23 NOTE — H&P ADULT - HISTORY OF PRESENT ILLNESS
76Y female with PMHx of Afib on Xarelto, HTN, HLD, PPM, recent admission for hyponatremia BIBEMS for the above chief complaint. history goes back to when she told her neighbor she was not feeling well. LKW was 10 PM as per daughter who talked to her over the phone. Patient lives alone. Patient did not take her Xarelto dose and her last dose was 9/21/22. In [facility] ED, she was not able to provide any history and was noted to be aphasic. NIH of 6. [facility] ED discussed with Dr Garcia at time of stroke code.  Patient was transferred to [facility] for stroke workup.     VITALS:   T(C): 36.4 (09-23-22 @ 18:03), Max: 36.6 (09-23-22 @ 16:14)  HR: 60 (09-23-22 @ 18:12) (60 - 81)  BP: 189/88 (09-23-22 @ 18:12) (169/73 - 189/88)  RR: 16 (09-23-22 @ 18:12) (16 - 16)  SpO2: 99% (09-23-22 @ 18:12) (97% - 99%)      CTH-negative for acute findings. CTA head/neck-Focal severe stenosis of right distal ICA supraclinoid segment. Focal mild/moderate stenosis at left distal ICA supraclinoid segment. CTP-Small 32 mL perfusion abnormality in the left parietal region corresponding to occlusion of left P4 segment (3-317). Patient's NIH improved from NIH 5--->2--->0. Patient is not a tpa candidate as LKW was yesterday at 10 PM. Patient is not an IA candidate as no LVO.STAT dysphagia screen was done. STAT Home Xarelto 20 mg was ordered in ED.    76Y female with PMHx of Afib on Xarelto, HTN, HLD, PPM, minimally invasive mitral valve repair (2003) recent admission for hyponatremia BIBEMS for the above chief complaint. history goes back to around 2:30 pm after running errands when patient mentioned feeling "funny". she saw her neighbors pulling in and she told them that she was not feeling well. neighbors spoke to daughter who spoke to her and noticed that she was confused -> daughter called her  who went to see her. by that time neighbors already called the ambulance who brought the patient to the [facility] ED where she was aphasic with a NHSS of 6. to note thatPatient lives alone. Patient did not take her Xarelto dose and her last dose was 9/21/22. [facility] ED discussed with Dr Garcia at time of stroke code. -> Patient was transferred to [facility] for stroke workup.     VITALS:   T(C): 36.4 (09-23-22 @ 18:03), Max: 36.6 (09-23-22 @ 16:14)  HR: 60 (09-23-22 @ 18:12) (60 - 81)  BP: 189/88 (09-23-22 @ 18:12) (169/73 - 189/88)  RR: 16 (09-23-22 @ 18:12) (16 - 16)  SpO2: 99% (09-23-22 @ 18:12) (97% - 99%)      in the ED, vitals were significant for HTN. labs were unremarkable. neurology consulted and stroke workup initiated. CTH-negative for acute findings. CTA head/neck-Focal severe stenosis of right distal ICA supraclinoid segment. Focal mild/moderate stenosis at left distal ICA supraclinoid segment. CTP-Small 32 mL perfusion abnormality in the left parietal region corresponding to occlusion of left P4 segment (3-317). Patient's NIH improved from NIH 5--->2--->0. Patient is not a tpa candidate as LKW was yesterday at 10 PM. Patient is not an IA candidate as no LVO. admitted to stroke unit for observation    76Y female with PMHx of Afib on Xarelto, HTN, HLD, PPM, minimally invasive mitral valve repair (2003) recent admission for hyponatremia BIBEMS for the above chief complaint. history goes back to around 2:30 pm after running errands when patient mentioned feeling "funny". she saw her neighbors pulling in and she told them that she was not feeling well. neighbors spoke to daughter who spoke to her and noticed that she was confused -> daughter called her  who went to see her. by that time neighbors already called the ambulance who brought the patient to the [facility] ED where she was aphasic with a NHSS of 6. to note thatPatient lives alone. Patient did not take her Xarelto dose and her last dose was 9/21/22. [facility] ED discussed with Dr Garcia at time of stroke code. -> Patient was transferred to [facility] for stroke workup.     VITALS:   T(C): 36.4 (09-23-22 @ 18:03), Max: 36.6 (09-23-22 @ 16:14)  HR: 60 (09-23-22 @ 18:12) (60 - 81)  BP: 189/88 (09-23-22 @ 18:12) (169/73 - 189/88)  RR: 16 (09-23-22 @ 18:12) (16 - 16)  SpO2: 99% (09-23-22 @ 18:12) (97% - 99%)      in the ED, vitals were significant for HTN. labs were unremarkable. neurology consulted and stroke workup initiated. CTH-negative for acute findings. CTA head/neck-Focal severe stenosis of right distal ICA supraclinoid segment. Focal mild/moderate stenosis at left distal ICA supraclinoid segment. CTP-Small 32 mL perfusion abnormality in the left parietal region corresponding to occlusion of left P4 segment (3-317). Patient's NIH improved from NIH 5--->2--->0. Patient is not a tpa or IA candidate (as no LVO). admitted to stroke unit for observation

## 2022-09-23 NOTE — CONSULT NOTE ADULT - SUBJECTIVE AND OBJECTIVE BOX
Stroke CONSULT Note:    CURTIS [facility]ATERS    1. Chief Complaint:    HPI: 76Y female with PMHx of Afib on Xarelto, HTN, HLD, PPM, recent admission for hyponatremia BIBEMS after she told her neighbor she was not feeling well. LKW was 10 PM as per daughter who talked to her over the phone. Patient lives alone. Patient did not take her Xarelto dose and her last dose was 22. In [facility] ED, she was not able to provide any history and was noted to be aphasic. NIH of 6. Patient was transferred to [facility] for stroke workup.       2. Relevant PMH:   Prior ischemic stroke/TIA[ ], Afib [ ], CAD [ ], HTN [ ], DLD [ ], DM [ ], PVD [ ], Obesity [ ],   Sedentary lifestyle [ ], CHF [ ], AVERY [ ], Cancer Hx [ ].    3. Social History: Smoking [ ], Drug Use [ ], Alcohol Use [ ], Other [ ]    4. Possible Location of Stroke:    5. Relevant Brain Tissue Imaging:  < from: CT Brain Stroke Protocol (22 @ 16:25) >  IMPRESSION:  No CT evidence of large acute territorial infarct.    No evidence of acute intracranial pathology. No mass effect, midline   shift or intracranial hemorrhage.    Mild chronic microvascular type changes as well as a chronic right   cerebellar infarct.    These findings were discussed with Dr. BAILEE GILMORE [phone] at   2022 4:32 PM by Dr. Rose with read back confirmation.    < end of copied text >    6. Relevant Cerebrovascular Imaging:    < from: CT Angio Neck Stroke Protocol w/ IV Cont (22 @ 16:49) >  IMPRESSION:    Small 32 mL perfusion abnormality in the left parietal region   corresponding to occlusion of left P4 segment (3-317).    No intracranial small aneurysm.    Focal severe stenosis of right distal ICA supraclinoid segment. Focal   mild/moderate stenosis at left distal ICA supraclinoid segment.    Dr. Dandre Hernandez discussed preliminary findings with LIGIA CRUZ DO   on 2022 5:38 PM with readback.    < end of copied text >    7. Relevant blood tests:                          11.6   8.35  )-----------( 187      ( 23 Sep 2022 17:09 )             34.8       134<L>  |  100  |  28<H>  ----------------------------<  105<H>  4.3   |  23  |  1.1    Ca    9.6      23 Sep 2022 17:09    TPro  6.6  /  Alb  4.3  /  TBili  0.5  /  DBili  x   /  AST  23  /  ALT  18  /  AlkPhos  71    PT/INR - ( 23 Sep 2022 17:09 )   PT: 12.80 sec;   INR: 1.11 ratio         PTT - ( 23 Sep 2022 17:09 )  PTT:28.7 sec  8. Relevant cardiac rhythm monitorin. Relevant Cardiac Structure: (TTE/JAXSON +/-):[ ]No intracardiac thrombus/[ ] no vegetation/[ ]no akynesia/EF:    Home Medications:  famotidine 20 mg oral tablet: 1 tab(s) orally once a day (26 Aug 2022 10:16)  flecainide 50 mg oral tablet: 1 tab(s) orally every 12 hours (26 Aug 2022 10:14)  Metoprolol Tartrate 50 mg oral tablet: 1 tab(s) orally 3 times a day (26 Aug 2022 10:13)  rosuvastatin 5 mg oral tablet: 1 tab(s) orally once a day (26 Aug 2022 10:15)  Vitamin D3 125 mcg (5000 intl units) oral tablet: 1 tab(s) orally once a day (26 Aug 2022 10:15)  Xarelto 20 mg oral tablet: 1 tab(s) orally once a day (in the evening) (26 Aug 2022 10:16)      MEDICATIONS  (STANDING):  rivaroxaban 20 milliGRAM(s) Oral with dinner      10. PT/OT/Speech/Rehab/S&Sw/ Cognitive eval results and recommendations:    11. Exam:    Vital Signs Last 24 Hrs  T(C): 36.6 (23 Sep 2022 16:17), Max: 36.6 (23 Sep 2022 16:14)  T(F): 97.8 (23 Sep 2022 16:17), Max: 97.8 (23 Sep 2022 16:14)  HR: 60 (23 Sep 2022 18:12) (60 - 81)  BP: 189/88 (23 Sep 2022 18:12) (169/73 - 189/88)  BP(mean): --  RR: 16 (23 Sep 2022 18:12) (16 - 16)  SpO2: 99% (23 Sep 2022 18:12) (97% - 99%)    Parameters below as of 23 Sep 2022 18:12  Patient On (Oxygen Delivery Method): room air        12.   NIH STROKE SCALE  Item	                                                        Score  1 a.	Level of Consciousness	               	0  1 b. LOC Questions	                                1-->0  1 c.	LOC Commands	                               	0  2.	Best Gaze	                                        0  3.	Visual	                                                0  4.	Facial Palsy	                                        0  5 a.	Motor Arm - Left	                                0  5 b.	Motor Arm - Right	                        0  6 a.	Motor Leg - Left	                                0  6 b.	Motor Leg - Right	                                0  7.	Limb Ataxia	                                        0  8.	Sensory	                                                0  9.	Language	                                        0  10.	Dysarthria	                                        1-->0  11.	Extinction and Inattention  	        0  ______________________________________  TOTAL	                                                        0    Total NIHSS on admission:      NIHSS yesterday:      NIHSS today:0

## 2022-09-23 NOTE — ED PROVIDER NOTE - ATTENDING CONTRIBUTION TO CARE
76 y.o. F, PMH of simón on Xarelto, HTN, HLD, PPM, recent admission for hyponatremia, BIBA after she came out of the house and told her neighbor she does not feel well. Pt is confused. As per son-in-law, he last spoke to the pt last night. She seemed at baseline. Pt is unable to provide any history. On exam, pt in NAD, AAOx0, head NC/AT, CN II-XII intact, PEERL, EOMi, neck (-) midline tenderness, lungs CTA B/L, CV S1S2 regular, abdomen soft/NT/ND/(+)BS, ext (-) edema, motor 5/5x4. Code stroke called on arrival. Pt taken to CT. Will do labs, CT, UA and reevaluate.

## 2022-09-23 NOTE — H&P ADULT - ASSESSMENT
76Y female with PMHx of Afib on Xarelto, HTN, HLD, PPM, recent admission for hyponatremia BIBEMS for AMS. patient was first in [facility] ED -> NIHSS 6 -> transferred to [facility] -> imaging done -> Small 32 mL perfusion abnormality in the left parietal region corresponding to occlusion of left P4 segment but NIHSS was 0 and patient not a candidate for TPA or NI. admitted to stroke unit    #TIA  - no sepsis POA  - LKW 10 pm  - first in [facility] ED -> NIHSS 6 -> transferred to [place] ED  - CTH -> negative for acute findings.   - CTA head/neck -> Focal severe stenosis of right distal ICA supraclinoid segment. Focal mild/moderate stenosis at left distal ICA supraclinoid segment.   - CTP-Small 32 mL perfusion abnormality in the left parietal region corresponding to occlusion of left P4 segment   - NIHSS improved to 0  - not a candidate for tpa or IA  - q2 neurochecks  - Permissive HTN up to 220/120 -> BP goal 180-220  - Resume home Xarelto tomorrow  - MRI brain non contrast   - TTE   - a1c, lipid panel  - PT/OT/ST/rehab eval       #Chronic Afib  - resume Xarelto tomorrow  - c/w flecainide, metoprolol tartrate 50 mg TID  - Cardiologist is Dr. Ng    #Hs  of pacemaker   - [employer])    # Hx mitral valve repair    #hyperlipidemia  - on rosuvastatin at home, c/w atorvastatin while in patient    #MISC  DVT PPX  GI PPX  DIET  CODE  DISPO Stroke unit     76Y female with PMHx of Afib on Xarelto, HTN, HLD, PPM, recent admission for hyponatremia BIBEMS for AMS. patient was first in [facility] ED -> NIHSS 6 -> transferred to [place] ED -> imaging done -> Small 32 mL perfusion abnormality in the left parietal region corresponding to occlusion of left P4 segment but NIHSS was 0 and patient not a candidate for TPA or NI. admitted to stroke unit    #TIA  - no sepsis POA  - LKW 10 pm  - first in [facility] ED -> NIHSS 6 -> transferred to [place] ED  - CTH -> negative for acute findings.   - CTA head/neck -> Focal severe stenosis of right distal ICA supraclinoid segment. Focal mild/moderate stenosis at left distal ICA supraclinoid segment.   - CTP-Small 32 mL perfusion abnormality in the left parietal region corresponding to occlusion of left P4 segment   - NIHSS improved to 0  - not a candidate for tpa or IA  - q2 neurochecks  - Permissive HTN up to 220/120 -> BP goal 180-220  - Resume home Xarelto tomorrow  - MRI brain non contrast   - TTE   - a1c, lipid panel  - PT/OT/ST/rehab eval       #Chronic Afib  - s/p ablation 2x (2011 and 2012)  - resume Xarelto tomorrow  - c/w flecainide, metoprolol tartrate 50 mg TID  - Cardiologist is Dr. Ng    #Hs  of pacemaker   - [employer])    # Hx minimally invasive mitral valve repair  - in 2003    #hyperlipidemia  - on rosuvastatin at home, c/w atorvastatin while in patient    #MISC  DVT PPX xarelto  GI PPX Protonix  DIET soft and bite sized until S&S eval  CODE FULL   DISPO Stroke unit     76Y female with PMHx of Afib on Xarelto, HTN, HLD, PPM, recent admission for hyponatremia BIBEMS for AMS. patient was first in [facility] ED -> NIHSS 6 -> transferred to [place] ED -> imaging done -> Small 32 mL perfusion abnormality in the left parietal region corresponding to occlusion of left P4 segment but NIHSS was 0 and patient not a candidate for TPA or NI. admitted to stroke unit    #suspicion of TIA  - no sepsis POA  - LKW 10 pm as per daughter who talked to her over the phone.  - first in [facility] ED -> NIHSS 6 -> transferred to [place] ED  - CTH -> negative for acute findings.   - CTA head/neck -> Focal severe stenosis of right distal ICA supraclinoid segment. Focal mild/moderate stenosis at left distal ICA supraclinoid segment.   - CTP-Small 32 mL perfusion abnormality in the left parietal region corresponding to occlusion of left P4 segment   - NIHSS improved to 0  - not a candidate for tpa or IA  - q2 neurochecks  - Permissive HTN up to 220/120 -> BP goal 180-220  - Resume home Xarelto tomorrow  - MRI brain non contrast   - TTE   - a1c, lipid panel  - PT/OT/ST/rehab eval       #Chronic Afib  - s/p ablation 2x (2011 and 2012)  - resume Xarelto tomorrow  - c/w flecainide, metoprolol tartrate 50 mg TID  - Cardiologist is Dr. Ng    #Hs  of pacemaker   - [employer])    # Hx minimally invasive mitral valve repair  - in 2003    #hyperlipidemia  - on rosuvastatin at home, c/w atorvastatin while in patient    #MISC  DVT PPX Xarelto  GI PPX Protonix  DIET soft and bite sized until S&S eval  CODE FULL   DISPO Stroke unit

## 2022-09-23 NOTE — ED PROVIDER NOTE - PROGRESS NOTE DETAILS
Pt's mental status is improving. Now AAOx2. Able to carry on a conversation. As per telestroke MD, Dr. Garcia, pt to be transferred to [place]. David: pt arrived at [facility]. Neuro called, at bedside. David: Neuro at bedside. Pt now AOx4, following all commands. Neuro exam nonfocal.  Per neurology, admit to stroke unit. endorsed to AUGUSTUS Moreno

## 2022-09-23 NOTE — DISCHARGE NOTE NURSING/CASE MANAGEMENT/SOCIAL WORK - NSDCPEFALRISK_GEN_ALL_CORE
For information on Fall & Injury Prevention, visit: [email or web address] OR  [email or web address] OR  https://www.cdc.gov/steadi/patient.html

## 2022-09-23 NOTE — CONSULT NOTE ADULT - ASSESSMENT
Assessment: 76Y female with PMHx of Afib on Xarelto, HTN, HLD, PPM, recent admission for hyponatremia BIBEMS after she told her neighbor she was not feeling well. LKW was 10 PM as per daughter who talked to her over the phone. Patient lives alone. Patient did not take her Xarelto dose and her last dose was 9/21/22. In [facility] ED, she was not able to provide any history and was noted to be aphasic. NIH of 6. [facility] ED discussed with Dr Garcia at time of stroke code.  Patient was transferred to [facility] for stroke workup. CTH-negative for acute findings. CTA head/neck-Focal severe stenosis of right distal ICA supraclinoid segment. Focal mild/moderate stenosis at left distal ICA supraclinoid segment. CTP-Small 32 mL perfusion abnormality in the left parietal region corresponding to occlusion of left P4 segment (3-317). Patient's NIH improved from NIH 5--->2--->0. Patient is not a tpa candidate as LKW was yesterday at 10 PM. Patient is not an IA candidate as no LVO.STAT dysphagia screen was done. STAT Home Xarelto 20 mg was ordered in ED.    Suggestions:  Admit to stroke unit under Dr. garcia  q2 neurochecks, vital signs  Permissive HTN up to 220/120  BP goal 180-220  Can start patient on 75cc/hr   Avoid hypotension, hypovolemia   Resume home Xarelto tomorrow  MRI brain non contrast   TTE   a1c, lipid panel  PT/OT/ST/rehab eval   Seen and discussed with Dr. Ramirez. Pending attending note to follow  Assessment: 76Y female with PMHx of Afib on Xarelto, HTN, HLD, PPM, recent admission for hyponatremia BIBEMS after she told her neighbor she was not feeling well. LKW was 10 PM as per daughter who talked to her over the phone. Patient lives alone. Patient did not take her Xarelto dose and her last dose was 9/21/22. In [facility] ED, she was not able to provide any history and was noted to be aphasic. NIH of 6. [facility] ED discussed with Dr Garcia at time of stroke code.  Patient was transferred to [facility] for stroke workup. CTH-negative for acute findings. CTA head/neck-Focal severe stenosis of right distal ICA supraclinoid segment. Focal mild/moderate stenosis at left distal ICA supraclinoid segment. CTP-Small 32 mL perfusion abnormality in the left parietal region corresponding to occlusion of left P4 segment (3-317). Patient's NIH improved from NIH 5--->2--->0. Patient is not a tpa candidate as LKW was yesterday at 10 PM. Patient is not an IA candidate as no LVO.STAT dysphagia screen was done. STAT Home Xarelto 20 mg was ordered in ED.    Suggestions:  Admit to stroke unit under Dr. garcia  q2 neurochecks, vital signs  Permissive HTN up to 220/120  BP goal 180-220  Can start patient on 75cc/hr   Avoid hypotension, hypovolemia   Resume home Xarelto   MRI brain non contrast   TTE   a1c, lipid panel  PT/OT/ST/rehab eval   Seen and discussed with Dr. Ramirez. Pending attending note to follow

## 2022-09-23 NOTE — H&P ADULT - NSHPLABSRESULTS_GEN_ALL_CORE
LABS:                          11.6   8.35  )-----------( 187      ( 23 Sep 2022 17:09 )             34.8     09-23    134<L>  |  100  |  28<H>  ----------------------------<  105<H>  4.3   |  23  |  1.1    Ca    9.6      23 Sep 2022 17:09    TPro  6.6  /  Alb  4.3  /  TBili  0.5  /  DBili  x   /  AST  23  /  ALT  18  /  AlkPhos  71  09-23    LIVER FUNCTIONS - ( 23 Sep 2022 17:09 )  Alb: 4.3 g/dL / Pro: 6.6 g/dL / ALK PHOS: 71 U/L / ALT: 18 U/L / AST: 23 U/L / GGT: x           PT/INR - ( 23 Sep 2022 17:09 )   PT: 12.80 sec;   INR: 1.11 ratio         PTT - ( 23 Sep 2022 17:09 )  PTT:28.7 sec

## 2022-09-23 NOTE — H&P ADULT - NSHPPHYSICALEXAM_GEN_ALL_CORE
GENERAL: NAD, lying in bed comfortably  HEAD:  Atraumatic, Normocephalic  EYES: EOMI, PERRLA, conjunctiva and sclera clear  ENT: Moist mucous membranes  NECK: Supple, No JVD  CHEST/LUNG: Clear to auscultation bilaterally; No rales, rhonchi, wheezing, or rubs. Unlabored respirations  HEART: Regular rate and rhythm; No murmurs, rubs, or gallops  ABDOMEN: BSx4; Soft, nontender, nondistended  EXTREMITIES:  2+ Peripheral Pulses, brisk capillary refill. No clubbing, cyanosis, or edema  NERVOUS SYSTEM:  A&Ox3, no focal deficits   SKIN: No rashes or lesions GENERAL: NAD, lying in bed comfortably  HEAD:  Atraumatic, Normocephalic  EYES: EOMI, PERRLA, conjunctiva and sclera clear  NECK: Supple, No JVD  CHEST/LUNG: Clear to auscultation bilaterally; No rales, rhonchi, wheezing, or rubs. Unlabored respirations  HEART: Regular rate and rhythm; No murmurs, rubs, or gallops  ABDOMEN: BSx4; Soft, nontender, nondistended  EXTREMITIES:  2+ Peripheral Pulses, brisk capillary refill. No clubbing, cyanosis, or edema  NERVOUS SYSTEM:  A&Ox3, no focal deficits

## 2022-09-23 NOTE — ED PROVIDER NOTE - NSICDXPASTMEDICALHX_GEN_ALL_CORE_FT
PAST MEDICAL HISTORY:  H/O mitral valve repair 2003    History of atrial fibrillation     Hyperlipidemia     Hypertension     Overactive bladder

## 2022-09-23 NOTE — ED PROVIDER NOTE - PHYSICAL EXAMINATION
CONSTITUTIONAL: Well-appearing; well-nourished; in no apparent distress.   HEAD: Normocephalic; atraumatic.   EYES: PERRL; EOM intact. Conjunctiva normal B/L.   ENT: Normal pharynx with no tonsillar hypertrophy. MMM.  NECK: Supple; non-tender; no cervical lymphadenopathy.   CHEST: Normal chest excursion with respiration.   CARDIOVASCULAR: Normal S1, S2; no murmurs, rubs, or gallops.   RESPIRATORY: Normal chest excursion with respiration; breath sounds clear and equal bilaterally; no wheezes, rhonchi, or rales.  GI/: Normal bowel sounds; non-distended; non-tender.  BACK: No evidence of trauma or deformity. Non-tender to palpation. No CVA tenderness.   EXT: Normal ROM in all four extremities; non-tender to palpation; distal pulses are normal. No leg edema B/L.   SKIN: Normal for age and race; warm; dry; good turgor.  NEURO: A & O x 1; CN 2-12 intact. Grossly unremarkable. moving all extremities spontaneously; no motor deficits, no drift, no sensation deficits, no facial droop, no dysarthria. NIH 4

## 2022-09-23 NOTE — ED ADULT NURSE NOTE - PATIENT IS UNABLE TO BE SCREENED DUE TO:
46 year old female patient presents today for a follow up from her colonoscopy on (2022). She admits/denies any complications after her procedure. She currently reports -- bowel movements per --, with/without strain. Her stools are -- and --. She admits/denies any mucus, melena or blood in stools. Admits/Denies any pruritus ani or rectal pain.   Colonoscopy report shows: - Internal hemorrhoids - No specimens collected    Last visit (2022): 46 y/o female patient presents today for a colorectal cancer screening consultation. Patient admits this will be her first colonoscopy. She admits a family history of colon polyps with Mother and Maternal Aunt  from Pancreatic Cancer.   Currently, reports 1-2  bowel movements per day without strain. Her stools are formed.  Denies melena, blood, or mucus in stools, rectal pain or pruritus ani. Acuity of illness done

## 2022-09-24 LAB
A1C WITH ESTIMATED AVERAGE GLUCOSE RESULT: 5.6 % — SIGNIFICANT CHANGE UP (ref 4–5.6)
ALBUMIN SERPL ELPH-MCNC: 3.8 G/DL — SIGNIFICANT CHANGE UP (ref 3.5–5.2)
ALP SERPL-CCNC: 64 U/L — SIGNIFICANT CHANGE UP (ref 30–115)
ALT FLD-CCNC: 22 U/L — SIGNIFICANT CHANGE UP (ref 0–41)
ANION GAP SERPL CALC-SCNC: 12 MMOL/L — SIGNIFICANT CHANGE UP (ref 7–14)
AST SERPL-CCNC: 26 U/L — SIGNIFICANT CHANGE UP (ref 0–41)
BASOPHILS # BLD AUTO: 0.05 K/UL — SIGNIFICANT CHANGE UP (ref 0–0.2)
BASOPHILS NFR BLD AUTO: 0.6 % — SIGNIFICANT CHANGE UP (ref 0–1)
BILIRUB SERPL-MCNC: 0.6 MG/DL — SIGNIFICANT CHANGE UP (ref 0.2–1.2)
BUN SERPL-MCNC: 17 MG/DL — SIGNIFICANT CHANGE UP (ref 10–20)
CALCIUM SERPL-MCNC: 8.9 MG/DL — SIGNIFICANT CHANGE UP (ref 8.4–10.5)
CHLORIDE SERPL-SCNC: 105 MMOL/L — SIGNIFICANT CHANGE UP (ref 98–110)
CHOLEST SERPL-MCNC: 119 MG/DL — SIGNIFICANT CHANGE UP
CO2 SERPL-SCNC: 21 MMOL/L — SIGNIFICANT CHANGE UP (ref 17–32)
CREAT SERPL-MCNC: 0.9 MG/DL — SIGNIFICANT CHANGE UP (ref 0.7–1.5)
EGFR: 66 ML/MIN/1.73M2 — SIGNIFICANT CHANGE UP
EOSINOPHIL # BLD AUTO: 0.12 K/UL — SIGNIFICANT CHANGE UP (ref 0–0.7)
EOSINOPHIL NFR BLD AUTO: 1.5 % — SIGNIFICANT CHANGE UP (ref 0–8)
ESTIMATED AVERAGE GLUCOSE: 114 MG/DL — SIGNIFICANT CHANGE UP (ref 68–114)
GLUCOSE SERPL-MCNC: 86 MG/DL — SIGNIFICANT CHANGE UP (ref 70–99)
HCT VFR BLD CALC: 33.5 % — LOW (ref 37–47)
HDLC SERPL-MCNC: 51 MG/DL — SIGNIFICANT CHANGE UP
HGB BLD-MCNC: 11.5 G/DL — LOW (ref 12–16)
IMM GRANULOCYTES NFR BLD AUTO: 0.1 % — SIGNIFICANT CHANGE UP (ref 0.1–0.3)
LIPID PNL WITH DIRECT LDL SERPL: 46 MG/DL — SIGNIFICANT CHANGE UP
LYMPHOCYTES # BLD AUTO: 2.01 K/UL — SIGNIFICANT CHANGE UP (ref 1.2–3.4)
LYMPHOCYTES # BLD AUTO: 24.9 % — SIGNIFICANT CHANGE UP (ref 20.5–51.1)
MAGNESIUM SERPL-MCNC: 2 MG/DL — SIGNIFICANT CHANGE UP (ref 1.8–2.4)
MCHC RBC-ENTMCNC: 32.7 PG — HIGH (ref 27–31)
MCHC RBC-ENTMCNC: 34.3 G/DL — SIGNIFICANT CHANGE UP (ref 32–37)
MCV RBC AUTO: 95.2 FL — SIGNIFICANT CHANGE UP (ref 81–99)
MONOCYTES # BLD AUTO: 0.56 K/UL — SIGNIFICANT CHANGE UP (ref 0.1–0.6)
MONOCYTES NFR BLD AUTO: 6.9 % — SIGNIFICANT CHANGE UP (ref 1.7–9.3)
NEUTROPHILS # BLD AUTO: 5.32 K/UL — SIGNIFICANT CHANGE UP (ref 1.4–6.5)
NEUTROPHILS NFR BLD AUTO: 66 % — SIGNIFICANT CHANGE UP (ref 42.2–75.2)
NON HDL CHOLESTEROL: 68 MG/DL — SIGNIFICANT CHANGE UP
NRBC # BLD: 0 /100 WBCS — SIGNIFICANT CHANGE UP (ref 0–0)
PLATELET # BLD AUTO: 175 K/UL — SIGNIFICANT CHANGE UP (ref 130–400)
POTASSIUM SERPL-MCNC: 4.3 MMOL/L — SIGNIFICANT CHANGE UP (ref 3.5–5)
POTASSIUM SERPL-SCNC: 4.3 MMOL/L — SIGNIFICANT CHANGE UP (ref 3.5–5)
PROT SERPL-MCNC: 5.9 G/DL — LOW (ref 6–8)
RBC # BLD: 3.52 M/UL — LOW (ref 4.2–5.4)
RBC # FLD: 15.3 % — HIGH (ref 11.5–14.5)
SODIUM SERPL-SCNC: 138 MMOL/L — SIGNIFICANT CHANGE UP (ref 135–146)
TRIGL SERPL-MCNC: 111 MG/DL — SIGNIFICANT CHANGE UP
WBC # BLD: 8.07 K/UL — SIGNIFICANT CHANGE UP (ref 4.8–10.8)
WBC # FLD AUTO: 8.07 K/UL — SIGNIFICANT CHANGE UP (ref 4.8–10.8)

## 2022-09-24 PROCEDURE — 99233 SBSQ HOSP IP/OBS HIGH 50: CPT

## 2022-09-24 PROCEDURE — 93306 TTE W/DOPPLER COMPLETE: CPT | Mod: 26

## 2022-09-24 PROCEDURE — 99223 1ST HOSP IP/OBS HIGH 75: CPT

## 2022-09-24 RX ORDER — SODIUM CHLORIDE 9 MG/ML
1000 INJECTION INTRAMUSCULAR; INTRAVENOUS; SUBCUTANEOUS
Refills: 0 | Status: DISCONTINUED | OUTPATIENT
Start: 2022-09-24 | End: 2022-09-27

## 2022-09-24 RX ORDER — METOPROLOL TARTRATE 50 MG
50 TABLET ORAL EVERY 8 HOURS
Refills: 0 | Status: DISCONTINUED | OUTPATIENT
Start: 2022-09-24 | End: 2022-09-27

## 2022-09-24 RX ORDER — SENNA PLUS 8.6 MG/1
2 TABLET ORAL AT BEDTIME
Refills: 0 | Status: DISCONTINUED | OUTPATIENT
Start: 2022-09-24 | End: 2022-09-27

## 2022-09-24 RX ADMIN — Medication 50 MILLIGRAM(S): at 05:42

## 2022-09-24 RX ADMIN — Medication 50 MILLIGRAM(S): at 17:18

## 2022-09-24 RX ADMIN — Medication 50 MILLIGRAM(S): at 00:27

## 2022-09-24 RX ADMIN — Medication 50 MILLIGRAM(S): at 14:02

## 2022-09-24 RX ADMIN — ATORVASTATIN CALCIUM 20 MILLIGRAM(S): 80 TABLET, FILM COATED ORAL at 21:05

## 2022-09-24 RX ADMIN — SENNA PLUS 2 TABLET(S): 8.6 TABLET ORAL at 21:05

## 2022-09-24 RX ADMIN — PANTOPRAZOLE SODIUM 40 MILLIGRAM(S): 20 TABLET, DELAYED RELEASE ORAL at 06:45

## 2022-09-24 RX ADMIN — SODIUM CHLORIDE 44 MILLILITER(S): 9 INJECTION INTRAMUSCULAR; INTRAVENOUS; SUBCUTANEOUS at 18:38

## 2022-09-24 RX ADMIN — RIVAROXABAN 20 MILLIGRAM(S): KIT at 17:20

## 2022-09-24 RX ADMIN — Medication 50 MILLIGRAM(S): at 21:05

## 2022-09-24 RX ADMIN — SODIUM CHLORIDE 100 MILLILITER(S): 9 INJECTION INTRAMUSCULAR; INTRAVENOUS; SUBCUTANEOUS at 01:31

## 2022-09-24 RX ADMIN — SODIUM CHLORIDE 100 MILLILITER(S): 9 INJECTION INTRAMUSCULAR; INTRAVENOUS; SUBCUTANEOUS at 08:45

## 2022-09-24 RX ADMIN — FAMOTIDINE 20 MILLIGRAM(S): 10 INJECTION INTRAVENOUS at 11:07

## 2022-09-24 NOTE — PHYSICAL THERAPY INITIAL EVALUATION ADULT - PERTINENT HX OF CURRENT PROBLEM, REHAB EVAL
76Y female with PMHx of Afib on Xarelto, HTN, HLD, PPM, recent admission for hyponatremia BIBEMS for AMS. patient was first in [facility] ED -> NIHSS 6 -> transferred to [place] ED -> imaging done -> Small 32 mL perfusion abnormality in the left parietal region corresponding to occlusion of left P4 segment but NIHSS was 0 and patient not a candidate for TPA or NI. admitted to stroke unit.

## 2022-09-24 NOTE — CONSULT NOTE ADULT - ASSESSMENT
76Y female with PMHx of Afib on Xarelto, HTN, HLD, PPM, recent admission for hyponatremia BIBEMS for AMS. Patient was first in [facility] ED -> NIHSS 6 for aphasia and confusion-> transferred to [place] ED -> imaging done -> Small 32 mL perfusion abnormality in the left parietal region corresponding to occlusion of left P4 segment but NIHSS was 0 and patient not a candidate for TPA or NI. admitted to stroke unit for further work up and evaluations:     #Acute ischemic stroke vs TIA, Pt with Hx of Afib on Xarelto, missed one dose the night before:   #Intracranial stenosis  - LKW 10 pm as per daughter who talked to her over the phone.  - first in [facility] ED -> NIHSS 6 -> transferred to [place] ED  - CTH -> negative for acute findings.   - CTA head/neck -> Focal severe stenosis of right distal ICA supraclinoid segment. Focal mild/moderate stenosis at left distal ICA supraclinoid segment.   - CTP-Small 32 mL perfusion abnormality in the left parietal region corresponding to occlusion of left P4 segment   - NIHSS improved to 0  - not a candidate for tpa or IA  - q2 neurochecks  - Permissive HTN SBP goal 160 - 200, w ICA stenosis.    - Resume Xarelto   - MRI brain non contrast, Pt has pacemaker, copy of the card in the chart.   - TTE with bubble pending.   - a1c 5.6  - LDL 46, C/w home   - PT/OT/ST/rehab eval   - PT eval, outpatient PT.   - NI consulted regarding CTA findings.     #Chronic Afib  - s/p ablation 2x (2011 and 2012)  - resume Xarelto as per neuro  - c/w flecainide, metoprolol tartrate 50 mg TID (can give lower dose temporarily  if needed for permissive HTN)  - Cardiologist is Dr. Ng    #Hs  of pacemaker   - [employer]), copy of the card is in the chart.     # Hx minimally invasive mitral valve repair  - in 2003    #hyperlipidemia, LDL 46  - on rosuvastatin at home, c/w atorvastatin while in patient    #MISC  DVT PPX Xarelto  GI PPX Protonix  DIET soft and bite sized until S&S eval  CODE FULL     #Progress Note Handoff  Pending: Consults____Clinical improvement and stability__x___Tests__MRI, TTE______PT____x____  Pt/Family discussion: Pt informed and agrees with the current plan  Disposition: Home___?___/SNF_______/4A______/To be determined____x____    My note supersedes the residents note should a discrepancy arise.    Chart and notes personally reviewed.  Care Discussed with Consultants/Other Providers/ Housestaff [ x] YES [ ] NO   Radiology, labs, old records personally reviewed.    discussed w/ housestaff, nursing, case management, neuro team   76Y female with PMHx of Afib on Xarelto, HTN, HLD, PPM, recent admission for hyponatremia BIBEMS for AMS. Patient was first in [facility] ED -> NIHSS 6 for aphasia and confusion-> transferred to [place] ED -> imaging done -> Small 32 mL perfusion abnormality in the left parietal region corresponding to occlusion of left P4 segment but NIHSS was 0 and patient not a candidate for TPA or NI. admitted to stroke unit for further work up and evaluations:     #Acute ischemic stroke vs TIA, Pt with Hx of Afib on Xarelto, missed one dose the night before:   #Intracranial stenosis  - LKW 10 pm as per daughter who talked to her over the phone.  - first in [facility] ED -> NIHSS 6 -> transferred to [place] ED  - CTH -> negative for acute findings.   - CTA head/neck -> Focal severe stenosis of right distal ICA supraclinoid segment. Focal mild/moderate stenosis at left distal ICA supraclinoid segment.   - CTP-Small 32 mL perfusion abnormality in the left parietal region corresponding to occlusion of left P4 segment   - NIHSS improved to 0  - not a candidate for tpa or IA  - q2 neurochecks  - Permissive HTN SBP goal 160 - 200, w ICA stenosis.    - Resume Xarelto   - MRI brain non contrast, Pt has pacemaker, copy of the card in the chart.   - TTE with bubble pending.   - a1c 5.6  - LDL 46, C/w home   - PT/OT/ST/rehab eval   - PT eval, outpatient PT.   - NI consulted regarding CTA findings.     #Chronic Afib  - s/p ablation 2x (2011 and 2012)  - resume Xarelto as per neuro  - c/w flecainide, metoprolol tartrate 50 mg TID (can give lower dose temporarily  if needed for permissive HTN)  - Cardiologist is Dr. Ng    #HTN  permissive HTN for now    #Hs  of pacemaker   - [employer]), copy of the card is in the chart.     # Hx minimally invasive mitral valve repair  - in 2003    #hyperlipidemia, LDL 46  - on rosuvastatin at home, c/w atorvastatin while in patient    #MISC  DVT PPX Xarelto  GI PPX Protonix  DIET soft and bite sized until S&S eval  CODE FULL     #Progress Note Handoff  Pending: Consults____Clinical improvement and stability__x___Tests__MRI, TTE______PT____x____  Pt/Family discussion: Pt informed and agrees with the current plan  Disposition: Home___?___/SNF_______/4A______/To be determined____x____    My note supersedes the residents note should a discrepancy arise.    Chart and notes personally reviewed.  Care Discussed with Consultants/Other Providers/ Housestaff [ x] YES [ ] NO   Radiology, labs, old records personally reviewed.    discussed w/ housestaff, nursing, case management, neuro team

## 2022-09-24 NOTE — CONSULT NOTE ADULT - SUBJECTIVE AND OBJECTIVE BOX
CURTIS WATERS  76y  Female    Patient is a 76y old  Female who presents with a chief complaint of AMS (23 Sep 2022 20:04)      HPI:   76Y female with PMHx of Afib on Xarelto, HTN, HLD, PPM, minimally invasive mitral valve repair (2003) recent admission for hyponatremia BIBEMS for the above chief complaint. history goes back to around 2:30 pm after running errands when patient mentioned feeling "funny". she saw her neighbors pulling in and she told them that she was not feeling well. neighbors spoke to daughter who spoke to her and noticed that she was confused -> daughter called her  who went to see her. by that time neighbors already called the ambulance who brought the patient to the [facility] ED where she was aphasic with a NHSS of 6. to note thatPatient lives alone. Patient did not take her Xarelto dose and her last dose was 9/21/22. [facility] ED discussed with Dr Garcia at time of stroke code. -> Patient was transferred to [facility] for stroke workup.     VITALS:   T(C): 36.4 (09-23-22 @ 18:03), Max: 36.6 (09-23-22 @ 16:14)  HR: 60 (09-23-22 @ 18:12) (60 - 81)  BP: 189/88 (09-23-22 @ 18:12) (169/73 - 189/88)  RR: 16 (09-23-22 @ 18:12) (16 - 16)  SpO2: 99% (09-23-22 @ 18:12) (97% - 99%)      in the ED, vitals were significant for HTN. labs were unremarkable. neurology consulted and stroke workup initiated. CTH-negative for acute findings. CTA head/neck-Focal severe stenosis of right distal ICA supraclinoid segment. Focal mild/moderate stenosis at left distal ICA supraclinoid segment. CTP-Small 32 mL perfusion abnormality in the left parietal region corresponding to occlusion of left P4 segment (3-317). Patient's NIH improved from NIH 5--->2--->0. Patient is not a tpa or IA candidate (as no LVO). admitted to stroke unit for observation   (23 Sep 2022 20:04)    S: Patient was examined and seen at bedside. This morning pt is resting comfortably in bed and reports no new issues or overnight events. No complaints, feels better  Denies CP, SOB, N/V/D/C/AP, cough, F, chills, dizziness, new focal weakness, HA, vision changes, dysuria, or urinary symptoms, blood in stool.  ROS: all other systems reviewed and are negative    PAST MEDICAL & SURGICAL HISTORY:  History of atrial fibrillation      H/O mitral valve repair  2003      Hypertension      Hyperlipidemia      Overactive bladder      Cardiac pacemaker      H/O mitral valve repair      S/P ablation of atrial fibrillation  2x (2011 and 2012)        SOCIAL HISTORY:  Tobacco: denies  Illicit drugs: denies  Alcohol: social  Family history reviewed and otherwise non-contributory No clotting disorders, CVAs at early age.  ALLERGIES: NKDA    MEDICATIONS  (STANDING):  atorvastatin 20 milliGRAM(s) Oral at bedtime  famotidine    Tablet 20 milliGRAM(s) Oral daily  flecainide 50 milliGRAM(s) Oral every 12 hours  metoprolol tartrate 50 milliGRAM(s) Oral three times a day  pantoprazole    Tablet 40 milliGRAM(s) Oral before breakfast  rivaroxaban 20 milliGRAM(s) Oral with dinner  sodium chloride 0.9%. 1000 milliLiter(s) (100 mL/Hr) IV Continuous <Continuous>    MEDICATIONS  (PRN):  acetaminophen     Tablet .. 650 milliGRAM(s) Oral every 6 hours PRN Temp greater or equal to 38C (100.4F), Mild Pain (1 - 3)  aluminum hydroxide/magnesium hydroxide/simethicone Suspension 30 milliLiter(s) Oral every 4 hours PRN Dyspepsia  melatonin 3 milliGRAM(s) Oral at bedtime PRN Insomnia  ondansetron Injectable 4 milliGRAM(s) IV Push every 8 hours PRN Nausea and/or Vomiting    Home Medications:  famotidine 20 mg oral tablet: 1 tab(s) orally once a day (23 Sep 2022 21:00)  flecainide 50 mg oral tablet: 1 tab(s) orally every 12 hours (23 Sep 2022 21:00)  Metoprolol Tartrate 50 mg oral tablet: 1 tab(s) orally 3 times a day (23 Sep 2022 21:00)  rosuvastatin 5 mg oral tablet: 1 tab(s) orally once a day (23 Sep 2022 21:00)  Vitamin D3 125 mcg (5000 intl units) oral tablet: 1 tab(s) orally once a day (23 Sep 2022 21:00)  Xarelto 20 mg oral tablet: 1 tab(s) orally once a day (in the evening) (23 Sep 2022 21:00)          Vital Signs Last 24 Hrs  T(C): 36.6 (24 Sep 2022 08:00), Max: 36.6 (23 Sep 2022 16:14)  T(F): 97.9 (24 Sep 2022 08:00), Max: 97.9 (24 Sep 2022 05:00)  HR: 63 (24 Sep 2022 08:00) (60 - 81)  BP: 193/80 (24 Sep 2022 08:00) (133/55 - 193/80)  BP(mean): 112 (24 Sep 2022 08:00) (74 - 114)  RR: 18 (24 Sep 2022 08:00) (16 - 18)  SpO2: 100% (24 Sep 2022 08:00) (96% - 100%)    Parameters below as of 24 Sep 2022 08:00  Patient On (Oxygen Delivery Method): room air      CAPILLARY BLOOD GLUCOSE      POCT Blood Glucose.: 114 mg/dL (23 Sep 2022 16:12)      General: NAD. Looks stated age.  HEENT: clean oropharynx, EOMI, no LAD  Neck: trachea midline, no thyromegaly  CV: nl S1 S2; no m/r/g  Resp: decreased breath sounds at base  GI: NT/ND/S +BS  MS: no clubbing/cyanosis/edema, +pulses  Neuro: motor, sensory intact; + reflexes  Skin: no rashes, nl turgor  Psychiatric: AA0x3 w/ intact insight and judgement        LABS:                        11.5   8.07  )-----------( 175      ( 24 Sep 2022 06:11 )             33.5     09-24    138  |  105  |  17  ----------------------------<  86  4.3   |  21  |  0.9    Ca    8.9      24 Sep 2022 06:11  Mg     2.0     09-24    TPro  5.9<L>  /  Alb  3.8  /  TBili  0.6  /  DBili  x   /  AST  26  /  ALT  22  /  AlkPhos  64  09-24    LIVER FUNCTIONS - ( 24 Sep 2022 06:11 )  Alb: 3.8 g/dL / Pro: 5.9 g/dL / ALK PHOS: 64 U/L / ALT: 22 U/L / AST: 26 U/L / GGT: x           CARDIAC MARKERS ( 23 Sep 2022 17:09 )  x     / <0.01 ng/mL / x     / x     / x          PT/INR - ( 23 Sep 2022 17:09 )   PT: 12.80 sec;   INR: 1.11 ratio         PTT - ( 23 Sep 2022 17:09 )  PTT:28.7 sec          EKG - SR, nonspecific changes (my read)  Chart and consultant noted personally reviewed.  Care Discussed with Consultants/Other Providers/ Housestaff [ x] YES [ ] NO   Radiology, labs, old records personally reviewed.           CURTIS WATERS  76y  Female    Patient is a 76y old  Female who presents with a chief complaint of AMS (23 Sep 2022 20:04)      HPI:   76Y female with PMHx of Afib on Xarelto, HTN, HLD, PPM, minimally invasive mitral valve repair (2003) recent admission for hyponatremia BIBEMS for the above chief complaint. history goes back to around 2:30 pm after running errands when patient mentioned feeling "funny". she saw her neighbors pulling in and she told them that she was not feeling well. neighbors spoke to daughter who spoke to her and noticed that she was confused -> daughter called her  who went to see her. by that time neighbors already called the ambulance who brought the patient to the [facility] ED where she was aphasic with a NHSS of 6. to note thatPatient lives alone. Patient did not take her Xarelto dose and her last dose was 9/21/22. [facility] ED discussed with Dr Garcia at time of stroke code. -> Patient was transferred to [facility] for stroke workup.     VITALS:   T(C): 36.4 (09-23-22 @ 18:03), Max: 36.6 (09-23-22 @ 16:14)  HR: 60 (09-23-22 @ 18:12) (60 - 81)  BP: 189/88 (09-23-22 @ 18:12) (169/73 - 189/88)  RR: 16 (09-23-22 @ 18:12) (16 - 16)  SpO2: 99% (09-23-22 @ 18:12) (97% - 99%)      in the ED, vitals were significant for HTN. labs were unremarkable. neurology consulted and stroke workup initiated. CTH-negative for acute findings. CTA head/neck-Focal severe stenosis of right distal ICA supraclinoid segment. Focal mild/moderate stenosis at left distal ICA supraclinoid segment. CTP-Small 32 mL perfusion abnormality in the left parietal region corresponding to occlusion of left P4 segment (3-317). Patient's NIH improved from NIH 5--->2--->0. Patient is not a tpa or IA candidate (as no LVO). admitted to stroke unit for observation   (23 Sep 2022 20:04)    Additional Hx: pt also reports RUE weakness yesterday during the episode.    S: Patient was examined and seen at bedside. This morning pt is resting comfortably in bed and reports no new issues or overnight events. No complaints, feels much better (back to baseline)  Denies CP, SOB, N/V/D/C/AP, cough, F, chills, dizziness, new focal weakness, HA, vision changes, dysuria, or urinary symptoms, blood in stool.  ROS: all other systems reviewed and are negative    PAST MEDICAL & SURGICAL HISTORY:  History of atrial fibrillation      H/O mitral valve repair  2003      Hypertension      Hyperlipidemia      Overactive bladder      Cardiac pacemaker      H/O mitral valve repair      S/P ablation of atrial fibrillation  2x (2011 and 2012)        SOCIAL HISTORY:  Tobacco: denies  Illicit drugs: denies  Alcohol: social  Family history reviewed and otherwise non-contributory No clotting disorders, CVAs at early age.  ALLERGIES: NKDA    MEDICATIONS  (STANDING):  atorvastatin 20 milliGRAM(s) Oral at bedtime  famotidine    Tablet 20 milliGRAM(s) Oral daily  flecainide 50 milliGRAM(s) Oral every 12 hours  metoprolol tartrate 50 milliGRAM(s) Oral three times a day  pantoprazole    Tablet 40 milliGRAM(s) Oral before breakfast  rivaroxaban 20 milliGRAM(s) Oral with dinner  sodium chloride 0.9%. 1000 milliLiter(s) (100 mL/Hr) IV Continuous <Continuous>    MEDICATIONS  (PRN):  acetaminophen     Tablet .. 650 milliGRAM(s) Oral every 6 hours PRN Temp greater or equal to 38C (100.4F), Mild Pain (1 - 3)  aluminum hydroxide/magnesium hydroxide/simethicone Suspension 30 milliLiter(s) Oral every 4 hours PRN Dyspepsia  melatonin 3 milliGRAM(s) Oral at bedtime PRN Insomnia  ondansetron Injectable 4 milliGRAM(s) IV Push every 8 hours PRN Nausea and/or Vomiting    Home Medications:  famotidine 20 mg oral tablet: 1 tab(s) orally once a day (23 Sep 2022 21:00)  flecainide 50 mg oral tablet: 1 tab(s) orally every 12 hours (23 Sep 2022 21:00)  Metoprolol Tartrate 50 mg oral tablet: 1 tab(s) orally 3 times a day (23 Sep 2022 21:00)  rosuvastatin 5 mg oral tablet: 1 tab(s) orally once a day (23 Sep 2022 21:00)  Vitamin D3 125 mcg (5000 intl units) oral tablet: 1 tab(s) orally once a day (23 Sep 2022 21:00)  Xarelto 20 mg oral tablet: 1 tab(s) orally once a day (in the evening) (23 Sep 2022 21:00)          Vital Signs Last 24 Hrs  T(C): 36.6 (24 Sep 2022 08:00), Max: 36.6 (23 Sep 2022 16:14)  T(F): 97.9 (24 Sep 2022 08:00), Max: 97.9 (24 Sep 2022 05:00)  HR: 63 (24 Sep 2022 08:00) (60 - 81)  BP: 193/80 (24 Sep 2022 08:00) (133/55 - 193/80)  BP(mean): 112 (24 Sep 2022 08:00) (74 - 114)  RR: 18 (24 Sep 2022 08:00) (16 - 18)  SpO2: 100% (24 Sep 2022 08:00) (96% - 100%)    Parameters below as of 24 Sep 2022 08:00  Patient On (Oxygen Delivery Method): room air      CAPILLARY BLOOD GLUCOSE      POCT Blood Glucose.: 114 mg/dL (23 Sep 2022 16:12)      General: NAD. Looks stated age.  HEENT: clean oropharynx, EOMI, no LAD  Neck: trachea midline, no thyromegaly  CV: nl S1 S2; no m/r/g  Resp: decreased breath sounds at base  GI: NT/ND/S +BS  MS: no clubbing/cyanosis/edema, +pulses  Neuro: motor, sensory intact; + reflexes  Skin: no rashes, nl turgor  Psychiatric: AA0x3 w/ intact insight and judgement    tele: afib, nonspecific changes (on my own evaluation of tele monitor)        LABS:                        11.5   8.07  )-----------( 175      ( 24 Sep 2022 06:11 )             33.5     09-24    138  |  105  |  17  ----------------------------<  86  4.3   |  21  |  0.9    Ca    8.9      24 Sep 2022 06:11  Mg     2.0     09-24    TPro  5.9<L>  /  Alb  3.8  /  TBili  0.6  /  DBili  x   /  AST  26  /  ALT  22  /  AlkPhos  64  09-24    LIVER FUNCTIONS - ( 24 Sep 2022 06:11 )  Alb: 3.8 g/dL / Pro: 5.9 g/dL / ALK PHOS: 64 U/L / ALT: 22 U/L / AST: 26 U/L / GGT: x           CARDIAC MARKERS ( 23 Sep 2022 17:09 )  x     / <0.01 ng/mL / x     / x     / x          PT/INR - ( 23 Sep 2022 17:09 )   PT: 12.80 sec;   INR: 1.11 ratio         PTT - ( 23 Sep 2022 17:09 )  PTT:28.7 sec          EKG - SR, nonspecific changes (my read)  Chart and consultant noted personally reviewed.  Care Discussed with Consultants/Other Providers/ Housestaff [ x] YES [ ] NO   Radiology, labs, old records personally reviewed.

## 2022-09-24 NOTE — OCCUPATIONAL THERAPY INITIAL EVALUATION ADULT - RANGE OF MOTION EXAMINATION
Take Buspar 5mg three times a day.    Thanks for seeing me,  Lisa Vegas PA-C  
L UE trace weaker than right

## 2022-09-24 NOTE — PROGRESS NOTE ADULT - ASSESSMENT
76Y female with PMHx of Afib on Xarelto, HTN, HLD, PPM, recent admission for hyponatremia BIBEMS for AMS. Patient was first in [facility] ED -> NIHSS 6 for aphasia and confusion-> transferred to [facility] ED -> imaging done -> Small 32 mL perfusion abnormality in the left parietal region corresponding to occlusion of left P4 segment but NIHSS was 0 and patient not a candidate for TPA or NI. admitted to stroke unit for further work up and evaluations:     #R/o Acute ischemic stroke vs TIA, Pt with Hx of Afib on Xarelto, missed one dose the night before:   - no sepsis POA  - LKW 10 pm as per daughter who talked to her over the phone.  - first in [facility] ED -> NIHSS 6 -> transferred to [facility] ED  - CTH -> negative for acute findings.   - CTA head/neck -> Focal severe stenosis of right distal ICA supraclinoid segment. Focal mild/moderate stenosis at left distal ICA supraclinoid segment.   - CTP-Small 32 mL perfusion abnormality in the left parietal region corresponding to occlusion of left P4 segment   - NIHSS improved to 0  - not a candidate for tpa or IA  - q2 neurochecks  - Permissive HTN SBP goal 160 - 200, w ICA stenosis.    - Resume Xarelto   - MRI brain non contrast, Pt has pacemaker, copy of the card in the chart.   - TTE with bubble pending.   - a1c 5.6  - LDL 46, C/w home   - PT/OT/ST/rehab eval   - PT eval, outpatient PT.   - NI consulted regarding CTA findings.     #Chronic Afib  - s/p ablation 2x (2011 and 2012)  - resume Xarelto tomorrow  - c/w flecainide, metoprolol tartrate 50 mg TID  - Cardiologist is Dr. Ng    #Hs  of pacemaker   - [employer]), copy of the card is in the chart.     # Hx minimally invasive mitral valve repair  - in 2003    #hyperlipidemia, improved, LDL 46  - on rosuvastatin at home, c/w atorvastatin while in patient    #MISC  DVT PPX Xarelto  GI PPX Protonix  DIET soft and bite sized until S&S eval  CODE FULL   DISPO Stroke unit, active.    76Y female with PMHx of Afib on Xarelto, HTN, HLD, PPM, recent admission for hyponatremia BIBEMS for AMS. Patient was first in [facility] ED -> NIHSS 6 for aphasia and confusion-> transferred to [place] ED -> imaging done -> Small 32 mL perfusion abnormality in the left parietal region corresponding to occlusion of left P4 segment but NIHSS was 0 and patient not a candidate for TPA or NI. admitted to stroke unit for further work up and evaluations:     #R/o Acute ischemic stroke vs TIA, Pt with Hx of Afib on Xarelto, missed one dose the night before:   - no sepsis POA  - LKW 10 pm as per daughter who talked to her over the phone.  - first in [facility] ED -> NIHSS 6 -> transferred to [place] ED  - CTH -> negative for acute findings.   - CTA head/neck -> Focal severe stenosis of right distal ICA supraclinoid segment. Focal mild/moderate stenosis at left distal ICA supraclinoid segment.   - CTP-Small 32 mL perfusion abnormality in the left parietal region corresponding to occlusion of left P4 segment   - NIHSS improved to 0  - not a candidate for tpa or IA  - q2 neurochecks  - Permissive HTN SBP goal 160 - 200, w ICA stenosis.    - Resume Xarelto   - MRI brain non contrast, Pt has pacemaker, copy of the card in the chart.   - TTE with bubble pending.   - a1c 5.6  - LDL 46, C/w home   - PT/OT/ST/rehab eval   - PT eval, outpatient PT.   - NI consulted regarding CTA findings.     #Chronic Afib  - s/p ablation 2x (2011 and 2012)  - resume Xarelto tomorrow  - c/w flecainide, metoprolol tartrate 50 mg TID  - Cardiologist is Dr. Ng    #Hs  of pacemaker   - [employer]), copy of the card is in the chart.     # Hx minimally invasive mitral valve repair  - in 2003    #hyperlipidemia, improved, LDL 46  - on rosuvastatin at home, c/w atorvastatin while in patient    #MISC  DVT PPX Xarelto  GI PPX Protonix  DIET soft and bite sized until S&S eval  CODE FULL   Dispo: Stroke unit, active. Independent per PT, likely home, pending clinical course.    76Y female with PMHx of Afib on Xarelto, HTN, HLD, PPM, recent admission for hyponatremia BIBEMS for AMS. Patient was first in [facility] ED -> NIHSS 6 for aphasia and confusion-> transferred to [place] ED -> imaging done -> Small 32 mL perfusion abnormality in the left parietal region corresponding to occlusion of left P4 segment but NIHSS was 0 and patient not a candidate for TPA or NI. admitted to stroke unit for further work up and evaluations:     #R/o Acute ischemic stroke vs TIA, Pt with Hx of Afib on Xarelto, missed one dose the night before:   - no sepsis POA  - LKW 10 pm as per daughter who talked to her over the phone.  - first in [facility] ED -> NIHSS 6 -> transferred to [place] ED  - CTH -> negative for acute findings.   - CTA head/neck -> Focal severe stenosis of right distal ICA supraclinoid segment. Focal mild/moderate stenosis at left distal ICA supraclinoid segment.   - CTP-Small 32 mL perfusion abnormality in the left parietal region corresponding to occlusion of left P4 segment   - NIHSS improved to 0  - not a candidate for tpa or IA  - q2 neurochecks  - Permissive HTN SBP goal 160 - 200, w ICA stenosis for the next 72 hours.  - Q2 neuro and vital check for the next 48 hours.   - Resume Xarelto   - MRI brain non contrast, Pt has pacemaker, copy of the card in the chart.   - TTE with bubble, performed, reported significant findings, although no PFO.   - a1c 5.6  - LDL 46, C/w home   - PT/OT/ST/rehab eval   - PT eval, outpatient PT.   - NI consulted regarding CTA findings.   - B/l Carotid duplex requested.     #Chronic Afib  - s/p ablation 2x (2011 and 2012)  - resume Xarelto tomorrow  - c/w flecainide, metoprolol tartrate 50 mg TID  - Cardiologist is Dr. Ng    #Hs  of pacemaker   - [employer]), copy of the card is in the chart.     # Hx minimally invasive mitral valve repair  - in 2003    #hyperlipidemia, improved, LDL 46  - on rosuvastatin at home, c/w atorvastatin while in patient    #MISC  DVT PPX Xarelto  GI PPX Protonix  DIET soft and bite sized until S&S eval  CODE FULL   Dispo: Stroke unit, active. Independent per PT, likely home, pending clinical course.    76Y female with PMHx of Afib on Xarelto, HTN, HLD, PPM, recent admission for hyponatremia BIBEMS for AMS. Patient was first in [facility] ED -> NIHSS 6 for aphasia and confusion-> transferred to [place] ED -> imaging done -> Small 32 mL perfusion abnormality in the left parietal region corresponding to occlusion of left P4 segment but NIHSS was 0 and patient not a candidate for TPA or NI. admitted to stroke unit for further work up and evaluations:  -LKW 10 pm as per daughter who talked to her over the phone.  Admission NIHSS 6 -> transferred to [place] ED- NIHSS- 0  -Extracranial carotid stenosis   -TIA      #R/o Acute ischemic stroke vs TIA, Pt with Hx of Afib on Xarelto, missed one dose the night before:   - no sepsis POA  - Stroke Core measures- HGBA1C - 5.6  - On statin  - Augment SBP since exam improved at elevated .  - CTH -> negative for acute findings.   - CTA head/neck -> Focal severe stenosis of right distal ICA supraclinoid segment. Focal mild/moderate stenosis at left distal ICA supraclinoid segment.   - CTP-Small 32 mL perfusion abnormality in the left parietal region corresponding to occlusion of left P4 segment   - not a candidate for tpa or IA  - q2 neurochecks  - Permissive HTN SBP goal 160 - 200, w ICA stenosis for the next 72 hours.  - Hold Metoprolol for SBP < 160   - Q2 neuro and vital check for the next 48 hours.   - Resume Xarelto   - MRI brain non contrast, Pt has pacemaker, copy of the card in the chart.   - TTE with bubble, performed, reported significant findings, although no PFO.   - a1c 5.6  - LDL 46, C/w home mod dose statin  - PT/OT/ST/rehab eval   - S/S evaled and passed  - NI consulted regarding CTA findings.   - B/l Carotid duplex requested.     #Chronic Afib  - MRI compatability for Pacemaker  - Interrogate  PM  - Maintain electrolytes Mg - > 2.0; Phos - > 3.0 and K - > 4.0  - Echo as above  - s/p ablation 2x (2011 and 2012)  - resume Xarelto tomorrow  - c/w flecainide, metoprolol tartrate 50 mg TID  - Cardiologist is Dr. Mastachiulo    #Hs  of pacemaker   - [employer]), copy of the card is in the chart.     # Hx minimally invasive mitral valve repair  - in 2003    #hyperlipidemia, improved, LDL 46  - on rosuvastatin at home, c/w atorvastatin while in patient- mod dose     #MISC  DVT PPX Xarelto  GI PPX Protonix  DIET soft and bite sized until S&S eval  CODE FULL   Dispo: Stroke unit, active. Independent per PT, likely home, pending clinical course.

## 2022-09-24 NOTE — CONSULT NOTE ADULT - SUBJECTIVE AND OBJECTIVE BOX
HPI:   76Y female with PMHx of Afib on Xarelto, HTN, HLD, PPM, minimally invasive mitral valve repair (2003) recent admission for hyponatremia BIBEMS for the above chief complaint. history goes back to around 2:30 pm after running errands when patient mentioned feeling "funny". she saw her neighbors pulling in and she told them that she was not feeling well. neighbors spoke to daughter who spoke to her and noticed that she was confused -> daughter called her  who went to see her. by that time neighbors already called the ambulance who brought the patient to the [facility] ED where she was aphasic with a NHSS of 6. to note thatPatient lives alone. Patient did not take her Xarelto dose and her last dose was 9/21/22. [facility] ED discussed with Dr Garcia at time of stroke code. -> Patient was transferred to [facility] for stroke workup.     VITALS:   T(C): 36.4 (09-23-22 @ 18:03), Max: 36.6 (09-23-22 @ 16:14)  HR: 60 (09-23-22 @ 18:12) (60 - 81)  BP: 189/88 (09-23-22 @ 18:12) (169/73 - 189/88)  RR: 16 (09-23-22 @ 18:12) (16 - 16)  SpO2: 99% (09-23-22 @ 18:12) (97% - 99%)      in the ED, vitals were significant for HTN. labs were unremarkable. neurology consulted and stroke workup initiated. CTH-negative for acute findings. CTA head/neck-Focal severe stenosis of right distal ICA supraclinoid segment. Focal mild/moderate stenosis at left distal ICA supraclinoid segment. CTP-Small 32 mL perfusion abnormality in the left parietal region corresponding to occlusion of left P4 segment (3-317). Patient's NIH improved from NIH 5--->2--->0. Patient is not a tpa or IA candidate (as no LVO). admitted to stroke unit for observation   (23 Sep 2022 20:04)      PAST MEDICAL & SURGICAL HISTORY:  History of atrial fibrillation      H/O mitral valve repair  2003      Hypertension      Hyperlipidemia      Overactive bladder      Cardiac pacemaker      H/O mitral valve repair      S/P ablation of atrial fibrillation  2x (2011 and 2012)          Hospital Course:  Aphasia and left upper extremity weakness resolved in 3 hours.   Speech therapy just evaluated and she is doing wonderfully.  She amb with S without an assistive dev with PT.  TODAY'S SUBJECTIVE & REVIEW OF SYMPTOMS:     Constitutional WNL   Cardio WNL   Resp WNL   GI WNL  Heme WNL  Endo WNL  Skin WNL  MSK WNL  Neuro WNL  Cognitive WNL  Psych WNL      MEDICATIONS  (STANDING):  atorvastatin 20 milliGRAM(s) Oral at bedtime  famotidine    Tablet 20 milliGRAM(s) Oral daily  flecainide 50 milliGRAM(s) Oral every 12 hours  metoprolol tartrate 50 milliGRAM(s) Oral three times a day  pantoprazole    Tablet 40 milliGRAM(s) Oral before breakfast  rivaroxaban 20 milliGRAM(s) Oral with dinner  sodium chloride 0.9%. 1000 milliLiter(s) (100 mL/Hr) IV Continuous <Continuous>    MEDICATIONS  (PRN):  acetaminophen     Tablet .. 650 milliGRAM(s) Oral every 6 hours PRN Temp greater or equal to 38C (100.4F), Mild Pain (1 - 3)  aluminum hydroxide/magnesium hydroxide/simethicone Suspension 30 milliLiter(s) Oral every 4 hours PRN Dyspepsia  melatonin 3 milliGRAM(s) Oral at bedtime PRN Insomnia  ondansetron Injectable 4 milliGRAM(s) IV Push every 8 hours PRN Nausea and/or Vomiting      FAMILY HISTORY:      Allergies    fluconazole (Hives)  sulfa drugs (Unknown)    Intolerances        SOCIAL HISTORY:    [  ] Etoh  [  ] Smoking  [  ] Substance abuse     Home Environment:  [  ] Home Alone  [  ] Lives with Family  [  ] Home Health Aid    Dwelling:  [  ] Apartment  [  ] Private House  [  ] Adult Home  [  ] Skilled Nursing Facility      [  ] Short Term  [  ] Long Term  [  ] Stairs       Elevator [  ]    FUNCTIONAL STATUS PTA: (Check all that apply)  Ambulation: [ x  ]Independent    [  ] Dependent     [  ] Non-Ambulatory  Assistive Device: [  ] SA Cane  [  ]  Q Cane  [  ] Walker  [  ]  Wheelchair  ADL : [ x ] Independent  [  ]  Dependent       Vital Signs Last 24 Hrs  T(C): 36.6 (24 Sep 2022 08:00), Max: 36.6 (23 Sep 2022 16:14)  T(F): 97.9 (24 Sep 2022 08:00), Max: 97.9 (24 Sep 2022 05:00)  HR: 61 (24 Sep 2022 13:00) (60 - 81)  BP: 175/75 (24 Sep 2022 13:00) (133/55 - 193/80)  BP(mean): 108 (24 Sep 2022 13:00) (74 - 114)  RR: 18 (24 Sep 2022 13:00) (16 - 18)  SpO2: 99% (24 Sep 2022 13:00) (96% - 100%)    Parameters below as of 24 Sep 2022 13:00  Patient On (Oxygen Delivery Method): room air          PHYSICAL EXAM: Alert & Oriented X3  GENERAL: NAD, well-groomed, well-developed  HEAD:  Atraumatic, Normocephalic  EYES: EOMI, PERRLA, conjunctiva and sclera clear  NECK: Supple, No JVD, Normal thyroid  CHEST/LUNG: Clear bilaterally; No rales, rhonchi, wheezing, or rubs  HEART: Regular rate and rhythm; No murmurs, rubs, or gallops  ABDOMEN: Soft, Nontender, Nondistended; Bowel sounds present  EXTREMITIES:  2+ Peripheral Pulses, No clubbing, cyanosis, or edema    NERVOUS SYSTEM:  Cranial Nerves 2-12 intact [  ] Abnormal  [  ]  She repeats "No ifs ands or buts about it." nicely.  ROM: WFL all extremities [  ]  Abnormal [  ]  Motor Strength: WFL all extremities  [x ]  Abnormal [  ]  Sensation: intact to light touch [  ] Abnormal [  ]  Reflexes: Symmetric [  ]  Abnormal [  ]    FUNCTIONAL STATUS:  Bed Mobility: Independent [  ]  Supervision [  ]  Needs Assistance [  ]  N/A [  ]  Transfers: Independent [  ]  Supervision [  ]  Needs Assistance [  ]  N/A [  ]   Ambulation: Independent [  ]  Supervision [  ]  Needs Assistance [  ]  N/A [  ]  ADL: Independent [  ] Requires Assistance [  ] N/A [  ]      LABS:                        11.5   8.07  )-----------( 175      ( 24 Sep 2022 06:11 )             33.5     09-24    138  |  105  |  17  ----------------------------<  86  4.3   |  21  |  0.9    Ca    8.9      24 Sep 2022 06:11  Mg     2.0     09-24    TPro  5.9<L>  /  Alb  3.8  /  TBili  0.6  /  DBili  x   /  AST  26  /  ALT  22  /  AlkPhos  64  09-24    PT/INR - ( 23 Sep 2022 17:09 )   PT: 12.80 sec;   INR: 1.11 ratio         PTT - ( 23 Sep 2022 17:09 )  PTT:28.7 sec      RADIOLOGY & ADDITIONAL STUDIES:    Assesment: HPI:   76Y female with PMHx of Afib on Xarelto, HTN, HLD, PPM, minimally invasive mitral valve repair (2003) recent admission for hyponatremia BIBEMS for the above chief complaint. history goes back to around 2:30 pm after running errands when patient mentioned feeling "funny". she saw her neighbors pulling in and she told them that she was not feeling well. neighbors spoke to daughter who spoke to her and noticed that she was confused -> daughter called her  who went to see her. by that time neighbors already called the ambulance who brought the patient to the [facility] ED where she was aphasic with a NHSS of 6. to note thatPatient lives alone. Patient did not take her Xarelto dose and her last dose was 9/21/22. [facility] ED discussed with Dr Garcia at time of stroke code. -> Patient was transferred to [facility] for stroke workup.     VITALS:   T(C): 36.4 (09-23-22 @ 18:03), Max: 36.6 (09-23-22 @ 16:14)  HR: 60 (09-23-22 @ 18:12) (60 - 81)  BP: 189/88 (09-23-22 @ 18:12) (169/73 - 189/88)  RR: 16 (09-23-22 @ 18:12) (16 - 16)  SpO2: 99% (09-23-22 @ 18:12) (97% - 99%)      in the ED, vitals were significant for HTN. labs were unremarkable. neurology consulted and stroke workup initiated. CTH-negative for acute findings. CTA head/neck-Focal severe stenosis of right distal ICA supraclinoid segment. Focal mild/moderate stenosis at left distal ICA supraclinoid segment. CTP-Small 32 mL perfusion abnormality in the left parietal region corresponding to occlusion of left P4 segment (3-317). Patient's NIH improved from NIH 5--->2--->0. Patient is not a tpa or IA candidate (as no LVO). admitted to stroke unit for observation   (23 Sep 2022 20:04)      PAST MEDICAL & SURGICAL HISTORY:  History of atrial fibrillation      H/O mitral valve repair  2003      Hypertension      Hyperlipidemia      Overactive bladder      Cardiac pacemaker      H/O mitral valve repair      S/P ablation of atrial fibrillation  2x (2011 and 2012)          Hospital Course:  Aphasia and left upper extremity weakness resolved in 3 hours.   Speech therapy just evaluated and she is doing wonderfully.  She amb with S without an assistive dev with PT.  TODAY'S SUBJECTIVE & REVIEW OF SYMPTOMS:     Constitutional WNL   Cardio WNL   Resp WNL   GI WNL  Heme WNL  Endo WNL  Skin WNL  MSK WNL  Neuro WNL  Cognitive WNL  Psych WNL      MEDICATIONS  (STANDING):  atorvastatin 20 milliGRAM(s) Oral at bedtime  famotidine    Tablet 20 milliGRAM(s) Oral daily  flecainide 50 milliGRAM(s) Oral every 12 hours  metoprolol tartrate 50 milliGRAM(s) Oral three times a day  pantoprazole    Tablet 40 milliGRAM(s) Oral before breakfast  rivaroxaban 20 milliGRAM(s) Oral with dinner  sodium chloride 0.9%. 1000 milliLiter(s) (100 mL/Hr) IV Continuous <Continuous>    MEDICATIONS  (PRN):  acetaminophen     Tablet .. 650 milliGRAM(s) Oral every 6 hours PRN Temp greater or equal to 38C (100.4F), Mild Pain (1 - 3)  aluminum hydroxide/magnesium hydroxide/simethicone Suspension 30 milliLiter(s) Oral every 4 hours PRN Dyspepsia  melatonin 3 milliGRAM(s) Oral at bedtime PRN Insomnia  ondansetron Injectable 4 milliGRAM(s) IV Push every 8 hours PRN Nausea and/or Vomiting      FAMILY HISTORY:      Allergies    fluconazole (Hives)  sulfa drugs (Unknown)    Intolerances        SOCIAL HISTORY:    [  ] Etoh  [  ] Smoking  [  ] Substance abuse     Home Environment:  [x  ] Home Alone  [  ] Lives with Family  [  ] Home Health Aid    Dwelling:  [  ] Apartment  [x  ] Private House  [  ] Adult Home  [  ] Skilled Nursing Facility      [  ] Short Term  [  ] Long Term  [x  ] Stairs-[employer]       Elevator [  ]    FUNCTIONAL STATUS PTA: (Check all that apply)  Ambulation: [ x  ]Independent    [  ] Dependent     [  ] Non-Ambulatory  Assistive Device: [  ] SA Cane  [  ]  Q Cane  [  ] Walker  [  ]  Wheelchair  ADL : [ x ] Independent  [  ]  Dependent       Vital Signs Last 24 Hrs  T(C): 36.6 (24 Sep 2022 08:00), Max: 36.6 (23 Sep 2022 16:14)  T(F): 97.9 (24 Sep 2022 08:00), Max: 97.9 (24 Sep 2022 05:00)  HR: 61 (24 Sep 2022 13:00) (60 - 81)  BP: 175/75 (24 Sep 2022 13:00) (133/55 - 193/80)  BP(mean): 108 (24 Sep 2022 13:00) (74 - 114)  RR: 18 (24 Sep 2022 13:00) (16 - 18)  SpO2: 99% (24 Sep 2022 13:00) (96% - 100%)    Parameters below as of 24 Sep 2022 13:00  Patient On (Oxygen Delivery Method): room air          PHYSICAL EXAM: Alert & Oriented X3  GENERAL: NAD, well-groomed, well-developed  HEAD:  Atraumatic, Normocephalic  EYES: EOMI, PERRLA, conjunctiva and sclera clear  NECK: Supple, No JVD, Normal thyroid  CHEST/LUNG: Clear bilaterally; No rales, rhonchi, wheezing, or rubs  HEART: Regular rate and rhythm; No murmurs, rubs, or gallops  ABDOMEN: Soft, Nontender, Nondistended; Bowel sounds present  EXTREMITIES:  2+ Peripheral Pulses, No clubbing, cyanosis, or edema    NERVOUS SYSTEM:  Cranial Nerves 2-12 intact [  ] Abnormal  [  ]  She repeats "No ifs ands or buts about it." nicely.  ROM: WFL all extremities [  ]  Abnormal [  ]  Motor Strength: WFL all extremities  [x ]  Abnormal [  ]  Sensation: intact to light touch [  ] Abnormal [  ]  Reflexes: Symmetric [  ]  Abnormal [  ]    FUNCTIONAL STATUS:  Bed Mobility: Independent [  ]  Supervision [  ]  Needs Assistance [  ]  N/A [  ]  Transfers: Independent [  ]  Supervision [  ]  Needs Assistance [  ]  N/A [  ]   Ambulation: Independent [  ]  Supervision [  ]  Needs Assistance [  ]  N/A [  ]  ADL: Independent [  ] Requires Assistance [  ] N/A [  ]      LABS:                        11.5   8.07  )-----------( 175      ( 24 Sep 2022 06:11 )             33.5     09-24    138  |  105  |  17  ----------------------------<  86  4.3   |  21  |  0.9    Ca    8.9      24 Sep 2022 06:11  Mg     2.0     09-24    TPro  5.9<L>  /  Alb  3.8  /  TBili  0.6  /  DBili  x   /  AST  26  /  ALT  22  /  AlkPhos  64  09-24    PT/INR - ( 23 Sep 2022 17:09 )   PT: 12.80 sec;   INR: 1.11 ratio         PTT - ( 23 Sep 2022 17:09 )  PTT:28.7 sec      RADIOLOGY & ADDITIONAL STUDIES:    Assesment:

## 2022-09-24 NOTE — SWALLOW BEDSIDE ASSESSMENT ADULT - SLP GENERAL OBSERVATIONS
Pt. received awake, alert, ox4, room air, clear speech, no c/o pain. Accompanied by Pt's daughter at bedside.

## 2022-09-24 NOTE — PHYSICAL THERAPY INITIAL EVALUATION ADULT - GENERAL OBSERVATIONS, REHAB EVAL
Pt encountered in semi-rios position in bed A & O x 4 in NAD, +tele, no c/o pain and agreeable with PT. Pt requires supervision in bed mobility, bed/chair/toilet transfer mobility and ambulation 50 ft without AD. Stair training n/t at this time secondary fatigue. Pt left seated in recliner chair in NAD after therapy session, TERRANCE Sanchez/MD aware.

## 2022-09-24 NOTE — PROGRESS NOTE ADULT - SUBJECTIVE AND OBJECTIVE BOX
Neurology Progress Note    Interval History:    Patient was seen and examined, no acute event over night. feels tired due to lack of sleep. Denies any weakness, numbness, difficulty speech or swallow. Walked with PT, was able to tolerate full assessment.     Medications:  acetaminophen     Tablet .. 650 milliGRAM(s) Oral every 6 hours PRN  aluminum hydroxide/magnesium hydroxide/simethicone Suspension 30 milliLiter(s) Oral every 4 hours PRN  atorvastatin 20 milliGRAM(s) Oral at bedtime  famotidine    Tablet 20 milliGRAM(s) Oral daily  flecainide 50 milliGRAM(s) Oral every 12 hours  melatonin 3 milliGRAM(s) Oral at bedtime PRN  metoprolol tartrate 50 milliGRAM(s) Oral three times a day  ondansetron Injectable 4 milliGRAM(s) IV Push every 8 hours PRN  pantoprazole    Tablet 40 milliGRAM(s) Oral before breakfast  rivaroxaban 20 milliGRAM(s) Oral with dinner  sodium chloride 0.9%. 1000 milliLiter(s) IV Continuous <Continuous>      Vital Signs Last 24 Hrs  T(C): 36.6 (24 Sep 2022 08:00), Max: 36.6 (23 Sep 2022 16:14)  T(F): 97.9 (24 Sep 2022 08:00), Max: 97.9 (24 Sep 2022 05:00)  HR: 65 (24 Sep 2022 09:59) (60 - 81)  BP: 161/66 (24 Sep 2022 09:59) (133/55 - 193/80)  BP(mean): 100 (24 Sep 2022 09:59) (74 - 114)  RR: 18 (24 Sep 2022 09:59) (16 - 18)  SpO2: 100% (24 Sep 2022 09:59) (96% - 100%)    Parameters below as of 24 Sep 2022 09:59  Patient On (Oxygen Delivery Method): room air        Neurological Examination:  General:  Appearance is consistent with chronologic age.   Cognitive/Language:  Awake, alert, and oriented to person, place, time and date.  Recent and remote memory intact.  Fund of knowledge is appropriate.  Naming, repetition and comprehension intact. Nondysarthric.    Cranial Nerves  - Eyes: Visual acuity intact, Visual fields full.  EOMI w/o nystagmus, skew or reported double vision.  PERRL.  No ptosis/weakness of eyelid closure.    - Face:  Facial sensation normal V1 - 3, no facial asymmetry.    - Ears/Nose/Throat:  Hearing grossly intact b/l to finger rub.  Palate elevates midline.  Tongue and uvula midline.   Motor examination:  (MRC grade R/L) 5/5 UE; 5/5 LE.  No observable drift. Normal tone and bulk. No tenderness, twitching, tremors or involuntary movements.  Sensory examination:  Intact to light touch and pinprick, pain, temperature and proprioception and vibration in all extremities.  Reflexes:   2+ b/l biceps, triceps, patella and achilles.  Plantar response downgoing b/l.  Jaw jerk, Jomar, clonus absent.  Cerebellum:   FTN/HKS intact.  No dysmetria.    Gait narrow based and normal.    Labs:  CBC Full  -  ( 24 Sep 2022 06:11 )  WBC Count : 8.07 K/uL  RBC Count : 3.52 M/uL  Hemoglobin : 11.5 g/dL  Hematocrit : 33.5 %  Platelet Count - Automated : 175 K/uL  Mean Cell Volume : 95.2 fL  Mean Cell Hemoglobin : 32.7 pg  Mean Cell Hemoglobin Concentration : 34.3 g/dL  Auto Neutrophil # : 5.32 K/uL  Auto Lymphocyte # : 2.01 K/uL  Auto Monocyte # : 0.56 K/uL  Auto Eosinophil # : 0.12 K/uL  Auto Basophil # : 0.05 K/uL  Auto Neutrophil % : 66.0 %  Auto Lymphocyte % : 24.9 %  Auto Monocyte % : 6.9 %  Auto Eosinophil % : 1.5 %  Auto Basophil % : 0.6 %    09-24    138  |  105  |  17  ----------------------------<  86  4.3   |  21  |  0.9    Ca    8.9      24 Sep 2022 06:11  Mg     2.0     09-24    TPro  5.9<L>  /  Alb  3.8  /  TBili  0.6  /  DBili  x   /  AST  26  /  ALT  22  /  AlkPhos  64  09-24    LIVER FUNCTIONS - ( 24 Sep 2022 06:11 )  Alb: 3.8 g/dL / Pro: 5.9 g/dL / ALK PHOS: 64 U/L / ALT: 22 U/L / AST: 26 U/L / GGT: x           PT/INR - ( 23 Sep 2022 17:09 )   PT: 12.80 sec;   INR: 1.11 ratio         PTT - ( 23 Sep 2022 17:09 )  PTT:28.7 sec      RADIOLOGY & ADDITIONAL TESTS:  < from: CT Brain Perfusion Maps Stroke (09.23.22 @ 16:49) >      IMPRESSION:    Small 32 mL perfusion abnormality in the left parietal region   corresponding to occlusion of left P4 segment (3-317).    No intracranial small aneurysm.    Focal severe stenosis of right distal ICA supraclinoid segment. Focal   mild/moderate stenosis at left distal ICA supraclinoid segment.    Dr. Dandre Hernandez discussed preliminary findings with LIGIA CRUZ DO   on 9/23/2022 5:38 PM with readback.    --- End of Report ---      < end of copied text >  < from: CT Brain Stroke Protocol (09.23.22 @ 16:25) >    IMPRESSION:  No CT evidence of large acute territorial infarct.    No evidence of acute intracranial pathology. No mass effect, midline   shift or intracranial hemorrhage.    Mild chronic microvascular type changes as well as a chronic right   cerebellar infarct.    These findings were discussed with Dr. BAILEE GILMORE [phone] at   9/23/2022 4:32 PM by Dr. Rose with read back confirmation.    --- End of Report ---    < end of copied text >

## 2022-09-24 NOTE — OCCUPATIONAL THERAPY INITIAL EVALUATION ADULT - PERTINENT HX OF CURRENT PROBLEM, REHAB EVAL
76Y female with PMHx of Afib on Xarelto, HTN, HLD, PPM, minimally invasive mitral valve repair (2003) recent admission for hyponatremia BIBEMS for the above chief complaint. history goes back to around 2:30 pm after running errands when patient mentioned feeling "funny". she saw her neighbors pulling in and she told them that she was not feeling well. neighbors spoke to daughter who spoke to her and noticed that she was confused -> daughter called her  who went to see her. by that time neighbors already called the ambulance who brought the patient to the [facility] ED where she was aphasic with a NHSS of 6

## 2022-09-24 NOTE — SWALLOW BEDSIDE ASSESSMENT ADULT - COMMENTS
Pt's daughter reports communication and naming is back to baseline.   Language screen completed. Patient is ox4, has clear speech with adequate content. Expressive and receptive language appears WFL for functional communication, intact naming, command following.

## 2022-09-24 NOTE — SWALLOW BEDSIDE ASSESSMENT ADULT - SLP PERTINENT HISTORY OF CURRENT PROBLEM
75 Yo female with PMHx of Afib on Xarelto, HTN, HLD, PPM. BIBEMS to [facility] ED for AMS, in [facility] ED, Pt. was not able to provide any history and was noted to be aphasic. Transferred to [place] ED, no tPA candidate. CTH-negative. CTA head/neck-Focal severe stenosis of right distal ICA supraclinoid segment. CTP-Small perfusion abnormality in the left parietal region corresponding to occlusion of left P4 segment. Awaiting MRI.

## 2022-09-24 NOTE — OCCUPATIONAL THERAPY INITIAL EVALUATION ADULT - ADDITIONAL COMMENTS
Pt lives in [place] with 2 GORAN and no DME. Ind with ADLs/IADLs PTA and uses a bathtub with 2 grab bars no shower chair and standard toilet.

## 2022-09-24 NOTE — CONSULT NOTE ADULT - ASSESSMENT
IMPRESSION: Rehab of TIA, deficits last 3 hours    PRECAUTIONS: [x  ] Cardiac  [  ] Respiratory  [  ] Seizures [  ] Contact Isolation  [  ] Droplet Isolation  [  ] Other    Weight Bearing Status:     RECOMMENDATION:    Out of Bed to Chair     DVT/Decubiti Prophylaxis    REHAB PLAN:     [   ] Bedside P/T 3-5 times a week   [   ]   Bedside O/T  2-3 times a week             [   ] No Rehab Therapy Indicated                   [   ]  Speech Therapy   Conditioning/ROM                                    ADL  Bed Mobility                                               Conditioning/ROM  Transfers                                                     Bed Mobility  Sitting /Standing Balance                         Transfers                                        Gait Training                                               Sitting/Standing Balance  Stair Training [   ]Applicable                    Home equipment Eval                                                                        Splinting  [   ] Only      GOALS:   ADL   [ x  ]   Independent                    Transfers  [ x  ] Independent                          Ambulation  [ x  ] Independent     [    ] With device                            [   ]  CG                                                         [   ]  CG                                                                  [   ] CG                            [    ] Min A                                                   [   ] Min A                                                              [   ] Min  A          DISCHARGE PLAN:   [   ]  Good candidate for Intensive Rehabilitation/Hospital based-[facility]                                             Will tolerate 3hrs Intensive Rehab Daily                                       [    ]  Short Term Rehab in Skilled Nursing Facility                                       [  x  ]  Home with Outpatient or  services                                         [    ]  Possible Candidate for Intensive Hospital based Rehab

## 2022-09-24 NOTE — OCCUPATIONAL THERAPY INITIAL EVALUATION ADULT - GENERAL OBSERVATIONS, REHAB EVAL
Pt encountered  in NAD and cooperative to OT IE.  + tele, + BP cuff, + IV, + Oxy. Vitals: /66 HR 65 O2 99.

## 2022-09-24 NOTE — OCCUPATIONAL THERAPY INITIAL EVALUATION ADULT - TRANSFER SAFETY CONCERNS NOTED: TOILET, REHAB EVAL
89-year-old female with history of chronic anemia, previous blood transfusions in the past presents for hemoglobin of 6.8 found on outpatient lab work from nursing home.  Patient with no complaints.  Denies any bleeding hematuria blood in stool abdominal pain nausea vomiting black or tarry stool dizziness lightheadedness syncope or shortness of breath. stand pivot

## 2022-09-25 LAB
ANION GAP SERPL CALC-SCNC: 10 MMOL/L — SIGNIFICANT CHANGE UP (ref 7–14)
BUN SERPL-MCNC: 17 MG/DL — SIGNIFICANT CHANGE UP (ref 10–20)
CALCIUM SERPL-MCNC: 9 MG/DL — SIGNIFICANT CHANGE UP (ref 8.4–10.5)
CHLORIDE SERPL-SCNC: 107 MMOL/L — SIGNIFICANT CHANGE UP (ref 98–110)
CO2 SERPL-SCNC: 25 MMOL/L — SIGNIFICANT CHANGE UP (ref 17–32)
CREAT SERPL-MCNC: 1.1 MG/DL — SIGNIFICANT CHANGE UP (ref 0.7–1.5)
EGFR: 52 ML/MIN/1.73M2 — LOW
GLUCOSE SERPL-MCNC: 84 MG/DL — SIGNIFICANT CHANGE UP (ref 70–99)
HCT VFR BLD CALC: 32.1 % — LOW (ref 37–47)
HGB BLD-MCNC: 11.1 G/DL — LOW (ref 12–16)
MAGNESIUM SERPL-MCNC: 1.9 MG/DL — SIGNIFICANT CHANGE UP (ref 1.8–2.4)
MCHC RBC-ENTMCNC: 32.4 PG — HIGH (ref 27–31)
MCHC RBC-ENTMCNC: 34.6 G/DL — SIGNIFICANT CHANGE UP (ref 32–37)
MCV RBC AUTO: 93.6 FL — SIGNIFICANT CHANGE UP (ref 81–99)
NRBC # BLD: 0 /100 WBCS — SIGNIFICANT CHANGE UP (ref 0–0)
PHOSPHATE SERPL-MCNC: 3.4 MG/DL — SIGNIFICANT CHANGE UP (ref 2.1–4.9)
PLATELET # BLD AUTO: 162 K/UL — SIGNIFICANT CHANGE UP (ref 130–400)
POTASSIUM SERPL-MCNC: 4.4 MMOL/L — SIGNIFICANT CHANGE UP (ref 3.5–5)
POTASSIUM SERPL-SCNC: 4.4 MMOL/L — SIGNIFICANT CHANGE UP (ref 3.5–5)
RBC # BLD: 3.43 M/UL — LOW (ref 4.2–5.4)
RBC # FLD: 15.1 % — HIGH (ref 11.5–14.5)
SODIUM SERPL-SCNC: 142 MMOL/L — SIGNIFICANT CHANGE UP (ref 135–146)
WBC # BLD: 6.69 K/UL — SIGNIFICANT CHANGE UP (ref 4.8–10.8)
WBC # FLD AUTO: 6.69 K/UL — SIGNIFICANT CHANGE UP (ref 4.8–10.8)

## 2022-09-25 PROCEDURE — 99233 SBSQ HOSP IP/OBS HIGH 50: CPT | Mod: GC

## 2022-09-25 PROCEDURE — 99233 SBSQ HOSP IP/OBS HIGH 50: CPT

## 2022-09-25 RX ADMIN — Medication 50 MILLIGRAM(S): at 06:10

## 2022-09-25 RX ADMIN — Medication 50 MILLIGRAM(S): at 06:14

## 2022-09-25 RX ADMIN — Medication 50 MILLIGRAM(S): at 21:34

## 2022-09-25 RX ADMIN — FAMOTIDINE 20 MILLIGRAM(S): 10 INJECTION INTRAVENOUS at 11:54

## 2022-09-25 RX ADMIN — RIVAROXABAN 20 MILLIGRAM(S): KIT at 17:02

## 2022-09-25 RX ADMIN — ATORVASTATIN CALCIUM 20 MILLIGRAM(S): 80 TABLET, FILM COATED ORAL at 21:45

## 2022-09-25 RX ADMIN — SENNA PLUS 2 TABLET(S): 8.6 TABLET ORAL at 21:33

## 2022-09-25 RX ADMIN — PANTOPRAZOLE SODIUM 40 MILLIGRAM(S): 20 TABLET, DELAYED RELEASE ORAL at 06:09

## 2022-09-25 RX ADMIN — Medication 50 MILLIGRAM(S): at 17:03

## 2022-09-25 RX ADMIN — Medication 50 MILLIGRAM(S): at 13:13

## 2022-09-25 RX ADMIN — SODIUM CHLORIDE 44 MILLILITER(S): 9 INJECTION INTRAMUSCULAR; INTRAVENOUS; SUBCUTANEOUS at 12:31

## 2022-09-25 NOTE — PROGRESS NOTE ADULT - SUBJECTIVE AND OBJECTIVE BOX
[employer]  76y  Female    Patient is a 76y old  Female who presents with a chief complaint of AMS (23 Sep 2022 20:04)      HPI:   76Y female with PMHx of Afib on Xarelto, HTN, HLD, PPM, minimally invasive mitral valve repair (2003) recent admission for hyponatremia BIBEMS for the above chief complaint. history goes back to around 2:30 pm after running errands when patient mentioned feeling "funny". she saw her neighbors pulling in and she told them that she was not feeling well. neighbors spoke to daughter who spoke to her and noticed that she was confused -> daughter called her  who went to see her. by that time neighbors already called the ambulance who brought the patient to the [facility] ED where she was aphasic with a NHSS of 6. to note thatPatient lives alone. Patient did not take her Xarelto dose and her last dose was 9/21/22. [facility] ED discussed with Dr Garcia at time of stroke code. -> Patient was transferred to [facility] for stroke workup.     VITALS:   T(C): 36.4 (09-23-22 @ 18:03), Max: 36.6 (09-23-22 @ 16:14)  HR: 60 (09-23-22 @ 18:12) (60 - 81)  BP: 189/88 (09-23-22 @ 18:12) (169/73 - 189/88)  RR: 16 (09-23-22 @ 18:12) (16 - 16)  SpO2: 99% (09-23-22 @ 18:12) (97% - 99%)      in the ED, vitals were significant for HTN. labs were unremarkable. neurology consulted and stroke workup initiated. CTH-negative for acute findings. CTA head/neck-Focal severe stenosis of right distal ICA supraclinoid segment. Focal mild/moderate stenosis at left distal ICA supraclinoid segment. CTP-Small 32 mL perfusion abnormality in the left parietal region corresponding to occlusion of left P4 segment (3-317). Patient's NIH improved from NIH 5--->2--->0. Patient is not a tpa or IA candidate (as no LVO). admitted to stroke unit for observation   (23 Sep 2022 20:04)    Additional Hx: pt also reports RUE weakness yesterday during the episode.    S: Patient was examined and seen at bedside. This morning pt is resting comfortably in bed and reports no new issues or overnight events. No complaints, feels much better (back to baseline)  Denies CP, SOB, N/V/D/C/AP, cough, F, chills, dizziness, new focal weakness, HA, vision changes, dysuria, or urinary symptoms, blood in stool.  ROS: all other systems reviewed and are negative    PAST MEDICAL & SURGICAL HISTORY:  History of atrial fibrillation      H/O mitral valve repair  2003      Hypertension      Hyperlipidemia      Overactive bladder      Cardiac pacemaker      H/O mitral valve repair      S/P ablation of atrial fibrillation  2x (2011 and 2012)      General: NAD. Looks stated age.  HEENT: clean oropharynx, EOMI, no LAD  Neck: trachea midline, no thyromegaly  CV: nl S1 S2; no m/r/g  Resp: decreased breath sounds at base  GI: NT/ND/S +BS  MS: no clubbing/cyanosis/edema, +pulses  Neuro: motor, sensory intact; + reflexes  Skin: no rashes, nl turgor  Psychiatric: AA0x3 w/ intact insight and judgement    tele: afib, nonspecific changes (on my own evaluation of tele monitor)            MEDICATIONS  (STANDING):  atorvastatin 20 milliGRAM(s) Oral at bedtime  famotidine    Tablet 20 milliGRAM(s) Oral daily  flecainide 50 milliGRAM(s) Oral every 12 hours  metoprolol tartrate 50 milliGRAM(s) Oral every 8 hours  pantoprazole    Tablet 40 milliGRAM(s) Oral before breakfast  rivaroxaban 20 milliGRAM(s) Oral with dinner  senna 2 Tablet(s) Oral at bedtime  sodium chloride 0.9%. 1000 milliLiter(s) (44 mL/Hr) IV Continuous <Continuous>    MEDICATIONS  (PRN):  acetaminophen     Tablet .. 650 milliGRAM(s) Oral every 6 hours PRN Temp greater or equal to 38C (100.4F), Mild Pain (1 - 3)  aluminum hydroxide/magnesium hydroxide/simethicone Suspension 30 milliLiter(s) Oral every 4 hours PRN Dyspepsia  melatonin 3 milliGRAM(s) Oral at bedtime PRN Insomnia  ondansetron Injectable 4 milliGRAM(s) IV Push every 8 hours PRN Nausea and/or Vomiting    Home Medications:  famotidine 20 mg oral tablet: 1 tab(s) orally once a day (23 Sep 2022 21:00)  flecainide 50 mg oral tablet: 1 tab(s) orally every 12 hours (23 Sep 2022 21:00)  Metoprolol Tartrate 50 mg oral tablet: 1 tab(s) orally 3 times a day (23 Sep 2022 21:00)  rosuvastatin 5 mg oral tablet: 1 tab(s) orally once a day (23 Sep 2022 21:00)  Vitamin D3 125 mcg (5000 intl units) oral tablet: 1 tab(s) orally once a day (23 Sep 2022 21:00)  Xarelto 20 mg oral tablet: 1 tab(s) orally once a day (in the evening) (23 Sep 2022 21:00)    Vital Signs Last 24 Hrs  T(C): 36.6 (25 Sep 2022 08:17), Max: 36.9 (25 Sep 2022 00:07)  T(F): 97.9 (25 Sep 2022 08:17), Max: 98.4 (25 Sep 2022 00:07)  HR: 60 (25 Sep 2022 10:00) (53 - 70)  BP: 155/77 (25 Sep 2022 10:00) (147/76 - 190/83)  BP(mean): 132 (25 Sep 2022 07:00) (88 - 132)  RR: 18 (25 Sep 2022 10:00) (16 - 19)  SpO2: 100% (25 Sep 2022 10:00) (95% - 100%)    Parameters below as of 25 Sep 2022 10:00  Patient On (Oxygen Delivery Method): room air      CAPILLARY BLOOD GLUCOSE        LABS:                        11.1   6.69  )-----------( 162      ( 25 Sep 2022 06:01 )             32.1     09-25    142  |  107  |  17  ----------------------------<  84  4.4   |  25  |  1.1    Ca    9.0      25 Sep 2022 06:01  Phos  3.4     09-25  Mg     1.9     09-25    TPro  5.9<L>  /  Alb  3.8  /  TBili  0.6  /  DBili  x   /  AST  26  /  ALT  22  /  AlkPhos  64  09-24    LIVER FUNCTIONS - ( 24 Sep 2022 06:11 )  Alb: 3.8 g/dL / Pro: 5.9 g/dL / ALK PHOS: 64 U/L / ALT: 22 U/L / AST: 26 U/L / GGT: x           CARDIAC MARKERS ( 23 Sep 2022 17:09 )  x     / <0.01 ng/mL / x     / x     / x          PT/INR - ( 23 Sep 2022 17:09 )   PT: 12.80 sec;   INR: 1.11 ratio         PTT - ( 23 Sep 2022 17:09 )  PTT:28.7 sec            Consultant Notes Reviewed:  [x ] YES  [ ] NO  Care Discussed with Consultants/Other Providers/ Housestaff [ x] YES  [ ] NO  Radiology, labs, new studies personally reviewed.

## 2022-09-25 NOTE — CHART NOTE - NSCHARTNOTEFT_GEN_A_CORE
Electrophysiology    Pts device  DC PPM (Powerlytics______ was interrogated on 08-16-22 in the EP office  Device working properly    Device Check The patient is not pacemaker dependent.   Underlying Rhythm: normal sinus rhythm.   Device Type: pacemaker   Pacemaker/ICD : Powerlytics.   Model: Accolade. Serial Number: [id number]. Date of Implant: 1/29/18.   Mode: DDDR. Rate: 60.   Battery Status: The estimated remaining battery life is 3.5 yrs months. The battery status is normal.   Measurement: Threshold testing was performed.   Atrial: lead impedance was 550 ohms. sensing amplitude was 0.6 mv. Pacing Threshold: 0.9 V @ 0.4 ms.   Rt Ventricle:. lead impedance was 647 ohms. sensing amplitude was 5.2 mv. Pacing Threshold: 1.5 V @ 0.4 ms.   Program Changes: none.   Comments:.  54 % AP 44%.   No new events.    Pt is known to the service  76 y/o with h/o of MVR MAZE, AF/AFL s/p AF ablation x2, recurrent Afib/ Fl , rhythm controlled with Flecainide, CHB , she underwent a Dual Chamber PPM implantation on 1/29/2018    Results d/w with primary team [id number]      Contact EP ACP with any questions [phone]

## 2022-09-25 NOTE — PROGRESS NOTE ADULT - ASSESSMENT
76Y female with PMHx of Afib on Xarelto, HTN, HLD, PPM, recent admission for hyponatremia BIBEMS for AMS. Patient was first in [facility] ED -> NIHSS 6 for aphasia and confusion-> transferred to [place] ED -> imaging done -> Small 32 mL perfusion abnormality in the left parietal region corresponding to occlusion of left P4 segment but NIHSS was 0 and patient not a candidate for TPA or NI. admitted to stroke unit for further work up and evaluations:  -LKW 10 pm as per daughter who talked to her over the phone.  Admission NIHSS 6 -> transferred to [facility] ED- NIHSS- 0  -Extracranial carotid stenosis   -TIA      #R/o Acute ischemic stroke vs TIA, Pt with Hx of Afib on Xarelto, missed one dose the night before:   - no sepsis POA  - Stroke Core measures- HGBA1C - 5.6  - On statin  - Augment SBP since exam improved at elevated .  - CTH -> negative for acute findings.   - CTA head/neck -> Focal severe stenosis of right distal ICA supraclinoid segment. Focal mild/moderate stenosis at left distal ICA supraclinoid segment.   - CTP-Small 32 mL perfusion abnormality in the left parietal region corresponding to occlusion of left P4 segment   - not a candidate for tpa or IA  - q2 neurochecks  - Permissive HTN SBP goal 160 - 200, w ICA stenosis for the next 72 hours.  - Hold Metoprolol for SBP < 160   - Q2 neuro and vital check for the next 48 hours.   - Resume Xarelto   - MRI brain non contrast, Pt has pacemaker, copy of the card in the chart.   - TTE with bubble, performed, reported significant findings, although no PFO.   - a1c 5.6  - LDL 46, C/w home mod dose statin  - PT/OT/ST/rehab eval   - S/S evaled and passed  - NI consulted regarding CTA findings.   - B/l Carotid duplex requested.     #Chronic Afib  - MRI compatability for Pacemaker  - Interrogate  PM  - Maintain electrolytes Mg - > 2.0; Phos - > 3.0 and K - > 4.0  - Echo as above  - s/p ablation 2x (2011 and 2012)  - resume Xarelto tomorrow  - c/w flecainide, metoprolol tartrate 50 mg TID  - Cardiologist is Dr. Mastachiulo    #Hs  of pacemaker   - [employer]), copy of the card is in the chart.     # Hx minimally invasive mitral valve repair  - in 2003    #hyperlipidemia, improved, LDL 46  - on rosuvastatin at home, c/w atorvastatin while in patient- mod dose     #MISC  DVT PPX Xarelto  GI PPX Protonix  DIET soft and bite sized until S&S eval  CODE FULL   Dispo: Stroke unit, active. Independent per PT, likely home, pending clinical course.    76Y female with PMHx of Afib on Xarelto, HTN, HLD, PPM, recent admission for hyponatremia BIBEMS for AMS. Patient was first in [facility] ED -> NIHSS 6 for aphasia and confusion-> transferred to [place] ED -> imaging done -> Small 32 mL perfusion abnormality in the left parietal region corresponding to occlusion of left P4 segment but NIHSS was 0 and patient not a candidate for TPA or NI. admitted to stroke unit for further work up and evaluations:  -LKW 10 pm as per daughter who talked to her over the phone.  Admission NIHSS 6 -> transferred to [place] ED- NIHSS- 0  -Extracranial carotid stenosis   -TIA      #R/o Acute ischemic stroke vs TIA, Pt with Hx of Afib on Xarelto, missed one dose the night before:   - no sepsis POA  - Stroke Core measures- HGBA1C - 5.6  - On statin  - Augment SBP since exam improved at elevated .  - CTH -> negative for acute findings.   - CTA head/neck -> Focal severe stenosis of right distal ICA supraclinoid segment. Focal mild/moderate stenosis at left distal ICA supraclinoid segment.   - CTP-Small 32 mL perfusion abnormality in the left parietal region corresponding to occlusion of left P4 segment   - not a candidate for tpa or IA  - q2 neurochecks  - Permissive HTN SBP goal 150 - 200, w ICA stenosis   lopressor with parameters  - Q2 neuro and vital check for the next 48 hours.   - Resume Xarelto   - MRI brain non contrast, Pt has pacemaker, copy of the card in the chart.   - TTE with bubble, performed, reported significant findings, although no PFO.   - a1c 5.6  - LDL 46, C/w home mod dose statin  - PT/OT/ST/rehab eval   - S/S evaled and passed  - NI consulted regarding CTA findings.   - B/l Carotid duplex requested.     #Chronic Afib  - MRI compatability for Pacemaker  - Interrogate  PM  - Maintain electrolytes Mg - > 2.0; Phos - > 3.0 and K - > 4.0  - Echo as above  - s/p ablation 2x (2011 and 2012)  - resume Xarelto tomorrow  - c/w flecainide, metoprolol tartrate 50 mg TID  - Cardiologist is Dr. Ng    #Hs  of pacemaker   - [employer] to u), copy of the card is in the chart.     # Hx minimally invasive mitral valve repair  - in 2003    #hyperlipidemia, improved, LDL 46  - on rosuvastatin at home, c/w atorvastatin while in patient- mod dose     #MISC  DVT PPX Xarelto  GI PPX pepcid (home med)  DIET soft and bite sized until S&S eval  CODE FULL   Dispo: Stroke unit, active. Independent per PT, likely home, pending clinical course.

## 2022-09-25 NOTE — PROGRESS NOTE ADULT - ASSESSMENT
76Y female with PMHx of Afib on Xarelto, HTN, HLD, PPM, recent admission for hyponatremia BIBEMS for AMS. Patient was first in [facility] ED -> NIHSS 6 for aphasia and confusion-> transferred to [place] ED -> imaging done -> Small 32 mL perfusion abnormality in the left parietal region corresponding to occlusion of left P4 segment but NIHSS was 0 and patient not a candidate for TPA or NI. admitted to stroke unit for further work up and evaluations:     #Acute ischemic stroke vs TIA, Pt with Hx of Afib on Xarelto, missed one dose the night before:   #Intracranial stenosis  - LKW 10 pm as per daughter who talked to her over the phone.  - first in [facility] ED -> NIHSS 6 -> transferred to [place] ED  - CTH -> negative for acute findings.   - CTA head/neck -> Focal severe stenosis of right distal ICA supraclinoid segment. Focal mild/moderate stenosis at left distal ICA supraclinoid segment.   - CTP-Small 32 mL perfusion abnormality in the left parietal region corresponding to occlusion of left P4 segment   - NIHSS improved to 0  - not a candidate for tpa or IA  - q2 neurochecks  - Permissive HTN SBP goal 160 - 200, w ICA stenosis.    - Resumed Xarelto   - MRI brain non contrast, Pt has pacemaker, copy of the card in the chart.   - TTE: nl EF  - a1c 5.6  - LDL 46, C/w home meds  - PT/OT/ST/rehab eval   - PT eval, outpatient PT.   - NI consulted regarding CTA findings.   -consider adding ASA 81    #Chronic Afib  - s/p ablation 2x (2011 and 2012)  - resume Xarelto as per neuro  - c/w flecainide, metoprolol tartrate 50 mg TID (can give lower dose temporarily  if needed for permissive HTN)  - Cardiologist is Dr. Ng    #HTN  permissive HTN for now    #Hs  of pacemaker   - [employer]), copy of the card is in the chart.     # Hx minimally invasive mitral valve repair  - in 2003    #hyperlipidemia, LDL 46  - on rosuvastatin at home, c/w atorvastatin while in patient    #MISC  DVT PPX Xarelto  GI PPX Protonix  CODE FULL     #Progress Note Handoff  Pending: Consults____Clinical improvement and stability__x___Tests__MRI, NI C/s_____PT____x____  Pt/Family discussion: Pt informed and agrees with the current plan  Disposition: Home___    My note supersedes the residents note should a discrepancy arise.    Chart and notes personally reviewed.  Care Discussed with Consultants/Other Providers/ Housestaff [ x] YES [ ] NO   Radiology, labs, old records personally reviewed.

## 2022-09-25 NOTE — PROGRESS NOTE ADULT - SUBJECTIVE AND OBJECTIVE BOX
Neurology Progress Note    Interval History:    Patient was seen and examined, no acute event over night.     Medications:  acetaminophen     Tablet .. 650 milliGRAM(s) Oral every 6 hours PRN  aluminum hydroxide/magnesium hydroxide/simethicone Suspension 30 milliLiter(s) Oral every 4 hours PRN  atorvastatin 20 milliGRAM(s) Oral at bedtime  famotidine    Tablet 20 milliGRAM(s) Oral daily  flecainide 50 milliGRAM(s) Oral every 12 hours  melatonin 3 milliGRAM(s) Oral at bedtime PRN  metoprolol tartrate 50 milliGRAM(s) Oral every 8 hours  ondansetron Injectable 4 milliGRAM(s) IV Push every 8 hours PRN  pantoprazole    Tablet 40 milliGRAM(s) Oral before breakfast  rivaroxaban 20 milliGRAM(s) Oral with dinner  senna 2 Tablet(s) Oral at bedtime  sodium chloride 0.9%. 1000 milliLiter(s) IV Continuous <Continuous>      Vital Signs Last 24 Hrs  T(C): 36.9 (25 Sep 2022 00:07), Max: 36.9 (25 Sep 2022 00:07)  T(F): 98.4 (25 Sep 2022 00:07), Max: 98.4 (25 Sep 2022 00:07)  HR: 53 (25 Sep 2022 06:07) (53 - 70)  BP: 151/54 (25 Sep 2022 05:02) (151/54 - 193/80)  BP(mean): 88 (25 Sep 2022 05:02) (88 - 129)  RR: 18 (25 Sep 2022 05:02) (16 - 19)  SpO2: 96% (25 Sep 2022 06:07) (95% - 100%)    Parameters below as of 25 Sep 2022 05:02  Patient On (Oxygen Delivery Method): room air        Neurological Examination:  General:  Appearance is consistent with chronologic age.   Cognitive/Language:  Awake, alert, and oriented to person, place, time and date.  Recent and remote memory intact.  Fund of knowledge is appropriate.  Naming, repetition and comprehension intact. Nondysarthric.    Cranial Nerves  - Eyes: Visual acuity intact, Visual fields full.  EOMI w/o nystagmus, skew or reported double vision.  PERRL.  No ptosis/weakness of eyelid closure.    - Face:  Facial sensation normal V1 - 3, no facial asymmetry.    - Ears/Nose/Throat:  Hearing grossly intact b/l to finger rub.  Palate elevates midline.  Tongue and uvula midline.   Motor examination:  (MRC grade R/L) 5/5 UE; 5/5 LE.  No observable drift. Normal tone and bulk. No tenderness, twitching, tremors or involuntary movements.  Sensory examination:  Intact to light touch and pinprick, pain, temperature and proprioception and vibration in all extremities.  Reflexes:   2+ b/l biceps, triceps, patella and achilles.  Plantar response downgoing b/l.  Jaw jerk, Jomar, clonus absent.  Cerebellum:   FTN/HKS intact.  No dysmetria.    Gait narrow based and normal.    Labs:  CBC Full  -  ( 24 Sep 2022 06:11 )  WBC Count : 8.07 K/uL  RBC Count : 3.52 M/uL  Hemoglobin : 11.5 g/dL  Hematocrit : 33.5 %  Platelet Count - Automated : 175 K/uL  Mean Cell Volume : 95.2 fL  Mean Cell Hemoglobin : 32.7 pg  Mean Cell Hemoglobin Concentration : 34.3 g/dL  Auto Neutrophil # : 5.32 K/uL  Auto Lymphocyte # : 2.01 K/uL  Auto Monocyte # : 0.56 K/uL  Auto Eosinophil # : 0.12 K/uL  Auto Basophil # : 0.05 K/uL  Auto Neutrophil % : 66.0 %  Auto Lymphocyte % : 24.9 %  Auto Monocyte % : 6.9 %  Auto Eosinophil % : 1.5 %  Auto Basophil % : 0.6 %    09-24    138  |  105  |  17  ----------------------------<  86  4.3   |  21  |  0.9    Ca    8.9      24 Sep 2022 06:11  Mg     2.0     09-24    TPro  5.9<L>  /  Alb  3.8  /  TBili  0.6  /  DBili  x   /  AST  26  /  ALT  22  /  AlkPhos  64  09-24    LIVER FUNCTIONS - ( 24 Sep 2022 06:11 )  Alb: 3.8 g/dL / Pro: 5.9 g/dL / ALK PHOS: 64 U/L / ALT: 22 U/L / AST: 26 U/L / GGT: x           PT/INR - ( 23 Sep 2022 17:09 )   PT: 12.80 sec;   INR: 1.11 ratio         PTT - ( 23 Sep 2022 17:09 )  PTT:28.7 sec      RADIOLOGY & ADDITIONAL TESTS:  RADIOLOGY & ADDITIONAL TESTS:  < from: CT Brain Perfusion Maps Stroke (09.23.22 @ 16:49) >      IMPRESSION:    Small 32 mL perfusion abnormality in the left parietal region   corresponding to occlusion of left P4 segment (3-317).    No intracranial small aneurysm.    Focal severe stenosis of right distal ICA supraclinoid segment. Focal   mild/moderate stenosis at left distal ICA supraclinoid segment.    Dr. Dandre Hernandez discussed preliminary findings with LIGIA CRUZ DO   on 9/23/2022 5:38 PM with readback.    --- End of Report ---      < end of copied text >  < from: CT Brain Stroke Protocol (09.23.22 @ 16:25) >    IMPRESSION:  No CT evidence of large acute territorial infarct.    No evidence of acute intracranial pathology. No mass effect, midline   shift or intracranial hemorrhage.    Mild chronic microvascular type changes as well as a chronic right   cerebellar infarct.

## 2022-09-26 LAB
ANION GAP SERPL CALC-SCNC: 10 MMOL/L — SIGNIFICANT CHANGE UP (ref 7–14)
BUN SERPL-MCNC: 19 MG/DL — SIGNIFICANT CHANGE UP (ref 10–20)
CALCIUM SERPL-MCNC: 9 MG/DL — SIGNIFICANT CHANGE UP (ref 8.4–10.5)
CHLORIDE SERPL-SCNC: 109 MMOL/L — SIGNIFICANT CHANGE UP (ref 98–110)
CO2 SERPL-SCNC: 24 MMOL/L — SIGNIFICANT CHANGE UP (ref 17–32)
CREAT SERPL-MCNC: 1.1 MG/DL — SIGNIFICANT CHANGE UP (ref 0.7–1.5)
EGFR: 52 ML/MIN/1.73M2 — LOW
GLUCOSE SERPL-MCNC: 85 MG/DL — SIGNIFICANT CHANGE UP (ref 70–99)
HCT VFR BLD CALC: 30.7 % — LOW (ref 37–47)
HGB BLD-MCNC: 10.7 G/DL — LOW (ref 12–16)
MAGNESIUM SERPL-MCNC: 1.9 MG/DL — SIGNIFICANT CHANGE UP (ref 1.8–2.4)
MCHC RBC-ENTMCNC: 32.3 PG — HIGH (ref 27–31)
MCHC RBC-ENTMCNC: 34.9 G/DL — SIGNIFICANT CHANGE UP (ref 32–37)
MCV RBC AUTO: 92.7 FL — SIGNIFICANT CHANGE UP (ref 81–99)
NRBC # BLD: 0 /100 WBCS — SIGNIFICANT CHANGE UP (ref 0–0)
PHOSPHATE SERPL-MCNC: 3.9 MG/DL — SIGNIFICANT CHANGE UP (ref 2.1–4.9)
PLATELET # BLD AUTO: 167 K/UL — SIGNIFICANT CHANGE UP (ref 130–400)
POTASSIUM SERPL-MCNC: 4 MMOL/L — SIGNIFICANT CHANGE UP (ref 3.5–5)
POTASSIUM SERPL-SCNC: 4 MMOL/L — SIGNIFICANT CHANGE UP (ref 3.5–5)
RBC # BLD: 3.31 M/UL — LOW (ref 4.2–5.4)
RBC # FLD: 14.9 % — HIGH (ref 11.5–14.5)
SODIUM SERPL-SCNC: 143 MMOL/L — SIGNIFICANT CHANGE UP (ref 135–146)
WBC # BLD: 6.15 K/UL — SIGNIFICANT CHANGE UP (ref 4.8–10.8)
WBC # FLD AUTO: 6.15 K/UL — SIGNIFICANT CHANGE UP (ref 4.8–10.8)

## 2022-09-26 PROCEDURE — 70450 CT HEAD/BRAIN W/O DYE: CPT | Mod: 26

## 2022-09-26 PROCEDURE — 99232 SBSQ HOSP IP/OBS MODERATE 35: CPT

## 2022-09-26 RX ORDER — ASPIRIN/CALCIUM CARB/MAGNESIUM 324 MG
81 TABLET ORAL DAILY
Refills: 0 | Status: DISCONTINUED | OUTPATIENT
Start: 2022-09-26 | End: 2022-09-27

## 2022-09-26 RX ORDER — ATORVASTATIN CALCIUM 80 MG/1
10 TABLET, FILM COATED ORAL AT BEDTIME
Refills: 0 | Status: DISCONTINUED | OUTPATIENT
Start: 2022-09-26 | End: 2022-09-27

## 2022-09-26 RX ADMIN — Medication 81 MILLIGRAM(S): at 14:20

## 2022-09-26 RX ADMIN — Medication 50 MILLIGRAM(S): at 22:32

## 2022-09-26 RX ADMIN — Medication 50 MILLIGRAM(S): at 05:58

## 2022-09-26 RX ADMIN — Medication 650 MILLIGRAM(S): at 00:26

## 2022-09-26 RX ADMIN — ATORVASTATIN CALCIUM 10 MILLIGRAM(S): 80 TABLET, FILM COATED ORAL at 22:32

## 2022-09-26 RX ADMIN — RIVAROXABAN 20 MILLIGRAM(S): KIT at 17:18

## 2022-09-26 RX ADMIN — Medication 50 MILLIGRAM(S): at 14:20

## 2022-09-26 RX ADMIN — FAMOTIDINE 20 MILLIGRAM(S): 10 INJECTION INTRAVENOUS at 12:25

## 2022-09-26 RX ADMIN — Medication 50 MILLIGRAM(S): at 17:18

## 2022-09-26 RX ADMIN — Medication 650 MILLIGRAM(S): at 00:50

## 2022-09-26 NOTE — PROGRESS NOTE ADULT - ASSESSMENT
76Y female with PMHx of Afib on Xarelto, HTN, HLD, PPM, recent admission for hyponatremia BIBEMS for AMS. Patient was first in [facility] ED -> NIHSS 6 for aphasia and confusion-> transferred to [place] ED -> imaging done -> Small 32 mL perfusion abnormality in the left parietal region corresponding to occlusion of left P4 segment but NIHSS was 0 and patient not a candidate for TPA or NI. admitted to stroke unit for further work up and evaluations:  -LKW 10 pm as per daughter who talked to her over the phone.  Admission NIHSS 6 -> transferred to [place] ED- NIHSS- 0  -Extracranial carotid stenosis   -TIA      #R/o Acute ischemic stroke vs TIA, Pt with Hx of Afib on Xarelto, missed one dose the night before:   - no sepsis POA  - Stroke Core measures- HGBA1C - 5.6  - On statin  - Augment SBP since exam improved at elevated .  - CTH -> negative for acute findings.   - CTA head/neck -> Focal severe stenosis of right distal ICA supraclinoid segment. Focal mild/moderate stenosis at left distal ICA supraclinoid segment.   - CTP-Small 32 mL perfusion abnormality in the left parietal region corresponding to occlusion of left P4 segment   - not a candidate for tpa or IA  - q2 neurochecks  - Permissive HTN SBP goal 150 - 200, w ICA stenosis   lopressor with parameters  - Q2 neuro and vital check for the next 48 hours.   - Resume Xarelto   - MRI brain non contrast, Pt has pacemaker, copy of the card in the chart.   - TTE with bubble, performed, reported significant findings, although no PFO.   - a1c 5.6  - LDL 46, C/w home mod dose statin  - PT/OT/ST/rehab eval   - S/S evaled and passed  - NI consulted regarding CTA findings.   - B/l Carotid duplex requested.     #Chronic Afib  - MRI compatability for Pacemaker  - Interrogate  PM  - Maintain electrolytes Mg - > 2.0; Phos - > 3.0 and K - > 4.0  - Echo as above  - s/p ablation 2x (2011 and 2012)  - resume Xarelto tomorrow  - c/w flecainide, metoprolol tartrate 50 mg TID  - Cardiologist is Dr. Ng    #Hs  of pacemaker   - [employer]), copy of the card is in the chart.     # Hx minimally invasive mitral valve repair  - in 2003    #hyperlipidemia, improved, LDL 46  - on rosuvastatin at home, c/w atorvastatin while in patient- mod dose     #MISC  DVT PPX Xarelto  GI PPX pepcid (home med)  DIET soft and bite sized until S&S eval  CODE FULL   Dispo: Stroke unit, active. Independent per PT, likely home, pending clinical course.    76Y female with PMHx of Afib on Xarelto, HTN, HLD, PPM, recent admission for hyponatremia BIBEMS for AMS. Patient was first in [facility] ED -> NIHSS 6 for aphasia and confusion-> transferred to [place] ED -> imaging done -> Small 32 mL perfusion abnormality in the left parietal region corresponding to occlusion of left P4 segment but NIHSS was 0 and patient not a candidate for TPA or NI. admitted to stroke unit for further work up and evaluations:  -LKW 10 pm as per daughter who talked to her over the phone.  Admission NIHSS 6 -> transferred to [place] ED- NIHSS- 0  -Extracranial carotid stenosis   -TIA    #Likely transient ischemic attack in left ICA territory of undetermined etiology  - Pt with Hx of Afib on Xarelto, missed one dose the night before.  - no sepsis POA  - Stroke Core measures  - A1C - 5.6  - On statin  - Augment SBP since exam improved at elevated .  - CTH -> negative for acute findings.   - CTA head/neck -> Focal severe stenosis of right distal ICA supraclinoid segment. Focal mild/moderate stenosis at left distal ICA supraclinoid segment.   - CTP-Small 32 mL perfusion abnormality in the left parietal region corresponding to occlusion of left P4 segment   - not a candidate for tpa or IA  - q2 neurochecks  - Permissive HTN SBP goal 150 - 200, w ICA stenosis   lopressor with parameters  - Q2 neuro and vital check for the next 48 hours.   - Resumed Xarelto and added AAS  - MRI brain non contrast, Pt has pacemaker, copy of the card in the chart. Ordered a non-contrast head CT.  - TTE with bubble, performed, reported significant findings, although no PFO.   - LDL 46, C/w home mod dose statin  - PT/OT/ST/rehab eval   - S/S evaled and passed  - NI consulted regarding CTA findings.   - B/l Carotid duplex requested.     #Chronic Afib  - MRI compatability for Pacemaker  - Interrogate  PM  - Maintain electrolytes Mg - > 2.0; Phos - > 3.0 and K - > 4.0  - Echo as above  - s/p ablation 2x (2011 and 2012)  - c/w on Xarelto and started on AAS 81mg QD  - c/w flecainide, metoprolol tartrate 50 mg TID  - Cardiologist is Dr. Ng    #Hs of pacemaker   - [employer]), copy of the card is in the chart.   - Might be available tomorrow.  -     # Hx minimally invasive mitral valve repair  - in 2003    #hyperlipidemia, improved, LDL 46  - on rosuvastatin at home, c/w atorvastatin while in patient- mod dose     #MISC  DVT PPX Xarelto  GI PPX pepcid (home med)  DIET soft and bite sized until S&S eval  CODE FULL   Dispo: Stroke unit, active. Independent per PT, likely home, pending clinical course.

## 2022-09-26 NOTE — PROGRESS NOTE ADULT - ASSESSMENT
76Y female with PMHx of Afib on Xarelto, HTN, HLD, PPM, recent admission for hyponatremia BIBEMS for AMS. Patient was first in [facility] ED -> NIHSS 6 for aphasia and confusion-> transferred to [place] ED -> imaging done -> Small 32 mL perfusion abnormality in the left parietal region corresponding to occlusion of left P4 segment but NIHSS was 0 and patient not a candidate for TPA or NI. admitted to stroke unit for further work up and evaluations:     #Acute ischemic stroke vs TIA, Pt with Hx of Afib on Xarelto, missed one dose the night before:   #Intracranial stenosis  - LKW 10 pm as per daughter who talked to her over the phone.  - first in [facility] ED -> NIHSS 6 -> transferred to [place] ED  - CTH -> negative for acute findings.   - CTA head/neck -> Focal severe stenosis of right distal ICA supraclinoid segment. Focal mild/moderate stenosis at left distal ICA supraclinoid segment.   - CTP-Small 32 mL perfusion abnormality in the left parietal region corresponding to occlusion of left P4 segment   - NIHSS improved to 0  - not a candidate for tpa or IA  - q2 neurochecks  - Permissive HTN SBP goal 160 - 200, w ICA stenosis.    - Resumed Xarelto   - MRI brain non contrast, Pt has pacemaker, copy of the card in the chart (Awaiting [employer] Rep)  - TTE: nl EF  - a1c 5.6  - LDL 46, C/w home meds  - PT eval, outpatient PT.   - NI consulted regarding CTA findings.   -consider adding ASA 81    #Chronic Afib  - s/p ablation 2x (2011 and 2012)  - resume Xarelto as per neuro  - c/w flecainide, metoprolol tartrate 50 mg TID (can give lower dose temporarily  if needed for permissive HTN)  - Cardiologist is Dr. Ng    #HTN  permissive HTN for now    #Hs  of pacemaker   - [employer]), copy of the card is in the chart.     # Hx minimally invasive mitral valve repair  - in 2003    #hyperlipidemia, LDL 46  - on rosuvastatin at home, c/w atorvastatin while in patient    #MISC  DVT PPX Xarelto  GI PPX Protonix  CODE FULL     #Progress Note Handoff  Pending: Consults____Clinical improvement and stability__x___Tests__MRI____PT____x____  Pt/Family discussion: Pt informed and agrees with the current plan  Disposition: Home___    My note supersedes the residents note should a discrepancy arise.    Chart and notes personally reviewed.  Care Discussed with Consultants/Other Providers/ Housestaff [ x] YES [ ] NO   Radiology, labs, old records personally reviewed.    d/w Housestaff, nursing, case mgmt, neuro

## 2022-09-26 NOTE — CHART NOTE - NSCHARTNOTEFT_GEN_A_CORE
Neuroendovascular team consulted per neurovascular/stroke team for "possible TIA, LUE, aphasia, confusion. CTA focal severe stenosis of the right distal ICA supraclinoid segment. Focal mild/moderate stenosis at the left distal ICA supraclinoid segment. Appreciate neurointervention recs. NIH 0 currently".     Per Dr. Alvarez, it should be determined by the neurovascular/stroke team if the patient's symptoms are attributable to the current CTA findings, and if so, would management change / would the patient benefit from neuroendovascular intervention / recanalization.     Patient is currently pending MR studies, carotid duplex.     Please reconsult neuroendovascular once these criteria have been discussed.     Discussed with Dr. Alvarez  [phone] Neuroendovascular team consulted per neurovascular/stroke team for "possible TIA, LUE, aphasia, confusion. CTA focal severe stenosis of the right distal ICA supraclinoid segment. Focal mild/moderate stenosis at the left distal ICA supraclinoid segment. Appreciate neurointervention recs. NIH 0 currently".     Per Dr. Alvarez, it should be determined by the neurovascular/stroke team if the patient's symptoms are attributable to the current CTA findings, and if so, would management change / would the patient benefit from neuroendovascular intervention / recanalization.     Patient is currently pending MR studies, carotid duplex.     Please reconsult neuroendovascular once these criteria have been discussed.     Discussed with Dr. Alvarez  [phone]    -NI TEAM CONSULTED FOR THE ABOVE-MENTIONED CTA FINDINGS. THE FIRST STEP IS THAT THE NV/STROKE TEAM NEED TO EVALUATE THE PATIENT AND BASED ON THEIR EXPERTISE DETERMINE WHETHER THESE CTA FINDINGS ARE ATTRIBUTABLE TO THE PATIENT'S SIGNS AND SYMPTOMS OR NOT.  TAJ.

## 2022-09-26 NOTE — PROGRESS NOTE ADULT - SUBJECTIVE AND OBJECTIVE BOX
NEUROLOGY CONSULT PROGRESS NOTE    INTERVAL HISTORY:      REVIEW OF SYSTEMS:  As per HPI, otherwise negative for Constitutional, Eyes, Ears/Nose/Mouth/Throat, Neck, Cardiovascular, Respiratory, Gastrointestinal, Genitourinary, Skin, Endocrine, Musculoskeletal, Psychiatric, and Hematologic/Lymphatic.    MEDICATIONS:  acetaminophen     Tablet .. 650 milliGRAM(s) Oral every 6 hours PRN  aluminum hydroxide/magnesium hydroxide/simethicone Suspension 30 milliLiter(s) Oral every 4 hours PRN  atorvastatin 10 milliGRAM(s) Oral at bedtime  famotidine    Tablet 20 milliGRAM(s) Oral daily  flecainide 50 milliGRAM(s) Oral every 12 hours  melatonin 3 milliGRAM(s) Oral at bedtime PRN  metoprolol tartrate 50 milliGRAM(s) Oral every 8 hours  ondansetron Injectable 4 milliGRAM(s) IV Push every 8 hours PRN  rivaroxaban 20 milliGRAM(s) Oral with dinner  senna 2 Tablet(s) Oral at bedtime  sodium chloride 0.9%. 1000 milliLiter(s) IV Continuous <Continuous>    VITAL SIGNS:  Vital Signs Last 24 Hrs  T(C): 36.8 (26 Sep 2022 05:00), Max: 36.8 (26 Sep 2022 05:00)  T(F): 98.2 (26 Sep 2022 05:00), Max: 98.2 (26 Sep 2022 05:00)  HR: 68 (26 Sep 2022 09:00) (60 - 68)  BP: 154/68 (26 Sep 2022 09:00) (146/70 - 180/85)  BP(mean): 100 (26 Sep 2022 09:00) (92 - 134)  RR: 18 (26 Sep 2022 09:00) (16 - 18)  SpO2: 99% (26 Sep 2022 09:00) (95% - 100%)    Parameters below as of 26 Sep 2022 09:00  Patient On (Oxygen Delivery Method): room air        PHYSICAL EXAMINATION:  Constitutional: WDWN; NAD  Eyes: anicteric sclera  Cardiovascular: +S1/S2, RRR; no M/R/G  Neurologic:  - Mental Status:  AAOx3; speech is fluent with intact naming, repetition, and comprehension  - Cranial Nerves II-XII:    II:  PERRLA; visual fields are full to confrontation  III, IV, VI:  EOMI, no nystagmus  V:  facial sensation is intact in the V1-V3 distribution bilaterally.  VII:  face is symmetric with normal eye closure and smile  VIII:  hearing is intact to finger rub  IX, X:  uvula is midline and soft palate rises symmetrically  XI:  head turning and shoulder shrug are intact bilaterally  XII:  tongue protrudes in the midline  - Motor:  strength is 5/5 throughout; normal muscle bulk and tone throughout; no pronator drift  - Reflexes:  2+ and symmetric at the biceps, triceps, brachioradialis, knees, and ankles;  plantar reflexes downgoing bilaterally  - Sensory:  intact to light touch, pin prick, vibration, and joint-position sense throughout  - Coordination:  finger-nose-finger and heel-knee-shin intact without dysmetria; rapid alternating hand movements intact  - Gait:  normal steps, base, arm swing, and turning; heel and toe walking are normal; tandem gait is normal; Romberg testing is negative    LABS:                          10.7   6.15  )-----------( 167      ( 26 Sep 2022 07:38 )             30.7     09-26    143  |  109  |  19  ----------------------------<  85  4.0   |  24  |  1.1    Ca    9.0      26 Sep 2022 07:38  Phos  3.9     09-26  Mg     1.9     09-26   INTERVAL HISTORY: No overnight events.     REVIEW OF SYSTEMS:  As per HPI, otherwise negative for Constitutional, Eyes, Ears/Nose/Mouth/Throat, Neck, Cardiovascular, Respiratory, Gastrointestinal, Genitourinary, Skin, Endocrine, Musculoskeletal, Psychiatric, and Hematologic/Lymphatic.    MEDICATIONS:  acetaminophen     Tablet .. 650 milliGRAM(s) Oral every 6 hours PRN  aluminum hydroxide/magnesium hydroxide/simethicone Suspension 30 milliLiter(s) Oral every 4 hours PRN  atorvastatin 10 milliGRAM(s) Oral at bedtime  famotidine    Tablet 20 milliGRAM(s) Oral daily  flecainide 50 milliGRAM(s) Oral every 12 hours  melatonin 3 milliGRAM(s) Oral at bedtime PRN  metoprolol tartrate 50 milliGRAM(s) Oral every 8 hours  ondansetron Injectable 4 milliGRAM(s) IV Push every 8 hours PRN  rivaroxaban 20 milliGRAM(s) Oral with dinner  senna 2 Tablet(s) Oral at bedtime  sodium chloride 0.9%. 1000 milliLiter(s) IV Continuous <Continuous>    VITAL SIGNS:  Vital Signs Last 24 Hrs  T(C): 36.8 (26 Sep 2022 05:00), Max: 36.8 (26 Sep 2022 05:00)  T(F): 98.2 (26 Sep 2022 05:00), Max: 98.2 (26 Sep 2022 05:00)  HR: 68 (26 Sep 2022 09:00) (60 - 68)  BP: 154/68 (26 Sep 2022 09:00) (146/70 - 180/85)  BP(mean): 100 (26 Sep 2022 09:00) (92 - 134)  RR: 18 (26 Sep 2022 09:00) (16 - 18)  SpO2: 99% (26 Sep 2022 09:00) (95% - 100%)    Parameters below as of 26 Sep 2022 09:00  Patient On (Oxygen Delivery Method): room air    PHYSICAL EXAMINATION:  Constitutional: WDWN; NAD  Eyes: anicteric sclera  Cardiovascular: +S1/S2, RRR; no M/R/G  Neurological:    Cognitive/Language:  Awake, alert, and oriented to person, place, time and date.  Recent and remote memory intact.  Fund of knowledge is appropriate.  Naming, repetition and comprehension intact. Nondysarthric.    Cranial Nerves  - Eyes: Visual acuity intact, Visual fields full.  EOMI w/o nystagmus, skew or reported double vision.  PERRL.  No ptosis/weakness of eyelid closure.    - Face:  Facial sensation normal V1 - 3, no facial asymmetry.    - Ears/Nose/Throat:  Hearing grossly intact b/l to finger rub.  Palate elevates midline.  Tongue and uvula midline.   Motor examination:  (MRC grade R/L) 5/5 UE; 5/5 LE.  No observable drift. Normal tone and bulk. No tenderness, twitching, tremors or involuntary movements.  Sensory examination:  Intact to light touch and pinprick, pain, temperature and proprioception and vibration in all extremities.  Reflexes:   2+ b/l biceps, triceps, patella and achilles.  Plantar response downgoing b/l.  Jaw jerk, Jomar, clonus absent.  Cerebellum:   FTN/HKS intact.  No dysmetria.    Gait narrow based and normal.    LABS:                          10.7   6.15  )-----------( 167      ( 26 Sep 2022 07:38 )             30.7     09-26    143  |  109  |  19  ----------------------------<  85  4.0   |  24  |  1.1    Ca    9.0      26 Sep 2022 07:38  Phos  3.9     09-26  Mg     1.9     09-26   INTERVAL HISTORY: No overnight events. Was able to walk around normally. Pt says she came back to her baseline.     REVIEW OF SYSTEMS:  As per HPI, otherwise negative for Constitutional, Eyes, Ears/Nose/Mouth/Throat, Neck, Cardiovascular, Respiratory, Gastrointestinal, Genitourinary, Skin, Endocrine, Musculoskeletal, Psychiatric, and Hematologic/Lymphatic.    MEDICATIONS:  acetaminophen     Tablet .. 650 milliGRAM(s) Oral every 6 hours PRN  aluminum hydroxide/magnesium hydroxide/simethicone Suspension 30 milliLiter(s) Oral every 4 hours PRN  atorvastatin 10 milliGRAM(s) Oral at bedtime  famotidine    Tablet 20 milliGRAM(s) Oral daily  flecainide 50 milliGRAM(s) Oral every 12 hours  melatonin 3 milliGRAM(s) Oral at bedtime PRN  metoprolol tartrate 50 milliGRAM(s) Oral every 8 hours  ondansetron Injectable 4 milliGRAM(s) IV Push every 8 hours PRN  rivaroxaban 20 milliGRAM(s) Oral with dinner  senna 2 Tablet(s) Oral at bedtime  sodium chloride 0.9%. 1000 milliLiter(s) IV Continuous <Continuous>    VITAL SIGNS:  Vital Signs Last 24 Hrs  T(C): 36.8 (26 Sep 2022 05:00), Max: 36.8 (26 Sep 2022 05:00)  T(F): 98.2 (26 Sep 2022 05:00), Max: 98.2 (26 Sep 2022 05:00)  HR: 68 (26 Sep 2022 09:00) (60 - 68)  BP: 154/68 (26 Sep 2022 09:00) (146/70 - 180/85)  BP(mean): 100 (26 Sep 2022 09:00) (92 - 134)  RR: 18 (26 Sep 2022 09:00) (16 - 18)  SpO2: 99% (26 Sep 2022 09:00) (95% - 100%)    Parameters below as of 26 Sep 2022 09:00  Patient On (Oxygen Delivery Method): room air    PHYSICAL EXAMINATION:  Constitutional: WDWN; NAD  Eyes: anicteric sclera  Cardiovascular: +S1/S2, RRR; no M/R/G  Neurological:    Cognitive/Language:  Awake, alert, and oriented to person, place, time and date.  Recent and remote memory intact.  Fund of knowledge is appropriate.  Naming, repetition and comprehension intact. Nondysarthric.    Cranial Nerves  - Eyes: Visual acuity intact, Visual fields full.  EOMI w/o nystagmus, skew or reported double vision.  PERRL.  No ptosis/weakness of eyelid closure.    - Face:  Facial sensation normal V1 - 3, no facial asymmetry.    - Ears/Nose/Throat:  Hearing grossly intact b/l to finger rub.  Palate elevates midline.  Tongue and uvula midline.   Motor examination:  (MRC grade R/L) 5/5 UE; 5/5 LE.  No observable drift. Normal tone and bulk. No tenderness, twitching, tremors or involuntary movements.  Sensory examination:  Intact to light touch and pinprick, pain, temperature and proprioception and vibration in all extremities.  Reflexes:   2+ b/l biceps, triceps, patella and achilles.  Plantar response downgoing b/l.  Jaw jerk, Jomar, clonus absent.  Cerebellum:   FTN/HKS intact.  No dysmetria.    Gait narrow based and normal.    LABS:                          10.7   6.15  )-----------( 167      ( 26 Sep 2022 07:38 )             30.7     09-26    143  |  109  |  19  ----------------------------<  85  4.0   |  24  |  1.1    Ca    9.0      26 Sep 2022 07:38  Phos  3.9     09-26  Mg     1.9     09-26

## 2022-09-26 NOTE — PROGRESS NOTE ADULT - SUBJECTIVE AND OBJECTIVE BOX
CURTIS WATERS  76y  Female    Patient is a 76y old  Female who presents with a chief complaint of AMS (23 Sep 2022 20:04)      HPI:   76Y female with PMHx of Afib on Xarelto, HTN, HLD, PPM, minimally invasive mitral valve repair (2003) recent admission for hyponatremia BIBEMS for the above chief complaint. history goes back to around 2:30 pm after running errands when patient mentioned feeling "funny". she saw her neighbors pulling in and she told them that she was not feeling well. neighbors spoke to daughter who spoke to her and noticed that she was confused -> daughter called her  who went to see her. by that time neighbors already called the ambulance who brought the patient to the [facility] ED where she was aphasic with a NHSS of 6. to note thatPatient lives alone. Patient did not take her Xarelto dose and her last dose was 9/21/22. [facility] ED discussed with Dr Garcia at time of stroke code. -> Patient was transferred to [facility] for stroke workup.     VITALS:   T(C): 36.4 (09-23-22 @ 18:03), Max: 36.6 (09-23-22 @ 16:14)  HR: 60 (09-23-22 @ 18:12) (60 - 81)  BP: 189/88 (09-23-22 @ 18:12) (169/73 - 189/88)  RR: 16 (09-23-22 @ 18:12) (16 - 16)  SpO2: 99% (09-23-22 @ 18:12) (97% - 99%)      in the ED, vitals were significant for HTN. labs were unremarkable. neurology consulted and stroke workup initiated. CTH-negative for acute findings. CTA head/neck-Focal severe stenosis of right distal ICA supraclinoid segment. Focal mild/moderate stenosis at left distal ICA supraclinoid segment. CTP-Small 32 mL perfusion abnormality in the left parietal region corresponding to occlusion of left P4 segment (3-317). Patient's NIH improved from NIH 5--->2--->0. Patient is not a tpa or IA candidate (as no LVO). admitted to stroke unit for observation   (23 Sep 2022 20:04)    Additional Hx: pt also reports RUE weakness yesterday during the episode.    S: Patient was examined and seen at bedside. This morning pt is resting comfortably in bed and reports no new issues or overnight events. No complaints. No new Sx.  Denies CP, SOB, N/V/D/C/AP, cough, F, chills, dizziness, new focal weakness, HA, vision changes, dysuria, or urinary symptoms, blood in stool.  ROS: all other systems reviewed and are negative    PAST MEDICAL & SURGICAL HISTORY:  History of atrial fibrillation      H/O mitral valve repair  2003      Hypertension      Hyperlipidemia      Overactive bladder      Cardiac pacemaker      H/O mitral valve repair      S/P ablation of atrial fibrillation  2x (2011 and 2012)      General: NAD. Looks stated age.  HEENT: clean oropharynx, EOMI, no LAD  Neck: trachea midline, no thyromegaly  CV: nl S1 S2; no m/r/g  Resp: decreased breath sounds at base  GI: NT/ND/S +BS  MS: no clubbing/cyanosis/edema, +pulses  Neuro: motor, sensory intact; + reflexes  Skin: no rashes, nl turgor  Psychiatric: AA0x3 w/ intact insight and judgement    tele: afib, nonspecific changes (on my own evaluation of tele monitor)            MEDICATIONS  (STANDING):  aspirin  chewable 81 milliGRAM(s) Oral daily  atorvastatin 10 milliGRAM(s) Oral at bedtime  famotidine    Tablet 20 milliGRAM(s) Oral daily  flecainide 50 milliGRAM(s) Oral every 12 hours  metoprolol tartrate 50 milliGRAM(s) Oral every 8 hours  rivaroxaban 20 milliGRAM(s) Oral with dinner  senna 2 Tablet(s) Oral at bedtime  sodium chloride 0.9%. 1000 milliLiter(s) (44 mL/Hr) IV Continuous <Continuous>    MEDICATIONS  (PRN):  acetaminophen     Tablet .. 650 milliGRAM(s) Oral every 6 hours PRN Temp greater or equal to 38C (100.4F), Mild Pain (1 - 3)  aluminum hydroxide/magnesium hydroxide/simethicone Suspension 30 milliLiter(s) Oral every 4 hours PRN Dyspepsia  melatonin 3 milliGRAM(s) Oral at bedtime PRN Insomnia  ondansetron Injectable 4 milliGRAM(s) IV Push every 8 hours PRN Nausea and/or Vomiting    Home Medications:  famotidine 20 mg oral tablet: 1 tab(s) orally once a day (23 Sep 2022 21:00)  flecainide 50 mg oral tablet: 1 tab(s) orally every 12 hours (23 Sep 2022 21:00)  Metoprolol Tartrate 50 mg oral tablet: 1 tab(s) orally 3 times a day (23 Sep 2022 21:00)  rosuvastatin 5 mg oral tablet: 1 tab(s) orally once a day (23 Sep 2022 21:00)  Vitamin D3 125 mcg (5000 intl units) oral tablet: 1 tab(s) orally once a day (23 Sep 2022 21:00)  Xarelto 20 mg oral tablet: 1 tab(s) orally once a day (in the evening) (23 Sep 2022 21:00)    Vital Signs Last 24 Hrs  T(C): 36.8 (26 Sep 2022 05:00), Max: 36.8 (26 Sep 2022 05:00)  T(F): 98.2 (26 Sep 2022 05:00), Max: 98.2 (26 Sep 2022 05:00)  HR: 62 (26 Sep 2022 11:30) (60 - 68)  BP: 179/81 (26 Sep 2022 11:30) (146/70 - 180/85)  BP(mean): 130 (26 Sep 2022 11:30) (92 - 130)  RR: 18 (26 Sep 2022 11:30) (16 - 18)  SpO2: 99% (26 Sep 2022 11:30) (95% - 99%)    Parameters below as of 26 Sep 2022 11:30  Patient On (Oxygen Delivery Method): room air      CAPILLARY BLOOD GLUCOSE        LABS:                        10.7   6.15  )-----------( 167      ( 26 Sep 2022 07:38 )             30.7     09-26    143  |  109  |  19  ----------------------------<  85  4.0   |  24  |  1.1    Ca    9.0      26 Sep 2022 07:38  Phos  3.9     09-26  Mg     1.9     09-26                        Consultant Notes Reviewed:  [x ] YES  [ ] NO  Care Discussed with Consultants/Other Providers/ Housestaff [ x] YES  [ ] NO  Radiology, labs, new studies personally reviewed.

## 2022-09-27 ENCOUNTER — TRANSCRIPTION ENCOUNTER (OUTPATIENT)
Age: 77
End: 2022-09-27

## 2022-09-27 VITALS — HEIGHT: 65 IN | WEIGHT: 140.43 LBS

## 2022-09-27 LAB
ANION GAP SERPL CALC-SCNC: 9 MMOL/L — SIGNIFICANT CHANGE UP (ref 7–14)
BUN SERPL-MCNC: 21 MG/DL — HIGH (ref 10–20)
CALCIUM SERPL-MCNC: 9.2 MG/DL — SIGNIFICANT CHANGE UP (ref 8.4–10.5)
CHLORIDE SERPL-SCNC: 104 MMOL/L — SIGNIFICANT CHANGE UP (ref 98–110)
CO2 SERPL-SCNC: 25 MMOL/L — SIGNIFICANT CHANGE UP (ref 17–32)
CREAT SERPL-MCNC: 1.1 MG/DL — SIGNIFICANT CHANGE UP (ref 0.7–1.5)
EGFR: 52 ML/MIN/1.73M2 — LOW
GLUCOSE SERPL-MCNC: 93 MG/DL — SIGNIFICANT CHANGE UP (ref 70–99)
HCT VFR BLD CALC: 32.8 % — LOW (ref 37–47)
HGB BLD-MCNC: 11 G/DL — LOW (ref 12–16)
MAGNESIUM SERPL-MCNC: 1.9 MG/DL — SIGNIFICANT CHANGE UP (ref 1.8–2.4)
MCHC RBC-ENTMCNC: 31.6 PG — HIGH (ref 27–31)
MCHC RBC-ENTMCNC: 33.5 G/DL — SIGNIFICANT CHANGE UP (ref 32–37)
MCV RBC AUTO: 94.3 FL — SIGNIFICANT CHANGE UP (ref 81–99)
NRBC # BLD: 0 /100 WBCS — SIGNIFICANT CHANGE UP (ref 0–0)
PHOSPHATE SERPL-MCNC: 4.5 MG/DL — SIGNIFICANT CHANGE UP (ref 2.1–4.9)
PLATELET # BLD AUTO: 166 K/UL — SIGNIFICANT CHANGE UP (ref 130–400)
POTASSIUM SERPL-MCNC: 4.3 MMOL/L — SIGNIFICANT CHANGE UP (ref 3.5–5)
POTASSIUM SERPL-SCNC: 4.3 MMOL/L — SIGNIFICANT CHANGE UP (ref 3.5–5)
RBC # BLD: 3.48 M/UL — LOW (ref 4.2–5.4)
RBC # FLD: 14.8 % — HIGH (ref 11.5–14.5)
SODIUM SERPL-SCNC: 138 MMOL/L — SIGNIFICANT CHANGE UP (ref 135–146)
WBC # BLD: 6.66 K/UL — SIGNIFICANT CHANGE UP (ref 4.8–10.8)
WBC # FLD AUTO: 6.66 K/UL — SIGNIFICANT CHANGE UP (ref 4.8–10.8)

## 2022-09-27 PROCEDURE — 99238 HOSP IP/OBS DSCHRG MGMT 30/<: CPT

## 2022-09-27 PROCEDURE — 99233 SBSQ HOSP IP/OBS HIGH 50: CPT

## 2022-09-27 PROCEDURE — 70551 MRI BRAIN STEM W/O DYE: CPT | Mod: 26

## 2022-09-27 RX ORDER — ATORVASTATIN CALCIUM 80 MG/1
1 TABLET, FILM COATED ORAL
Qty: 1 | Refills: 0
Start: 2022-09-27

## 2022-09-27 RX ORDER — ASPIRIN/CALCIUM CARB/MAGNESIUM 324 MG
1 TABLET ORAL
Qty: 1 | Refills: 0
Start: 2022-09-27

## 2022-09-27 RX ORDER — ROSUVASTATIN CALCIUM 5 MG/1
1 TABLET ORAL
Qty: 0 | Refills: 0 | DISCHARGE

## 2022-09-27 RX ADMIN — Medication 81 MILLIGRAM(S): at 11:58

## 2022-09-27 RX ADMIN — Medication 50 MILLIGRAM(S): at 17:09

## 2022-09-27 RX ADMIN — FAMOTIDINE 20 MILLIGRAM(S): 10 INJECTION INTRAVENOUS at 11:58

## 2022-09-27 RX ADMIN — RIVAROXABAN 20 MILLIGRAM(S): KIT at 17:09

## 2022-09-27 RX ADMIN — Medication 50 MILLIGRAM(S): at 13:56

## 2022-09-27 NOTE — PROGRESS NOTE ADULT - SUBJECTIVE AND OBJECTIVE BOX
CURTIS WATERS  76y  Female    Patient is a 76y old  Female who presents with a chief complaint of AMS (23 Sep 2022 20:04)      HPI:   76Y female with PMHx of Afib on Xarelto, HTN, HLD, PPM, minimally invasive mitral valve repair (2003) recent admission for hyponatremia BIBEMS for the above chief complaint. history goes back to around 2:30 pm after running errands when patient mentioned feeling "funny". she saw her neighbors pulling in and she told them that she was not feeling well. neighbors spoke to daughter who spoke to her and noticed that she was confused -> daughter called her  who went to see her. by that time neighbors already called the ambulance who brought the patient to the [facility] ED where she was aphasic with a NHSS of 6. to note thatPatient lives alone. Patient did not take her Xarelto dose and her last dose was 9/21/22. [facility] ED discussed with Dr Garcia at time of stroke code. -> Patient was transferred to [facility] for stroke workup.     VITALS:   T(C): 36.4 (09-23-22 @ 18:03), Max: 36.6 (09-23-22 @ 16:14)  HR: 60 (09-23-22 @ 18:12) (60 - 81)  BP: 189/88 (09-23-22 @ 18:12) (169/73 - 189/88)  RR: 16 (09-23-22 @ 18:12) (16 - 16)  SpO2: 99% (09-23-22 @ 18:12) (97% - 99%)      in the ED, vitals were significant for HTN. labs were unremarkable. neurology consulted and stroke workup initiated. CTH-negative for acute findings. CTA head/neck-Focal severe stenosis of right distal ICA supraclinoid segment. Focal mild/moderate stenosis at left distal ICA supraclinoid segment. CTP-Small 32 mL perfusion abnormality in the left parietal region corresponding to occlusion of left P4 segment (3-317). Patient's NIH improved from NIH 5--->2--->0. Patient is not a tpa or IA candidate (as no LVO). admitted to stroke unit for observation   (23 Sep 2022 20:04)    Additional Hx: pt also reports RUE weakness yesterday during the episode.    S: Patient was examined and seen at bedside. This morning pt is resting comfortably in bed and reports no new issues or overnight events. No complaints. No new Sx. MRI w/ acute strokes.  Denies CP, SOB, N/V/D/C/AP, cough, F, chills, dizziness, new focal weakness, HA, vision changes, dysuria, or urinary symptoms, blood in stool.  ROS: all other systems reviewed and are negative    PAST MEDICAL & SURGICAL HISTORY:  History of atrial fibrillation      H/O mitral valve repair  2003      Hypertension      Hyperlipidemia      Overactive bladder      Cardiac pacemaker      H/O mitral valve repair      S/P ablation of atrial fibrillation  2x (2011 and 2012)      General: NAD. Looks stated age.  HEENT: clean oropharynx, EOMI, no LAD  Neck: trachea midline, no thyromegaly  CV: nl S1 S2; no m/r/g  Resp: decreased breath sounds at base  GI: NT/ND/S +BS  MS: no clubbing/cyanosis/edema, +pulses  Neuro: motor, sensory intact; + reflexes  Skin: no rashes, nl turgor  Psychiatric: AA0x3 w/ intact insight and judgement    tele: afib, nonspecific changes (on my own evaluation of tele monitor)              MEDICATIONS  (STANDING):  aspirin  chewable 81 milliGRAM(s) Oral daily  atorvastatin 10 milliGRAM(s) Oral at bedtime  famotidine    Tablet 20 milliGRAM(s) Oral daily  flecainide 50 milliGRAM(s) Oral every 12 hours  metoprolol tartrate 50 milliGRAM(s) Oral every 8 hours  rivaroxaban 20 milliGRAM(s) Oral with dinner  senna 2 Tablet(s) Oral at bedtime  sodium chloride 0.9%. 1000 milliLiter(s) (44 mL/Hr) IV Continuous <Continuous>    MEDICATIONS  (PRN):  acetaminophen     Tablet .. 650 milliGRAM(s) Oral every 6 hours PRN Temp greater or equal to 38C (100.4F), Mild Pain (1 - 3)  aluminum hydroxide/magnesium hydroxide/simethicone Suspension 30 milliLiter(s) Oral every 4 hours PRN Dyspepsia  melatonin 3 milliGRAM(s) Oral at bedtime PRN Insomnia  ondansetron Injectable 4 milliGRAM(s) IV Push every 8 hours PRN Nausea and/or Vomiting    Home Medications:  famotidine 20 mg oral tablet: 1 tab(s) orally once a day (23 Sep 2022 21:00)  flecainide 50 mg oral tablet: 1 tab(s) orally every 12 hours (23 Sep 2022 21:00)  Metoprolol Tartrate 50 mg oral tablet: 1 tab(s) orally 3 times a day (23 Sep 2022 21:00)  Vitamin D3 125 mcg (5000 intl units) oral tablet: 1 tab(s) orally once a day (23 Sep 2022 21:00)  Xarelto 20 mg oral tablet: 1 tab(s) orally once a day (in the evening) (23 Sep 2022 21:00)    Vital Signs Last 24 Hrs  T(C): 37.1 (27 Sep 2022 14:08), Max: 37.1 (27 Sep 2022 14:08)  T(F): 98.7 (27 Sep 2022 14:08), Max: 98.7 (27 Sep 2022 14:08)  HR: 74 (27 Sep 2022 14:08) (60 - 74)  BP: 155/67 (27 Sep 2022 14:08) (143/62 - 176/76)  BP(mean): 109 (27 Sep 2022 09:32) (94 - 109)  RR: 18 (27 Sep 2022 05:53) (18 - 18)  SpO2: --      CAPILLARY BLOOD GLUCOSE        LABS:                        11.0   6.66  )-----------( 166      ( 27 Sep 2022 07:01 )             32.8     09-27    138  |  104  |  21<H>  ----------------------------<  93  4.3   |  25  |  1.1    Ca    9.2      27 Sep 2022 07:01  Phos  4.5     09-27  Mg     1.9     09-27                        Consultant Notes Reviewed:  [x ] YES  [ ] NO  Care Discussed with Consultants/Other Providers/ Housestaff [ x] YES  [ ] NO  Radiology, labs, new studies personally reviewed.

## 2022-09-27 NOTE — DISCHARGE NOTE PROVIDER - CARE PROVIDER_API CALL
Katelyn Garcia)  Neurology  [address] [place] [address]  Phone: [phone]  Fax: [phone]  Follow Up Time: 2 weeks

## 2022-09-27 NOTE — DISCHARGE NOTE PROVIDER - NSDCFUSCHEDAPPT_GEN_ALL_CORE_FT
[facility] Physician [facility]  Cardio [address] [facility]  Scheduled Appointment: 10/18/2022

## 2022-09-27 NOTE — DISCHARGE NOTE PROVIDER - HOSPITAL COURSE
Hospital course:  76y Female with PMH of atrial fibrillation (on Xarelto), HTN, HLD, PPM, minimally invasive mitral valve repair (2003) recent admission for hyponatremia BIBEMS for the above chief complaint. history goes back to around 2:30 pm after running errands when patient mentioned feeling "funny". she saw her neighbors pulling in and she told them that she was not feeling well. neighbors spoke to daughter who spoke to her and noticed that she was confused. Daughter called her  who went to see her. by that time neighbors already called the ambulance who brought the patient to the [facility] ED where she was aphasic with a NHSS of 6. Patient lives alone. Patient did not take her Xarelto dose and her last dose was 9/21/22. [facility] ED discussed with Dr Garcia at time of stroke code. Patient was transferred to [facility] for stroke workup. Found to have severe stenosis at left distal ICA supraclinoid segment in the head/neck CTA. Pt started on AAS 81mg QD and reduced atorvastatin to 10mg to reduce risk of bleeding. TTE showed severely enlarged RA and LA. During her admission in the stroke unit, patient recovered to her baseline. After turned off her pacemaker transiently, head MRI showed punctate acute infarct within the left parietal lobe.     During this hospital course, patient had a acute ischemic stroke located in left parietal lobe and in left posterior insular cortex as seen on MRI   The stroke etiology is likely secondary to: cardioembolic (chronic atrial fibrillation) and atherotrombotic (severe stenosis at left distal ICA supraclinoid segment)    Patient had the following workup done in house:  - CTH: negative  - CTA H/N: focal severe stenosis at left distal ICA supraclinoid segment  - CTP: small 32mL perfusion abnormality in the left parietal region corresponding to occlusion of left P4 segment  - MR Head: Punctate acute infarct within the left parietal lobe. Small acute/subacute cortical infarct along the left posterior insular cortex.  Tiny chronic right inferior cerebellar infarct.  - TTE: EF 60-65%, severely enlarged RA and LA, no PFO, severe tricuspid regurg  - EP: PPM interrogated, no events  - Repeat CTH: negative    Physical exam at discharge:  Constitutional: WDWN; NAD  Eyes: anicteric sclera  Cardiovascular: +S1/S2, RRR; no M/R/G  Neurological:    Cognitive/Language:  Awake, alert, and oriented to person, place, time and date.  Recent and remote memory intact.  Fund of knowledge is appropriate.  Naming, repetition and comprehension intact. Nondysarthric.    Cranial Nerves  - Eyes: Visual acuity intact, Visual fields full.  EOMI w/o nystagmus, skew or reported double vision.  PERRL.  No ptosis/weakness of eyelid closure.    - Face:  Facial sensation normal V1 - 3, no facial asymmetry.    - Ears/Nose/Throat:  Hearing grossly intact b/l to finger rub.  Palate elevates midline.  Tongue and uvula midline.   Motor examination:  (MRC grade R/L) 5/5 UE; 5/5 LE.  No observable drift. Normal tone and bulk. No tenderness, twitching, tremors or involuntary movements.  Sensory examination:  Intact to light touch and pinprick, pain, temperature and proprioception and vibration in all extremities.  Reflexes:   2+ b/l biceps, triceps, patella and achilles.  Plantar response downgoing b/l.  Jaw jerk, Jomar, clonus absent.  Cerebellum:   FTN/HKS intact.  No dysmetria.    Gait narrow based and normal.    New medications on discharge: Aspirin 81mg QD. Rivaroxaban 20mg QHS. Metoprolol tartrate 50mg q8h. Atorvastatin 10mg QHS.   Labs to be followed up: None.  Imaging to be done as outpatient: None.  Further outpatient workup: Should follow up outpatient with Neurology   Hospital course:  76y Female with PMH of atrial fibrillation (on Xarelto), HTN, HLD, PPM, minimally invasive mitral valve repair (2003) recent admission for hyponatremia BIBEMS for the above chief complaint. history goes back to around 2:30 pm after running errands when patient mentioned feeling "funny". she saw her neighbors pulling in and she told them that she was not feeling well. neighbors spoke to daughter who spoke to her and noticed that she was confused. Daughter called her  who went to see her. by that time neighbors already called the ambulance who brought the patient to the [facility] ED where she was aphasic with a NHSS of 6. Patient lives alone. Patient did not take her Xarelto dose and her last dose was 9/21/22. [facility] ED discussed with Dr Garcia at time of stroke code. Patient was transferred to [facility] for stroke workup. Found to have severe stenosis at left distal ICA supraclinoid segment in the head/neck CTA. Pt started on AAS 81mg QD and reduced atorvastatin to 10mg to reduce risk of bleeding. TTE showed severely enlarged RA and LA. During her admission in the stroke unit, patient recovered to her baseline. After turned off her pacemaker transiently, head MRI showed punctate acute infarct within the left parietal lobe.     During this hospital course, patient had a acute ischemic stroke located in left parietal lobe and in left posterior insular cortex as seen on MRI   The stroke etiology is likely secondary to: cardioembolic (chronic atrial fibrillation) and atherotrombotic (severe stenosis at left distal ICA supraclinoid segment)    Patient had the following workup done in house:  - CTH: negative  - CTA H/N: focal severe stenosis at left distal ICA supraclinoid segment  - CTP: small 32mL perfusion abnormality in the left parietal region corresponding to occlusion of left P4 segment  - MR Head: Punctate acute infarct within the left parietal lobe. Small acute/subacute cortical infarct along the left posterior insular cortex.  Tiny chronic right inferior cerebellar infarct.  - TTE: EF 60-65%, severely enlarged RA and LA, no PFO, severe tricuspid regurg  - EP: PPM interrogated, no events  - Repeat CTH: negative    Physical exam at discharge:  Constitutional: WDWN; NAD  Eyes: anicteric sclera  Cardiovascular: +S1/S2, RRR; no M/R/G  Neurological:    Cognitive/Language:  Awake, alert, and oriented to person, place, time and date.  Recent and remote memory intact.  Fund of knowledge is appropriate.  Naming, repetition and comprehension intact. Nondysarthric.    Cranial Nerves  - Eyes: Visual acuity intact, Visual fields full.  EOMI w/o nystagmus, skew or reported double vision.  PERRL.  No ptosis/weakness of eyelid closure.    - Face:  Facial sensation normal V1 - 3, no facial asymmetry.    - Ears/Nose/Throat:  Hearing grossly intact b/l to finger rub.  Palate elevates midline.  Tongue and uvula midline.   Motor examination:  (MRC grade R/L) 5/5 UE; 5/5 LE.  No observable drift. Normal tone and bulk. No tenderness, twitching, tremors or involuntary movements.  Sensory examination:  Intact to light touch and pinprick, pain, temperature and proprioception and vibration in all extremities.  Reflexes:   2+ b/l biceps, triceps, patella and achilles.  Plantar response downgoing b/l.  Jaw jerk, Jomar, clonus absent.  Cerebellum:   FTN/HKS intact.  No dysmetria.    Gait narrow based and normal.    New medications on discharge: Aspirin 81mg QD. Rivaroxaban 20mg QHS. Metoprolol tartrate 50mg q8h. Atorvastatin 10mg QHS.   Labs to be followed up: None.  Imaging to be done as outpatient: None.  Further outpatient workup: Should follow up outpatient with Neurology      Stroke attending attestation:  Pt is a 77 yo F with PMHx of afib on xarelto, HTN, HLD, s/p PPM, recent hyponatremic episodes, who presented with transient expressive>receptive aphasia. NIHSS 0, mRS 0.     Impr: punctate acute ischemic stroke in left parietal lobe and subacute in left temporoparietal region  Appear cardioembolic  PTA: xarelto-> Continue xarelto and ASA 81mg daily  LDL 46, continue home dose of statin  D/c home, f/u in stroke clinic in 2 weeks .

## 2022-09-27 NOTE — DISCHARGE NOTE PROVIDER - NSDCCPCAREPLAN_GEN_ALL_CORE_FT
PRINCIPAL DISCHARGE DIAGNOSIS  Diagnosis: Stroke  Assessment and Plan of Treatment: During this hospital admission, you had two ischemic strokes. During an ischemic stroke, blood stops flowing to part of your brain because of a blockage in the blood vessel. This can damage areas in the brain that control other parts of the body.  Please take your aspirin and rivaroxaban for blood thinning and Atorvastatin for cholesterol medication/blood vessel protection as prescribed to prevent further strokes (now 10mg instead of your previous dose to reduce the risk of bleeding). Do not skip doses and do not run low on your medication. If you run low on your medication, please contact your doctor.  You will follow up outpatient with the stroke Nurse Practitioner as scheduled below.  Doing your regular tasks may be difficult after you've had a stroke, but you can learn new ways to manage your daily activities. In fact, doing daily activities may help you to regain muscle strength. Be patient, give yourself time to adjust, and appreciate the progress you make. For example, when showering or bathing, test the water temperature with a hand or foot that was not affected by the stroke, use grab bars, a shower seat, a hand-held showerhead, etc. It is normal to feel fatigue after a stroke, while some days may be worse than others, you will continue to improve.  Call 911 right away if you have any of the following symptoms of another stroke:  B: Balance: Sudden: Dizziness, loss of balance, or a sense of falling, difficulty with coordinating movement  E: Eyes: Sudden double vision or trouble seeing in one or both eyes  F: Face: Sudden uneven face  A: Arms (Legs): Sudden weakness, tingling, or loss of feeling on one side of your face or body  S: Speech: Sudden trouble talking or slurred speech, sudden difficulty understanding others  T: Time: Please call 911 right away and go to the emergency room  •Sudden, severe headache  •Blackouts or seizures        SECONDARY DISCHARGE DIAGNOSES  Diagnosis: Atrial fibrillation  Assessment and Plan of Treatment: Continue with your home flecainide.    Diagnosis: Pacemaker  Assessment and Plan of Treatment: During the admission pacemaker had to be momentarily turned off to perform your head MRI. Now it is again working.    Diagnosis: HTN (hypertension)  Assessment and Plan of Treatment: Continue with your antihypertension home medication.    Diagnosis: HLD (hyperlipidemia)  Assessment and Plan of Treatment: Continue with your statins home medication but in this lower dose.

## 2022-09-27 NOTE — PROGRESS NOTE ADULT - NS ATTEST RISK PROBLEM GEN_ALL_CORE FT
suspected CVA
76Y female with PMHx of Afib on Xarelto, HTN, HLD, PPM, recent admission for hyponatremia BIBEMS for AMS. Patient was first in [facility] ED -> NIHSS 6 for aphasia and confusion-> transferred to [place] ED -> imaging done -> Small 32 mL perfusion abnormality in the left parietal region corresponding to occlusion of left P4 segment but NIHSS was 0 and patient not a candidate for TPA or NI. admitted to stroke unit     Case discussed with Stroke team , VITALS, i/o, , MEDS , LABS AND IMAGING REVIEWED   AT RISK OF STROKE AND BRAIN SWELLING  Formulated Assessment and plan with Stroke Team .    agree with plan of care as above
suspected CVA
suspected CVA

## 2022-09-27 NOTE — PROGRESS NOTE ADULT - ATTENDING COMMENTS
Pt is a 77 yo F with PMHx of afib on xarelto, HTN, HLD, s/p PPM, recent hyponatremic episodes, who presented with transient expressive>receptive aphasia. NIHSS 0 today, mRS 0.   CT head without acute process. CTP with perfusion deficit in region of inferior division of left MCA (not PCA). CTA head/neck with bilateral supraclinoid ICA stenosis.   MRI brain pending, waiting for PPM rep. If unable to obtain by tomorrow, repeat CT head. If no acute process appreciated, likely TIA.   Continue home xarelto, add ASA 81mg daily x 2 weeks.   LDL 46, continue home dose of statin  PT/OT/ST, tele, OK to downgrade to 3E, likely d/c home tomorrow
Pt is a 77 yo F with PMHx of afib on xarelto, HTN, HLD, s/p PPM, recent hyponatremic episodes, who presented with transient expressive>receptive aphasia. NIHSS 0, mRS 0.     Impr: punctate acute ischemic stroke in left parietal lobe and subacute in left temporoparietal region  Appear cardioembolic  PTA: xarelto-> Continue xarelto and ASA 81mg daily  LDL 46, continue home dose of statin  D/c home, f/u in stroke clinic in 2 weeks
Case discussed with Stroke team , VITALS, i/o, , MEDS , LABS AND IMAGING REVIEWED   AT RISK OF STROKE AND BRAIN SWELLING  Formulated Assessment and plan with Stroke Team

## 2022-09-27 NOTE — PROGRESS NOTE ADULT - ASSESSMENT
76Y female with PMHx of Afib on Xarelto, HTN, HLD, PPM, recent admission for hyponatremia BIBEMS for AMS. Patient was first in [facility] ED -> NIHSS 6 for aphasia and confusion-> transferred to [facility] ED -> imaging done -> Small 32 mL perfusion abnormality in the left parietal region corresponding to occlusion of left P4 segment but NIHSS was 0 and patient not a candidate for TPA or NI. admitted to stroke unit for further work up and evaluations:  -LKW 10 pm as per daughter who talked to her over the phone.  Admission NIHSS 6 -> transferred to [facility] ED- NIHSS- 0  -Extracranial carotid stenosis   -TIA    #Likely transient ischemic attack in left ICA territory of undetermined etiology  - Pt with Hx of Afib on Xarelto, missed one dose the night before.  - no sepsis POA  - Stroke Core measures  - A1C - 5.6  - On statin  - Augment SBP since exam improved at elevated .  - CTH -> negative for acute findings.   - CTA head/neck -> Focal severe stenosis of right distal ICA supraclinoid segment. Focal mild/moderate stenosis at left distal ICA supraclinoid segment.   - CTP-Small 32 mL perfusion abnormality in the left parietal region corresponding to occlusion of left P4 segment   - not a candidate for tpa or IA  - q2 neurochecks  - Permissive HTN SBP goal 150 - 200, w ICA stenosis   lopressor with parameters  - Q2 neuro and vital check for the next 48 hours.   - Resumed Xarelto and added AAS  - MRI brain non contrast, Pt has pacemaker, copy of the card in the chart. Ordered a non-contrast head CT.  - TTE with bubble, performed, reported significant findings, although no PFO.   - LDL 46, C/w home mod dose statin  - PT/OT/ST/rehab eval   - S/S evaled and passed  - NI consulted regarding CTA findings.   - B/l Carotid duplex requested.     #Chronic Afib  - MRI compatability for Pacemaker  - Interrogate  PM  - Maintain electrolytes Mg - > 2.0; Phos - > 3.0 and K - > 4.0  - Echo as above  - s/p ablation 2x (2011 and 2012)  - c/w on Xarelto and started on AAS 81mg QD  - c/w flecainide, metoprolol tartrate 50 mg TID  - Cardiologist is Dr. Ng    #Hs of pacemaker   - [employer]), copy of the card is in the chart.   - Might be available tomorrow.  -     # Hx minimally invasive mitral valve repair  - in 2003    #hyperlipidemia, improved, LDL 46  - on rosuvastatin at home, c/w atorvastatin while in patient- mod dose     #MISC  DVT PPX Xarelto  GI PPX pepcid (home med)  DIET soft and bite sized until S&S eval  CODE FULL   Dispo: Stroke unit, active. Independent per PT, likely home, pending clinical course.

## 2022-09-27 NOTE — DISCHARGE NOTE PROVIDER - NSDCMRMEDTOKEN_GEN_ALL_CORE_FT
aspirin 81 mg oral tablet, chewable: 1 tab(s) orally once a day  atorvastatin 10 mg oral tablet: 1 tab(s) orally once a day (at bedtime)  famotidine 20 mg oral tablet: 1 tab(s) orally once a day  flecainide 50 mg oral tablet: 1 tab(s) orally every 12 hours  Metoprolol Tartrate 50 mg oral tablet: 1 tab(s) orally 3 times a day  Vitamin D3 125 mcg (5000 intl units) oral tablet: 1 tab(s) orally once a day  Xarelto 20 mg oral tablet: 1 tab(s) orally once a day (in the evening)

## 2022-09-27 NOTE — PROGRESS NOTE ADULT - TIME BILLING
time spent on review of labs, imaging studies, old records, obtaining history, personally examining patient, counselling and communicating with patient/ family, entering orders for medications/tests/etc, discussions with other health care providers, documentation in electronic health records, independent interpretation of labs, imaging/procedure results and care coordination.
Review of imaging and chart; obtaining history; examination of pt; discussion and coordination of care.
Review of imaging and chart; obtaining history; examination of pt; discussion and coordination of care.
time spent on review of labs, imaging studies, old records, obtaining history, personally examining patient, counselling and communicating with patient/ family, entering orders for medications/tests/etc, discussions with other health care providers, documentation in electronic health records, independent interpretation of labs, imaging/procedure results and care coordination.
time spent on review of labs, imaging studies, old records, obtaining history, personally examining patient, counselling and communicating with patient/ family, entering orders for medications/tests/etc, discussions with other health care providers, documentation in electronic health records, independent interpretation of labs, imaging/procedure results and care coordination.

## 2022-09-27 NOTE — PROGRESS NOTE ADULT - SUBJECTIVE AND OBJECTIVE BOX
NEUROLOGY CONSULT PROGRESS NOTE    INTERVAL HISTORY:      REVIEW OF SYSTEMS:  As per HPI, otherwise negative for Constitutional, Eyes, Ears/Nose/Mouth/Throat, Neck, Cardiovascular, Respiratory, Gastrointestinal, Genitourinary, Skin, Endocrine, Musculoskeletal, Psychiatric, and Hematologic/Lymphatic.    MEDICATIONS:  acetaminophen     Tablet .. 650 milliGRAM(s) Oral every 6 hours PRN  aluminum hydroxide/magnesium hydroxide/simethicone Suspension 30 milliLiter(s) Oral every 4 hours PRN  aspirin  chewable 81 milliGRAM(s) Oral daily  atorvastatin 10 milliGRAM(s) Oral at bedtime  famotidine    Tablet 20 milliGRAM(s) Oral daily  flecainide 50 milliGRAM(s) Oral every 12 hours  melatonin 3 milliGRAM(s) Oral at bedtime PRN  metoprolol tartrate 50 milliGRAM(s) Oral every 8 hours  ondansetron Injectable 4 milliGRAM(s) IV Push every 8 hours PRN  rivaroxaban 20 milliGRAM(s) Oral with dinner  senna 2 Tablet(s) Oral at bedtime  sodium chloride 0.9%. 1000 milliLiter(s) IV Continuous <Continuous>    VITAL SIGNS:  Vital Signs Last 24 Hrs  T(C): 36.2 (27 Sep 2022 05:53), Max: 36.2 (27 Sep 2022 05:53)  T(F): 97.1 (27 Sep 2022 05:53), Max: 97.1 (27 Sep 2022 05:53)  HR: 60 (27 Sep 2022 05:53) (60 - 68)  BP: 156/72 (27 Sep 2022 05:53) (143/62 - 179/81)  BP(mean): 94 (26 Sep 2022 18:50) (94 - 130)  RR: 18 (27 Sep 2022 05:53) (18 - 18)  SpO2: 99% (26 Sep 2022 11:30) (99% - 99%)    Parameters below as of 26 Sep 2022 11:30  Patient On (Oxygen Delivery Method): room air        PHYSICAL EXAMINATION:  Constitutional: WDWN; NAD  Eyes: anicteric sclera  Cardiovascular: +S1/S2, RRR; no M/R/G  Neurologic:  - Mental Status:  AAOx3; speech is fluent with intact naming, repetition, and comprehension  - Cranial Nerves II-XII:    II:  PERRLA; visual fields are full to confrontation  III, IV, VI:  EOMI, no nystagmus  V:  facial sensation is intact in the V1-V3 distribution bilaterally.  VII:  face is symmetric with normal eye closure and smile  VIII:  hearing is intact to finger rub  IX, X:  uvula is midline and soft palate rises symmetrically  XI:  head turning and shoulder shrug are intact bilaterally  XII:  tongue protrudes in the midline  - Motor:  strength is 5/5 throughout; normal muscle bulk and tone throughout; no pronator drift  - Reflexes:  2+ and symmetric at the biceps, triceps, brachioradialis, knees, and ankles;  plantar reflexes downgoing bilaterally  - Sensory:  intact to light touch, pin prick, vibration, and joint-position sense throughout  - Coordination:  finger-nose-finger and heel-knee-shin intact without dysmetria; rapid alternating hand movements intact  - Gait:  normal steps, base, arm swing, and turning; heel and toe walking are normal; tandem gait is normal; Romberg testing is negative    LABS:                          11.0   6.66  )-----------( 166      ( 27 Sep 2022 07:01 )             32.8     09-27    138  |  104  |  21<H>  ----------------------------<  93  4.3   |  25  |  1.1    Ca    9.2      27 Sep 2022 07:01  Phos  4.5     09-27  Mg     1.9     09-27 NEUROLOGY CONSULT PROGRESS NOTE    INTERVAL HISTORY:      REVIEW OF SYSTEMS:  As per HPI, otherwise negative for Constitutional, Eyes, Ears/Nose/Mouth/Throat, Neck, Cardiovascular, Respiratory, Gastrointestinal, Genitourinary, Skin, Endocrine, Musculoskeletal, Psychiatric, and Hematologic/Lymphatic.    MEDICATIONS:  acetaminophen     Tablet .. 650 milliGRAM(s) Oral every 6 hours PRN  aluminum hydroxide/magnesium hydroxide/simethicone Suspension 30 milliLiter(s) Oral every 4 hours PRN  aspirin  chewable 81 milliGRAM(s) Oral daily  atorvastatin 10 milliGRAM(s) Oral at bedtime  famotidine    Tablet 20 milliGRAM(s) Oral daily  flecainide 50 milliGRAM(s) Oral every 12 hours  melatonin 3 milliGRAM(s) Oral at bedtime PRN  metoprolol tartrate 50 milliGRAM(s) Oral every 8 hours  ondansetron Injectable 4 milliGRAM(s) IV Push every 8 hours PRN  rivaroxaban 20 milliGRAM(s) Oral with dinner  senna 2 Tablet(s) Oral at bedtime  sodium chloride 0.9%. 1000 milliLiter(s) IV Continuous <Continuous>    VITAL SIGNS:  Vital Signs Last 24 Hrs  T(C): 36.2 (27 Sep 2022 05:53), Max: 36.2 (27 Sep 2022 05:53)  T(F): 97.1 (27 Sep 2022 05:53), Max: 97.1 (27 Sep 2022 05:53)  HR: 60 (27 Sep 2022 05:53) (60 - 68)  BP: 156/72 (27 Sep 2022 05:53) (143/62 - 179/81)  BP(mean): 94 (26 Sep 2022 18:50) (94 - 130)  RR: 18 (27 Sep 2022 05:53) (18 - 18)  SpO2: 99% (26 Sep 2022 11:30) (99% - 99%)    Parameters below as of 26 Sep 2022 11:30  Patient On (Oxygen Delivery Method): room air        PHYSICAL EXAMINATION:  Constitutional: WDWN; NAD  Eyes: anicteric sclera  Cardiovascular: +S1/S2, RRR; no M/R/G  Neurological:    Cognitive/Language:  Awake, alert, and oriented to person, place, time and date.  Recent and remote memory intact.  Fund of knowledge is appropriate.  Naming, repetition and comprehension intact. Nondysarthric.    Cranial Nerves  - Eyes: Visual acuity intact, Visual fields full.  EOMI w/o nystagmus, skew or reported double vision.  PERRL.  No ptosis/weakness of eyelid closure.    - Face:  Facial sensation normal V1 - 3, no facial asymmetry.    - Ears/Nose/Throat:  Hearing grossly intact b/l to finger rub.  Palate elevates midline.  Tongue and uvula midline.   Motor examination:  (MRC grade R/L) 5/5 UE; 5/5 LE.  No observable drift. Normal tone and bulk. No tenderness, twitching, tremors or involuntary movements.  Sensory examination:  Intact to light touch and pinprick, pain, temperature and proprioception and vibration in all extremities.  Reflexes:   2+ b/l biceps, triceps, patella and achilles.  Plantar response downgoing b/l.  Jaw jerk, Jomar, clonus absent.  Cerebellum:   FTN/HKS intact.  No dysmetria.    Gait narrow based and normal.    LABS:                          11.0   6.66  )-----------( 166      ( 27 Sep 2022 07:01 )             32.8     09-27    138  |  104  |  21<H>  ----------------------------<  93  4.3   |  25  |  1.1    Ca    9.2      27 Sep 2022 07:01  Phos  4.5     09-27  Mg     1.9     09-27

## 2022-09-27 NOTE — PROGRESS NOTE ADULT - ASSESSMENT
76Y female with PMHx of Afib on Xarelto, HTN, HLD, PPM, recent admission for hyponatremia BIBEMS for AMS. Patient was first in [facility] ED -> NIHSS 6 for aphasia and confusion-> transferred to [facility] ED -> imaging done -> Small 32 mL perfusion abnormality in the left parietal region corresponding to occlusion of left P4 segment but NIHSS was 0 and patient not a candidate for TPA or NI. admitted to stroke unit for further work up and evaluations:     #Acute ischemic stroke vs TIA, Pt with Hx of Afib on Xarelto, missed one dose the night before:   #Intracranial stenosis  - LKW 10 pm as per daughter who talked to her over the phone.  - first in [facility] ED -> NIHSS 6 -> transferred to [facility] ED  - CTH -> negative for acute findings.   - CTA head/neck -> Focal severe stenosis of right distal ICA supraclinoid segment. Focal mild/moderate stenosis at left distal ICA supraclinoid segment.   - CTP-Small 32 mL perfusion abnormality in the left parietal region corresponding to occlusion of left P4 segment   - NIHSS improved to 0  - not a candidate for tpa or IA  - q2 neurochecks  - Resumed Xarelto   - TTE: nl EF  - a1c 5.6  - LDL 46, C/w home meds  - PT eval, outpatient PT.   - NI consulted regarding CTA findings.   -consider adding ASA 81  < from: MR Head No Cont (09.27.22 @ 13:28) >  1.  Punctate acute infarct within the left parietal lobe.  2.  Small acute/subacute cortical infarct along the left posterior   insularcortex.  3.  Mild/moderate chronic microvascular changes and parenchymal atrophy.   Tiny chronic right inferior cerebellar infarct.    < end of copied text >      #Chronic Afib  - s/p ablation 2x (2011 and 2012)  - resume Xarelto as per neuro  - c/w flecainide, metoprolol tartrate 50 mg TID (can give lower dose temporarily  if needed for permissive HTN)  - Cardiologist is Dr. Ng    #HTN  recommend to start low dose ACE for better BP control    #Hs  of pacemaker   - [employer]), copy of the card is in the chart.     # Hx minimally invasive mitral valve repair  - in 2003    #hyperlipidemia, LDL 46  - on rosuvastatin at home, c/w atorvastatin while in patient    #MISC  DVT PPX Xarelto  GI PPX Protonix  CODE FULL     #Progress Note Handoff  Pending: Consults____Clinical improvement and stability__x_____PT____x____  Pt/Family discussion: Pt informed and agrees with the current plan  Disposition: Home___    My note supersedes the residents note should a discrepancy arise.    Chart and notes personally reviewed.  Care Discussed with Consultants/Other Providers/ Housestaff [ x] YES [ ] NO   Radiology, labs, old records personally reviewed.    d/w Housestaff, nursing, case mgmt, neuro

## 2022-09-28 ENCOUNTER — TRANSCRIPTION ENCOUNTER (OUTPATIENT)
Age: 77
End: 2022-09-28

## 2022-09-28 RX ORDER — ATORVASTATIN CALCIUM 80 MG/1
1 TABLET, FILM COATED ORAL
Qty: 30 | Refills: 2 | DISCHARGE
Start: 2022-09-28 | End: 2022-12-26

## 2022-09-28 RX ORDER — ASPIRIN/CALCIUM CARB/MAGNESIUM 324 MG
1 TABLET ORAL
Qty: 30 | Refills: 2
Start: 2022-09-28 | End: 2022-12-26

## 2022-09-28 RX ORDER — ATORVASTATIN CALCIUM 80 MG/1
1 TABLET, FILM COATED ORAL
Qty: 30 | Refills: 2
Start: 2022-09-28 | End: 2022-12-26

## 2022-09-28 RX ORDER — LISINOPRIL 2.5 MG/1
1 TABLET ORAL
Qty: 30 | Refills: 2
Start: 2022-09-28 | End: 2022-12-26

## 2022-09-30 DIAGNOSIS — Z79.01 LONG TERM (CURRENT) USE OF ANTICOAGULANTS: ICD-10-CM

## 2022-09-30 DIAGNOSIS — I65.22 OCCLUSION AND STENOSIS OF LEFT CAROTID ARTERY: ICD-10-CM

## 2022-09-30 DIAGNOSIS — E78.5 HYPERLIPIDEMIA, UNSPECIFIED: ICD-10-CM

## 2022-09-30 DIAGNOSIS — I63.9 CEREBRAL INFARCTION, UNSPECIFIED: ICD-10-CM

## 2022-09-30 DIAGNOSIS — I65.21 OCCLUSION AND STENOSIS OF RIGHT CAROTID ARTERY: ICD-10-CM

## 2022-09-30 DIAGNOSIS — R29.706 NIHSS SCORE 6: ICD-10-CM

## 2022-09-30 DIAGNOSIS — I48.20 CHRONIC ATRIAL FIBRILLATION, UNSPECIFIED: ICD-10-CM

## 2022-09-30 DIAGNOSIS — I10 ESSENTIAL (PRIMARY) HYPERTENSION: ICD-10-CM

## 2022-09-30 DIAGNOSIS — I63.512 CEREBRAL INFARCTION DUE TO UNSPECIFIED OCCLUSION OR STENOSIS OF LEFT MIDDLE CEREBRAL ARTERY: ICD-10-CM

## 2022-09-30 DIAGNOSIS — Z95.0 PRESENCE OF CARDIAC PACEMAKER: ICD-10-CM

## 2022-10-03 PROBLEM — Z98.890 OTHER SPECIFIED POSTPROCEDURAL STATES: Chronic | Status: ACTIVE | Noted: 2022-08-26

## 2022-10-11 ENCOUNTER — APPOINTMENT (OUTPATIENT)
Dept: NEUROLOGY | Facility: CLINIC | Age: 77
End: 2022-10-11

## 2022-10-11 PROCEDURE — 99215 OFFICE O/P EST HI 40 MIN: CPT

## 2022-10-11 RX ORDER — ROSUVASTATIN CALCIUM 5 MG/1
5 TABLET, FILM COATED ORAL DAILY
Refills: 0 | Status: DISCONTINUED | COMMUNITY
End: 2022-10-11

## 2022-10-11 NOTE — PHYSICAL EXAM
[FreeTextEntry1] : Focal neurological exam:\par \par MS: Awake, alert, oriented to person, place, situation and time. Normal affect. Follows commands. \par \par Language: Speech is clear, fluent with good repetition & comprehension. No dysarthria. \par \par CNs 2 - 12 intact. EOMI no nystagmus, no diplopia. VFF. No facial asymmetry b/l, full eye closure strength b/l. Hearing grossly normal. Head turning & shoulder shrug intact b/l. Tongue midline, normal movements, no atrophy.\par \par Motor: Normal muscle bulk & tone. No noticeable tremor or seizure. No pronator drift. Muscle strength of b/l UE and b/l LE 5/5. \par \par Reflexes: DTR of biceps, knee and ankle normal \par \par Sensation: Intact to LT, temperature and vibration b/l throughout\par \par Cortical: No extinction\par \par Coordination: No dysmetria to FTN.\par \par Gait: No postural instability. Normal stance and tandem gait.\par \par NIHSS 0 \par \par \par \par

## 2022-10-11 NOTE — HISTORY OF PRESENT ILLNESS
[FreeTextEntry1] : Patient is 77 yo RH woman with PMHx of Afib on Xarelto (had failed ablation and PPM was placed), HTN, HLD, hyponatremia found to have new punctate infarcts of L parietal and L posterior insular cortex, thought to be cardioembolic bc she missed a dose of Xarelto. However, she does have Focal mild-mod stenosis at L distal supraclinoid ICA w/ ?thromboembolic cause from stroke (SBP in ED was 186).  \par \par CTH---Negative for acute findings  \par CTA H/N---Focal severe stenosis of right distal ICA supraclinoid segment. Focal mild/moderate stenosis at left distal ICA supraclinoid segment.  \par CTP---Small 32 mL perfusion abnormality in the left parietal region corresponding to occlusion of left P4 segment (3-317).  \par \par TTE: EF 60-65%, severely enlarged RA and LA, no PFO, severe tricuspid regurg \par \par MRI H: \par -Punctate acute infarct within the left parietal lobe. \par -Small acute/subacute cortical infarct along the left posterior insular cortex. \par -Mild/moderate chronic microvascular changes and parenchymal atrophy. Tiny chronic right inferior cerebellar infarct. \par \par NIHSS improved from 5--->2--->0. NO TPA given bc outside window. P\par \par A1c 5.6, LDL 46 \par \par MEDS: ASA 81, Lipitor 10 and Xarelto  \par -------------------------------------------------------\par Today, she says she has had hyponatremia for at least past mo w/unclear etiology, pending Nephro c/s \par She's getting bleeding from hemorrhoids w BM more than usual since starting ASA, but no bleeding with brushing teeth or spontaneous bruising \par She feels fatigue but is improving. \par \par

## 2022-10-11 NOTE — ASSESSMENT
[FreeTextEntry1] : Patient is 75 yo RH woman with PMHx of Afib on Xarelto (had failed ablation and PPM was placed), HTN, HLD, hyponatremia found to have new punctate infarcts of L parietal and L posterior insular cortex, thought to be cardioembolic bc she missed a dose of Xarelto. However, she does have Focal mild-mod stenosis at L distal supraclinoid ICA w/ ?thromboembolic cause from stroke (SBP in ED was 186).  Today, neuro exam is nonfocal and she feels well aside for some fatigue that is improving. \par \par PLAN: \par -C/w Xarelto and ASA 81 bc of ?symptomatic L distal ICA supraclinoid segment stenosis \par -Has been on dual therapy ASA/Xarelto for 3 wks, will check with Dr. Garcia how long she needs this regimen and if ASA can be d/c'd  \par -Keep hydrated but f/u with PCP for fluid amt \par -C/w Lipitor 10 and repeat lipid panel, LDL mgmt per PCP to goal 40-70\par -F/u with Dr. Garcia in 6 mo  \par \par Aggressive risk factor modification should be continued for secondary stroke prevention, consisting of intensive blood pressure control and cholesterol monitoring. I encouraged the patient to discuss these important issues with her primary care doctor.  \par \par I have reviewed the goals of stroke risk factor modification. I counseled the patient on measures to reduce stroke risk, including the importance of medication compliance, risk factor control, exercise, healthy diet and avoidance of smoking. I reviewed stroke warning signs and symptoms and appropriate actions to take if such occur.\par \par Instructed patient to call 911 or report to ED if any acute neurological changes occur, such as having severe, sudden, unusual headache or BEFAST symptoms\par

## 2022-10-14 ENCOUNTER — NON-APPOINTMENT (OUTPATIENT)
Age: 77
End: 2022-10-14

## 2022-10-18 ENCOUNTER — NON-APPOINTMENT (OUTPATIENT)
Age: 77
End: 2022-10-18

## 2022-10-18 ENCOUNTER — APPOINTMENT (OUTPATIENT)
Dept: CARDIOLOGY | Facility: CLINIC | Age: 77
End: 2022-10-18

## 2022-10-18 PROCEDURE — 93294 REM INTERROG EVL PM/LDLS PM: CPT

## 2022-10-18 PROCEDURE — 93296 REM INTERROG EVL PM/IDS: CPT

## 2023-01-19 ENCOUNTER — APPOINTMENT (OUTPATIENT)
Dept: CARDIOLOGY | Facility: CLINIC | Age: 78
End: 2023-01-19
Payer: MEDICARE

## 2023-01-19 ENCOUNTER — NON-APPOINTMENT (OUTPATIENT)
Age: 78
End: 2023-01-19

## 2023-01-19 PROCEDURE — 93294 REM INTERROG EVL PM/LDLS PM: CPT

## 2023-01-19 PROCEDURE — 93296 REM INTERROG EVL PM/IDS: CPT

## 2023-02-13 ENCOUNTER — NON-APPOINTMENT (OUTPATIENT)
Age: 78
End: 2023-02-13

## 2023-02-13 LAB
CHOLEST SERPL-MCNC: 142 MG/DL
HDLC SERPL-MCNC: 69 MG/DL
LDLC SERPL CALC-MCNC: 49 MG/DL
NONHDLC SERPL-MCNC: 73 MG/DL
TRIGL SERPL-MCNC: 120 MG/DL

## 2023-02-21 ENCOUNTER — APPOINTMENT (OUTPATIENT)
Dept: ELECTROPHYSIOLOGY | Facility: CLINIC | Age: 78
End: 2023-02-21
Payer: MEDICARE

## 2023-02-21 VITALS
TEMPERATURE: 97.8 F | SYSTOLIC BLOOD PRESSURE: 149 MMHG | DIASTOLIC BLOOD PRESSURE: 77 MMHG | HEIGHT: 65 IN | BODY MASS INDEX: 22.49 KG/M2 | HEART RATE: 77 BPM | WEIGHT: 135 LBS

## 2023-02-21 PROCEDURE — 93280 PM DEVICE PROGR EVAL DUAL: CPT

## 2023-02-21 PROCEDURE — 99213 OFFICE O/P EST LOW 20 MIN: CPT

## 2023-02-21 RX ORDER — ATORVASTATIN CALCIUM 10 MG/1
10 TABLET, FILM COATED ORAL
Refills: 0 | Status: DISCONTINUED | COMMUNITY

## 2023-02-21 RX ORDER — TROSPIUM CHLORIDE 60 MG/1
60 CAPSULE, EXTENDED RELEASE ORAL
Refills: 0 | Status: DISCONTINUED | COMMUNITY

## 2023-03-18 NOTE — PHYSICAL EXAM
[General Appearance - Well Developed] : well developed [Normal Appearance] : normal appearance [Well Groomed] : well groomed [General Appearance - Well Nourished] : well nourished [No Deformities] : no deformities [General Appearance - In No Acute Distress] : no acute distress [Heart Rate And Rhythm] : heart rate and rhythm were normal [Heart Sounds] : normal S1 and S2 [Murmurs] : no murmurs present [Respiration, Rhythm And Depth] : normal respiratory rhythm and effort [Exaggerated Use Of Accessory Muscles For Inspiration] : no accessory muscle use [Auscultation Breath Sounds / Voice Sounds] : lungs were clear to auscultation bilaterally [Left Infraclavicular] : left infraclavicular area [Clean] : clean [Dry] : dry [Abdomen Soft] : soft [Abdomen Tenderness] : non-tender [Abdomen Mass (___ Cm)] : no abdominal mass palpated [Nail Clubbing] : no clubbing of the fingernails [Cyanosis, Localized] : no localized cyanosis [Petechial Hemorrhages (___cm)] : no petechial hemorrhages [] : no ischemic changes [Normal Conjunctiva] : the conjunctiva exhibited no abnormalities [Eyelids - No Xanthelasma] : the eyelids demonstrated no xanthelasmas [Normal Oral Mucosa] : normal oral mucosa [No Oral Pallor] : no oral pallor [No Oral Cyanosis] : no oral cyanosis [Normal Jugular Venous A Waves Present] : normal jugular venous A waves present [Normal Jugular Venous V Waves Present] : normal jugular venous V waves present [No Jugular Venous White A Waves] : no jugular venous white A waves [Erythema] : not erythematous

## 2023-03-18 NOTE — ADDENDUM
[FreeTextEntry1] : IAreli assisted in documentation on 02/22/2023   acting as a scribe for Dr. Johnson Hinton.\par

## 2023-03-18 NOTE — PROCEDURE
[See Scanned Paceart Report] : See scanned paceart report [See Device Printout] : See device printout [Pacemaker] : pacemaker [Programmed for Longevity] : output reprogrammed for improved battery longevity [de-identified] : [place]

## 2023-03-18 NOTE — ASSESSMENT
[FreeTextEntry1] : Patient with history of MVR MAZE, AF/AFL s/p AF ablation x2. Patient is doing great on flecanide and meteopolol\par \par AF \par - Patient had no recent episodes of atrial fibrillation and is doing well. \par - Continue Xarelto 20 mg once a day, no bleeding. \par - Continue Flecainide 50 mg twice a day. \par - Continue Metoprolol 50 mg twice a day. \par - Stress test to evaluate for any coronary disease given the fact the patient is taking 1C drugs. \par \par Sick Sinus Syndrome/Complete Heart Block \par - Normal dual chamber pacemaker function.

## 2023-03-18 NOTE — HISTORY OF PRESENT ILLNESS
[None] : The patient complains of no symptoms [Palpitations] : no palpitations [SOB] : no dyspnea [Erythema at Site] : no erythema at device site [Swelling at Site] : no swelling at device site [de-identified] : Cardiologist: Dr. Taylor\par \par Patient with history of HTN,  MVR MAZE, AF/AFL s/p AF ablation x2. Patient is doing great on flecanide . Had a dual chamber PPM implanted for symptomatic   intermittent  CHB on  1/29/2018. TIA 2002 CHADVSC 6\par \par \par Patient was recently hospitalized for a TIA which is new. Patient comes for a routine follow-up today. On pacemaker evaluation, it showed no AF since last interrogation.\par \par \par EKG (02/21/2023): Sinus rhythm at 64 bpm with first degree AV block. \par CTA 8/18 - normal\par labs reviewed\par \par \par \par Nuclear 6/17/17 - normal EF 60%

## 2023-03-29 ENCOUNTER — APPOINTMENT (OUTPATIENT)
Dept: ORTHOPEDIC SURGERY | Facility: CLINIC | Age: 78
End: 2023-03-29

## 2023-05-23 ENCOUNTER — NON-APPOINTMENT (OUTPATIENT)
Age: 78
End: 2023-05-23

## 2023-05-23 ENCOUNTER — APPOINTMENT (OUTPATIENT)
Dept: CARDIOLOGY | Facility: CLINIC | Age: 78
End: 2023-05-23
Payer: MEDICARE

## 2023-05-23 PROCEDURE — 93296 REM INTERROG EVL PM/IDS: CPT

## 2023-05-23 PROCEDURE — 93294 REM INTERROG EVL PM/LDLS PM: CPT

## 2023-06-12 ENCOUNTER — APPOINTMENT (OUTPATIENT)
Dept: NEUROLOGY | Facility: CLINIC | Age: 78
End: 2023-06-12
Payer: MEDICARE

## 2023-06-12 VITALS
DIASTOLIC BLOOD PRESSURE: 71 MMHG | TEMPERATURE: 98.4 F | WEIGHT: 135 LBS | SYSTOLIC BLOOD PRESSURE: 157 MMHG | HEIGHT: 65 IN | BODY MASS INDEX: 22.49 KG/M2 | OXYGEN SATURATION: 97 % | HEART RATE: 66 BPM

## 2023-06-12 DIAGNOSIS — I63.9 CEREBRAL INFARCTION, UNSPECIFIED: ICD-10-CM

## 2023-06-12 PROCEDURE — 99213 OFFICE O/P EST LOW 20 MIN: CPT

## 2023-06-12 NOTE — HISTORY OF PRESENT ILLNESS
[FreeTextEntry1] : Jazmyne Jones is a 77 year old female with PMHx of afib on xarelto, s/p PPM, HTN, HLD, who presents for stroke follow up. Pt had a few punctate left parietal/insular strokes in 09/2022 after a missed dose of xarelto. Patient feels she is doing well. Denies speech difficulty, + light headed intermittently, denies weakness, denies difficulty sleeping but sometimes she finds herself taking daytime naps.

## 2023-06-12 NOTE — PHYSICAL EXAM
[FreeTextEntry1] : Mental status: Awake, alert and oriented x3.  Recent and remote memory intact.  Naming, repetition and comprehension intact.  Attention/concentration intact.  No dysarthria. Language is fluent, naming intact, repetition and comprehension intact.  Fund of knowledge appropriate. \par \par Cranial nerves: Pupils equally round and reactive to light, visual fields full, no nystagmus, extraocular muscles intact, V1 through V3 intact bilaterally and symmetric, face symmetric\par \par Motor:  MRC grading 5/5 b/l UE/LE.   strength 5/5.  Normal tone and bulk.  No abnormal movements. \par \par Sensation: Intact to light touch\par \par Coordination: No dysmetria on finger-to-nose\par \par Gait: Narrow and steady. No ataxia.  \par

## 2023-06-12 NOTE — DISCUSSION/SUMMARY
[FreeTextEntry1] : Jazmyne Jones is a 77 year old female with PMHx of afib on xarelto, s/p PPM, HTN, HLD, who presents for stroke follow up.\par \par # Left parietal/insular ischemic strokes: suspected to be embolic 2/2 missed dose of xarelto. NIHSS 0, mRS 0. CTA head/neck with focal severe right cav ICA stenosis and mild/mod left cav ICA stenosis. \par - Continue xarelto\par - Recent LDL 60, goal <70. Continue lipitor 10mg every other day (pt had transaminitis with daily dosing)\par - BP goal <140/90mmHg (average daily SBP at home 120-130)\par \par RTC in 12 mo

## 2023-07-18 NOTE — ED ADULT NURSE NOTE - HISTORY OF COVID-19 VACCINATION
CERTIFICATE OF WORK    7/18/2023        Daniel Candelariosarah Holt  [address] [place] [address]        This is to certify that Daniel Jerome Jr. has been under my care from 7/18/2023 at 3:53 PM and is excused from work tonight due to injury.       SIGNATURE:_SATISH Morris        .[facility]  [address]  [place] [address] [phone]            
Vaccine status unknown

## 2023-08-22 ENCOUNTER — NON-APPOINTMENT (OUTPATIENT)
Age: 78
End: 2023-08-22

## 2023-08-22 ENCOUNTER — APPOINTMENT (OUTPATIENT)
Dept: CARDIOLOGY | Facility: CLINIC | Age: 78
End: 2023-08-22
Payer: MEDICARE

## 2023-08-22 PROCEDURE — 93296 REM INTERROG EVL PM/IDS: CPT

## 2023-08-22 PROCEDURE — 93294 REM INTERROG EVL PM/LDLS PM: CPT

## 2023-10-29 ENCOUNTER — RX RENEWAL (OUTPATIENT)
Age: 78
End: 2023-10-29

## 2023-11-21 ENCOUNTER — APPOINTMENT (OUTPATIENT)
Dept: ELECTROPHYSIOLOGY | Facility: CLINIC | Age: 78
End: 2023-11-21
Payer: MEDICARE

## 2023-11-21 VITALS
RESPIRATION RATE: 18 BRPM | DIASTOLIC BLOOD PRESSURE: 80 MMHG | WEIGHT: 136 LBS | HEART RATE: 60 BPM | TEMPERATURE: 97.1 F | SYSTOLIC BLOOD PRESSURE: 120 MMHG | HEIGHT: 65 IN | BODY MASS INDEX: 22.66 KG/M2

## 2023-11-21 DIAGNOSIS — I44.2 ATRIOVENTRICULAR BLOCK, COMPLETE: ICD-10-CM

## 2023-11-21 DIAGNOSIS — I48.91 UNSPECIFIED ATRIAL FIBRILLATION: ICD-10-CM

## 2023-11-21 DIAGNOSIS — Z45.018 ENCOUNTER FOR ADJUSTMENT AND MANAGEMENT OF OTHER PART OF CARDIAC PACEMAKER: ICD-10-CM

## 2023-11-21 DIAGNOSIS — I48.92 UNSPECIFIED ATRIAL FLUTTER: ICD-10-CM

## 2023-11-21 PROCEDURE — 93280 PM DEVICE PROGR EVAL DUAL: CPT

## 2023-11-21 PROCEDURE — 93000 ELECTROCARDIOGRAM COMPLETE: CPT | Mod: 59

## 2023-11-21 RX ORDER — CLOBETASOL PROPIONATE 0.5 MG/G
0.05 CREAM TOPICAL
Qty: 30 | Refills: 0 | Status: ACTIVE | COMMUNITY
Start: 2023-02-09

## 2023-11-21 RX ORDER — AMOXICILLIN 500 MG/1
500 CAPSULE ORAL
Qty: 14 | Refills: 0 | Status: DISCONTINUED | COMMUNITY
Start: 2023-02-09 | End: 2023-11-21

## 2023-11-21 RX ORDER — PSYLLIUM HUSK 0.4 G
CAPSULE ORAL DAILY
Refills: 0 | Status: DISCONTINUED | COMMUNITY
End: 2023-11-21

## 2023-11-21 RX ORDER — LOSARTAN POTASSIUM 50 MG/1
50 TABLET, FILM COATED ORAL
Qty: 90 | Refills: 0 | Status: ACTIVE | COMMUNITY
Start: 2023-02-13

## 2023-11-21 RX ORDER — FAMOTIDINE 40 MG/1
40 TABLET, FILM COATED ORAL DAILY
Refills: 0 | Status: ACTIVE | COMMUNITY

## 2023-11-21 RX ORDER — ATORVASTATIN CALCIUM 10 MG/1
10 TABLET, FILM COATED ORAL EVERY OTHER DAY
Refills: 0 | Status: ACTIVE | COMMUNITY

## 2023-11-21 RX ORDER — TROSPIUM CHLORIDE 60 MG/1
60 CAPSULE, EXTENDED RELEASE ORAL DAILY
Refills: 0 | Status: ACTIVE | COMMUNITY

## 2023-11-21 RX ORDER — ALBUTEROL SULFATE 90 UG/1
108 (90 BASE) INHALANT RESPIRATORY (INHALATION)
Qty: 8 | Refills: 0 | Status: ACTIVE | COMMUNITY
Start: 2023-02-09

## 2023-11-21 RX ORDER — METOPROLOL TARTRATE 50 MG/1
50 TABLET, FILM COATED ORAL
Qty: 180 | Refills: 3 | Status: ACTIVE | COMMUNITY

## 2023-11-24 PROBLEM — I44.2 CHB (COMPLETE HEART BLOCK): Status: ACTIVE | Noted: 2018-01-26

## 2023-12-26 RX ORDER — FLUTICASONE FUROATE AND VILANTEROL TRIFENATATE 100; 25 UG/1; UG/1
100-25 POWDER RESPIRATORY (INHALATION)
Qty: 90 | Refills: 3 | Status: ACTIVE | COMMUNITY
Start: 2023-10-29 | End: 1900-01-01

## 2024-01-26 ENCOUNTER — EMERGENCY (EMERGENCY)
Facility: HOSPITAL | Age: 79
LOS: 0 days | Discharge: HOME | End: 2024-01-27
Attending: EMERGENCY MEDICINE
Payer: MEDICARE

## 2024-01-26 VITALS
DIASTOLIC BLOOD PRESSURE: 67 MMHG | TEMPERATURE: 98 F | OXYGEN SATURATION: 99 % | SYSTOLIC BLOOD PRESSURE: 147 MMHG | HEART RATE: 60 BPM | RESPIRATION RATE: 18 BRPM | WEIGHT: 173.06 LBS

## 2024-01-26 DIAGNOSIS — J45.909 UNSPECIFIED ASTHMA, UNCOMPLICATED: ICD-10-CM

## 2024-01-26 DIAGNOSIS — R53.1 WEAKNESS: ICD-10-CM

## 2024-01-26 DIAGNOSIS — Z95.0 PRESENCE OF CARDIAC PACEMAKER: ICD-10-CM

## 2024-01-26 DIAGNOSIS — Z95.0 PRESENCE OF CARDIAC PACEMAKER: Chronic | ICD-10-CM

## 2024-01-26 DIAGNOSIS — I48.92 UNSPECIFIED ATRIAL FLUTTER: ICD-10-CM

## 2024-01-26 DIAGNOSIS — E78.5 HYPERLIPIDEMIA, UNSPECIFIED: ICD-10-CM

## 2024-01-26 DIAGNOSIS — Z86.73 PERSONAL HISTORY OF TRANSIENT ISCHEMIC ATTACK (TIA), AND CEREBRAL INFARCTION WITHOUT RESIDUAL DEFICITS: ICD-10-CM

## 2024-01-26 DIAGNOSIS — Z98.890 OTHER SPECIFIED POSTPROCEDURAL STATES: Chronic | ICD-10-CM

## 2024-01-26 DIAGNOSIS — I10 ESSENTIAL (PRIMARY) HYPERTENSION: ICD-10-CM

## 2024-01-26 DIAGNOSIS — Z88.8 ALLERGY STATUS TO OTHER DRUGS, MEDICAMENTS AND BIOLOGICAL SUBSTANCES STATUS: ICD-10-CM

## 2024-01-26 DIAGNOSIS — E86.0 DEHYDRATION: ICD-10-CM

## 2024-01-26 DIAGNOSIS — Z79.01 LONG TERM (CURRENT) USE OF ANTICOAGULANTS: ICD-10-CM

## 2024-01-26 DIAGNOSIS — Z88.2 ALLERGY STATUS TO SULFONAMIDES: ICD-10-CM

## 2024-01-26 PROCEDURE — 99283 EMERGENCY DEPT VISIT LOW MDM: CPT | Mod: 25

## 2024-01-26 PROCEDURE — 81001 URINALYSIS AUTO W/SCOPE: CPT

## 2024-01-26 PROCEDURE — 83735 ASSAY OF MAGNESIUM: CPT

## 2024-01-26 PROCEDURE — 85025 COMPLETE CBC W/AUTO DIFF WBC: CPT

## 2024-01-26 PROCEDURE — 87086 URINE CULTURE/COLONY COUNT: CPT

## 2024-01-26 PROCEDURE — 99284 EMERGENCY DEPT VISIT MOD MDM: CPT

## 2024-01-26 PROCEDURE — 36415 COLL VENOUS BLD VENIPUNCTURE: CPT

## 2024-01-26 PROCEDURE — 80053 COMPREHEN METABOLIC PANEL: CPT

## 2024-01-26 PROCEDURE — 93005 ELECTROCARDIOGRAM TRACING: CPT

## 2024-01-26 NOTE — ED ADULT TRIAGE NOTE - BP NONINVASIVE DIASTOLIC (MM HG)
US of right lower extremity scheduled for 1:30 pm today at [facility]. Pt informed and instructed for exam. Results to be called to Dr. Riggs and pt to remain in waiting room after exam.    67

## 2024-01-27 LAB
ALBUMIN SERPL ELPH-MCNC: 4.1 G/DL — SIGNIFICANT CHANGE UP (ref 3.5–5.2)
ALP SERPL-CCNC: 83 U/L — SIGNIFICANT CHANGE UP (ref 30–115)
ALT FLD-CCNC: 24 U/L — SIGNIFICANT CHANGE UP (ref 0–41)
ANION GAP SERPL CALC-SCNC: 12 MMOL/L — SIGNIFICANT CHANGE UP (ref 7–14)
APPEARANCE UR: CLEAR — SIGNIFICANT CHANGE UP
AST SERPL-CCNC: 41 U/L — SIGNIFICANT CHANGE UP (ref 0–41)
BACTERIA # UR AUTO: NEGATIVE /HPF — SIGNIFICANT CHANGE UP
BASOPHILS # BLD AUTO: 0.07 K/UL — SIGNIFICANT CHANGE UP (ref 0–0.2)
BASOPHILS NFR BLD AUTO: 0.9 % — SIGNIFICANT CHANGE UP (ref 0–1)
BILIRUB SERPL-MCNC: 0.4 MG/DL — SIGNIFICANT CHANGE UP (ref 0.2–1.2)
BILIRUB UR-MCNC: NEGATIVE — SIGNIFICANT CHANGE UP
BUN SERPL-MCNC: 33 MG/DL — HIGH (ref 10–20)
CALCIUM SERPL-MCNC: 9.2 MG/DL — SIGNIFICANT CHANGE UP (ref 8.4–10.5)
CAST: 0 /LPF — SIGNIFICANT CHANGE UP (ref 0–4)
CHLORIDE SERPL-SCNC: 100 MMOL/L — SIGNIFICANT CHANGE UP (ref 98–110)
CO2 SERPL-SCNC: 20 MMOL/L — SIGNIFICANT CHANGE UP (ref 17–32)
COLOR SPEC: YELLOW — SIGNIFICANT CHANGE UP
CREAT SERPL-MCNC: 1.4 MG/DL — SIGNIFICANT CHANGE UP (ref 0.7–1.5)
DIFF PNL FLD: NEGATIVE — SIGNIFICANT CHANGE UP
EGFR: 38 ML/MIN/1.73M2 — LOW
EOSINOPHIL # BLD AUTO: 0.12 K/UL — SIGNIFICANT CHANGE UP (ref 0–0.7)
EOSINOPHIL NFR BLD AUTO: 1.5 % — SIGNIFICANT CHANGE UP (ref 0–8)
GLUCOSE SERPL-MCNC: 99 MG/DL — SIGNIFICANT CHANGE UP (ref 70–99)
GLUCOSE UR QL: NEGATIVE MG/DL — SIGNIFICANT CHANGE UP
HCT VFR BLD CALC: 32 % — LOW (ref 37–47)
HGB BLD-MCNC: 10.5 G/DL — LOW (ref 12–16)
IMM GRANULOCYTES NFR BLD AUTO: 0.4 % — HIGH (ref 0.1–0.3)
KETONES UR-MCNC: NEGATIVE MG/DL — SIGNIFICANT CHANGE UP
LEUKOCYTE ESTERASE UR-ACNC: ABNORMAL
LYMPHOCYTES # BLD AUTO: 2.16 K/UL — SIGNIFICANT CHANGE UP (ref 1.2–3.4)
LYMPHOCYTES # BLD AUTO: 26.8 % — SIGNIFICANT CHANGE UP (ref 20.5–51.1)
MAGNESIUM SERPL-MCNC: 2.2 MG/DL — SIGNIFICANT CHANGE UP (ref 1.8–2.4)
MCHC RBC-ENTMCNC: 29.7 PG — SIGNIFICANT CHANGE UP (ref 27–31)
MCHC RBC-ENTMCNC: 32.8 G/DL — SIGNIFICANT CHANGE UP (ref 32–37)
MCV RBC AUTO: 90.4 FL — SIGNIFICANT CHANGE UP (ref 81–99)
MONOCYTES # BLD AUTO: 0.74 K/UL — HIGH (ref 0.1–0.6)
MONOCYTES NFR BLD AUTO: 9.2 % — SIGNIFICANT CHANGE UP (ref 1.7–9.3)
NEUTROPHILS # BLD AUTO: 4.93 K/UL — SIGNIFICANT CHANGE UP (ref 1.4–6.5)
NEUTROPHILS NFR BLD AUTO: 61.2 % — SIGNIFICANT CHANGE UP (ref 42.2–75.2)
NITRITE UR-MCNC: NEGATIVE — SIGNIFICANT CHANGE UP
NRBC # BLD: 0 /100 WBCS — SIGNIFICANT CHANGE UP (ref 0–0)
PH UR: 6 — SIGNIFICANT CHANGE UP (ref 5–8)
PLATELET # BLD AUTO: 219 K/UL — SIGNIFICANT CHANGE UP (ref 130–400)
PMV BLD: 11.8 FL — HIGH (ref 7.4–10.4)
POTASSIUM SERPL-MCNC: 5.6 MMOL/L — HIGH (ref 3.5–5)
POTASSIUM SERPL-SCNC: 5.6 MMOL/L — HIGH (ref 3.5–5)
PROT SERPL-MCNC: 6.9 G/DL — SIGNIFICANT CHANGE UP (ref 6–8)
PROT UR-MCNC: NEGATIVE MG/DL — SIGNIFICANT CHANGE UP
RBC # BLD: 3.54 M/UL — LOW (ref 4.2–5.4)
RBC # FLD: 15.5 % — HIGH (ref 11.5–14.5)
RBC CASTS # UR COMP ASSIST: 0 /HPF — SIGNIFICANT CHANGE UP (ref 0–4)
SODIUM SERPL-SCNC: 132 MMOL/L — LOW (ref 135–146)
SP GR SPEC: 1.01 — SIGNIFICANT CHANGE UP (ref 1–1.03)
SQUAMOUS # UR AUTO: 2 /HPF — SIGNIFICANT CHANGE UP (ref 0–5)
UROBILINOGEN FLD QL: 0.2 MG/DL — SIGNIFICANT CHANGE UP (ref 0.2–1)
WBC # BLD: 8.05 K/UL — SIGNIFICANT CHANGE UP (ref 4.8–10.8)
WBC # FLD AUTO: 8.05 K/UL — SIGNIFICANT CHANGE UP (ref 4.8–10.8)
WBC UR QL: 10 /HPF — HIGH (ref 0–5)

## 2024-01-27 PROCEDURE — 93010 ELECTROCARDIOGRAM REPORT: CPT

## 2024-01-27 RX ORDER — NITROFURANTOIN MACROCRYSTAL 50 MG
1 CAPSULE ORAL
Qty: 10 | Refills: 0
Start: 2024-01-27 | End: 2024-01-31

## 2024-01-27 RX ORDER — SODIUM CHLORIDE 9 MG/ML
1000 INJECTION, SOLUTION INTRAVENOUS ONCE
Refills: 0 | Status: COMPLETED | OUTPATIENT
Start: 2024-01-27 | End: 2024-01-27

## 2024-01-27 RX ADMIN — SODIUM CHLORIDE 1000 MILLILITER(S): 9 INJECTION, SOLUTION INTRAVENOUS at 02:24

## 2024-01-27 NOTE — ED PROVIDER NOTE - OBJECTIVE STATEMENT
78-year-old female with past medical history of hyponatremia, A-flutter on Xarelto, HTN, HLD, PPM, presents to the ED complaining of low energy and feeling tired for approximately 4-5 days.  Patient reports she has felt this way in the past and was found to be hyponatremic.  Patient maintains that she has been eating and drinking normally.  Patient has not had any recent fever, headache, difficulty breathing, chest pain, nausea, vomiting, or diarrhea.

## 2024-01-27 NOTE — ED PROVIDER NOTE - NSFOLLOWUPINSTRUCTIONS_ED_ALL_ED_FT
Follow up with your primary care doctor.       Dehydration, Adult  Dehydration is a condition in which there is not enough water or other fluids in the body. This happens when a person loses more fluids than they take in. Important organs, such as the kidneys, brain, and heart, cannot function without a proper amount of fluids. Any loss of fluids from the body can lead to dehydration.    Dehydration can be mild, moderate, or severe. It should be treated right away to prevent it from becoming severe.    What are the causes?  Dehydration may be caused by:  Health conditions, such as diarrhea, vomiting, fever, infection, or sweating or urinating a lot.  Not drinking enough fluids.  Certain medicines, such as medicines that remove excess fluid from the body (diuretics).  Lack of safe drinking water.  Not being able to get enough water and food.  What increases the risk?  The following factors may make you more likely to develop this condition:  Having a long-term (chronic) illness that has not been treated properly, such as diabetes, heart disease, or kidney disease.  Being 65 years of age or older.  Having a disability.  Living in a place that is high in altitude, where thinner, drier air causes more fluid loss.  Doing exercises that put stress on your body for a long time (endurance sports).  Being active in a hot climate.  What are the signs or symptoms?  Symptoms of dehydration depend on how severe it is.    Mild or moderate dehydration    Thirst.  Dry lips or dry mouth.  Dizziness or light-headedness.  Muscle cramps.  Dark urine. Urine may be the color of tea.  Less urine or tears produced than usual.  Headache.  Severe dehydration    Changes in skin. Your skin may be cold and clammy, blotchy, or pale. Your skin also may not return to normal after being lightly pinched and released.  Little or no tears, urine, or sweat.  Rapid breathing and low blood pressure. Your pulse may be weak or may be faster than 100 beats per minute when you are sitting still.  Other changes, such as:  Feeling very thirsty.  Sunken eyes.  Cold hands and feet.  Confusion.  Being very tired (lethargic) or having trouble waking from sleep.  Short-term weight loss.  Loss of consciousness.  How is this diagnosed?  This condition is diagnosed based on your symptoms and a physical exam. You may have blood and urine tests to help confirm the diagnosis.    How is this treated?  A person's hand, showing an inserted IV catheter.   Treatment for this condition depends on how severe it is. Treatment should be started right away. Do not wait until dehydration becomes severe. Severe dehydration is an emergency and needs to be treated in a hospital.  Mild or moderate dehydration can be treated at home. You may be asked to:  Drink more fluids.  Drink an oral rehydration solution (ORS). This drink restores fluids, salts, and minerals in the blood (electrolytes).  Stop any activities that caused dehydration, such as exercise.  Cool off with cool compresses, cool mist, or cool fluids, if heat or too much sweat caused your condition.  Take medicine to treat fever, if fever caused your condition.  Take medicine to treat nausea and diarrhea, if vomiting or diarrhea caused your condition.  Severe dehydration can be treated:  With IV fluids.  By correcting abnormal levels of electrolytes in your body.  By treating the underlying cause of dehydration.  Follow these instructions at home:  Oral rehydration solution    A glass of water with a spoonful of ORS ready to be added to it.  If told by your health care provider, drink an ORS:  Make an ORS by following instructions on the package.  Start by drinking small amounts, about ½ cup (120 mL) every 5–10 minutes.  Slowly increase how much you drink until you have taken the amount recommended by your health care provider.  Eating and drinking    A person drinking water from a glass.   Drink enough clear fluid to keep your urine pale yellow. If you were told to drink an ORS, finish the ORS first and then start slowly drinking other clear fluids. Drink fluids such as:  Water. Do not drink only water. Doing that can lead to hyponatremia, which is having too little salt (sodium) in the body.  Water from ice chips you suck on.  Diluted fruit juice. This is fruit juice that you have added water to.  Low-calorie sports drinks.  Eat foods that contain a healthy balance of electrolytes, such as bananas, oranges, potatoes, tomatoes, and spinach.  Do not drink alcohol.  Avoid the following:  Drinks that contain a lot of sugar. These include high-calorie sports drinks, fruit juice that is not diluted, and soda.  Caffeine.  Foods that are greasy or contain a lot of fat or sugar.  General instructions    Take over-the-counter and prescription medicines only as told by your health care provider.  Do not take sodium tablets. Doing that can lead to having too much sodium in the body (hypernatremia).  Return to your normal activities as told by your health care provider. Ask your health care provider what activities are safe for you.  Keep all follow-up visits. Your health care provider may need to check your progress and suggest new ways to treat your condition.  Contact a health care provider if:  You have muscle cramps, pain, or discomfort, such as:  Pain in your abdomen and the pain gets worse or stays in one area.  Stiff neck.  You have a rash.  You are more irritable than usual.  You are sleepier or have a harder time waking.  You feel weak or dizzy.  You feel very thirsty.  Get help right away if:  You have symptoms of severe dehydration.  You vomit every time you eat or drink.  Your vomiting gets worse, does not go away, or includes blood or green matter (bile).  You are getting treatment but symptoms are getting worse.  You have a fever.  You have a severe headache.  You have:  Diarrhea that gets worse or does not go away.  Blood in your stool. This may cause stool to look black and tarry.  Not urinating, or urinating only a small amount of very dark urine, within 6–8 hours.  You have trouble breathing.  These symptoms may be an emergency. Get help right away.  Do not wait to see if the symptoms will go away.  Do not drive yourself to the hospital. Call 911.  This information is not intended to replace advice given to you by your health care provider. Make sure you discuss any questions you have with your health care provider.

## 2024-01-27 NOTE — ED PROVIDER NOTE - CLINICAL SUMMARY MEDICAL DECISION MAKING FREE TEXT BOX
Patient evaluated for weakness, labs reviewed, patient reports feeling well, urine culture sent, UA with 10 white cells.  Advised unlikely UTI but given patient preference will give prescription to watch and wait as culture pending.  Patient advised to follow-up closely with PMD for reevaluation and agreed.  Strict return precautions advised and patient and family member at bedside verbalized understanding.

## 2024-01-27 NOTE — ED PROVIDER NOTE - ATTENDING CONTRIBUTION TO CARE
78-year-old female with PMH CVA, HLD, atrial flutter on Xarelto, asthma, HLD presents for evaluation of weakness.  Patient states she felt a little bit of weakness over the past day or so and was concerned that her sodium was low so came to ED to get it checked.  Patient states she has had hyponatremia in the past and wanted to make sure it did not recur.  Denies any headache, dizziness, chest pain, shortness of breath, exertional dyspnea, fevers, chills or urinary complaints.  Tolerating p.o. as usual.  No nausea, vomiting, diarrhea or abdominal pain.     VITAL SIGNS: noted  CONSTITUTIONAL: Well-developed; well-nourished; in no acute distress  HEAD: Normocephalic; atraumatic  EYES: PERRL, EOM intact; conjunctiva and sclera clear  ENT: No nasal discharge; airway clear. MMM  NECK: Supple; non tender. No anterior cervical lymphadenopathy noted  CARD: S1, S2 normal; no murmurs, gallops, or rubs. Regular rate and rhythm  RESP: CTAB/L, no wheezes, rales or rhonchi  ABD: Normal bowel sounds; soft; non-distended; non-tender; no hepatosplenomegaly. No CVA tenderness  EXT: Normal ROM. No calf tenderness or edema. Distal pulses intact  NEURO: Alert, oriented. Grossly unremarkable. No focal deficits  SKIN: Skin exam is warm and dry, no acute rash  MS: No midline spinal tenderness

## 2024-01-27 NOTE — ED ADULT NURSE NOTE - NSFALLHARMRISKINTERV_ED_ALL_ED

## 2024-01-27 NOTE — ED PROVIDER NOTE - PROGRESS NOTE DETAILS
AE: Patient is well appearing and feels better. Discussed results with patient and family; shared decision making done regarding abx for possible UTI. Patient is amenable to discharge with prescription for abx and follow up with PMD.

## 2024-01-27 NOTE — ED PROVIDER NOTE - PHYSICAL EXAMINATION
PHYSICAL EXAM: I have reviewed current vital signs.  GENERAL: NAD, well-nourished; well-developed.  HEAD:  Normocephalic, atraumatic.  EYES: EOMI, PERRL, conjunctiva and sclera clear.  ENT: MMM, no erythema/exudates.  NECK: Supple, no JVD.  CHEST/LUNG: Clear to auscultation bilaterally; no wheezes, rales, or rhonchi.  HEART: Regular rate and rhythm, normal S1 and S2; no murmurs, rubs, or gallops.  ABDOMEN: Soft, nontender, nondistended.  EXTREMITIES:  2+ peripheral pulses; no clubbing, cyanosis, or edema.  PSYCH: Cooperative, appropriate, normal mood and affect.  NEUROLOGY: A&O x 3. Motor 5/5. Sensory intact. No focal neurological deficits.   SKIN: Warm and dry.

## 2024-01-27 NOTE — ED PROVIDER NOTE - PATIENT PORTAL LINK FT
You can access the FollowMyHealth Patient Portal offered by [facility] by registering at the following website: [email or web address]. By joining [employer]’s FollowMyHealth portal, you will also be able to view your health information using other applications (apps) compatible with our system.

## 2024-01-28 LAB
CULTURE RESULTS: SIGNIFICANT CHANGE UP
SPECIMEN SOURCE: SIGNIFICANT CHANGE UP

## 2024-02-16 NOTE — ED ADULT NURSE NOTE - NSINTERVENTIONOPT_GEN_ALL_ED
GYN     Last Seen: 05.04.22    Next Appt: N/A    C/O  Constant Vaginal Bleeding since Jan.       Pt has been having bleeding since January that has been light but for the past fews days bleeding has increased. Pt has been bleeding through a pad or a tampon with in or under an hour. Pt is light headed and dizzy. Pt stated she feels like she is going to die.    Advised pt to go to [facility] ER to be evaluated. Informed pt concerned she might be hypovolemic. Pt disagreeable stating the last time she was at the ER they did not do anything for her.     Reached out to a provider in office Deepa MATHIS, she discussed pt concerns and pt agreed to go to the ER and that someone would drive her.     Please Advise     Stretcher Alarms

## 2024-02-17 ENCOUNTER — EMERGENCY (EMERGENCY)
Facility: HOSPITAL | Age: 79
LOS: 0 days | Discharge: HOME | End: 2024-02-18
Attending: STUDENT IN AN ORGANIZED HEALTH CARE EDUCATION/TRAINING PROGRAM
Payer: MEDICARE

## 2024-02-17 VITALS
WEIGHT: 134.92 LBS | TEMPERATURE: 98 F | SYSTOLIC BLOOD PRESSURE: 174 MMHG | HEART RATE: 67 BPM | RESPIRATION RATE: 18 BRPM | HEIGHT: 64 IN | DIASTOLIC BLOOD PRESSURE: 88 MMHG | OXYGEN SATURATION: 99 %

## 2024-02-17 DIAGNOSIS — R53.1 WEAKNESS: ICD-10-CM

## 2024-02-17 DIAGNOSIS — I48.92 UNSPECIFIED ATRIAL FLUTTER: ICD-10-CM

## 2024-02-17 DIAGNOSIS — Z88.8 ALLERGY STATUS TO OTHER DRUGS, MEDICAMENTS AND BIOLOGICAL SUBSTANCES: ICD-10-CM

## 2024-02-17 DIAGNOSIS — Z98.890 OTHER SPECIFIED POSTPROCEDURAL STATES: Chronic | ICD-10-CM

## 2024-02-17 DIAGNOSIS — E87.1 HYPO-OSMOLALITY AND HYPONATREMIA: ICD-10-CM

## 2024-02-17 DIAGNOSIS — I10 ESSENTIAL (PRIMARY) HYPERTENSION: ICD-10-CM

## 2024-02-17 DIAGNOSIS — Z88.2 ALLERGY STATUS TO SULFONAMIDES: ICD-10-CM

## 2024-02-17 DIAGNOSIS — Z79.01 LONG TERM (CURRENT) USE OF ANTICOAGULANTS: ICD-10-CM

## 2024-02-17 DIAGNOSIS — R63.8 OTHER SYMPTOMS AND SIGNS CONCERNING FOOD AND FLUID INTAKE: ICD-10-CM

## 2024-02-17 DIAGNOSIS — Z95.0 PRESENCE OF CARDIAC PACEMAKER: Chronic | ICD-10-CM

## 2024-02-17 DIAGNOSIS — Z95.5 PRESENCE OF CORONARY ANGIOPLASTY IMPLANT AND GRAFT: ICD-10-CM

## 2024-02-17 DIAGNOSIS — R53.83 OTHER FATIGUE: ICD-10-CM

## 2024-02-17 DIAGNOSIS — E78.5 HYPERLIPIDEMIA, UNSPECIFIED: ICD-10-CM

## 2024-02-17 LAB
APPEARANCE UR: CLEAR — SIGNIFICANT CHANGE UP
BASOPHILS # BLD AUTO: 0.06 K/UL — SIGNIFICANT CHANGE UP (ref 0–0.2)
BASOPHILS NFR BLD AUTO: 0.6 % — SIGNIFICANT CHANGE UP (ref 0–1)
BILIRUB UR-MCNC: NEGATIVE — SIGNIFICANT CHANGE UP
COLOR SPEC: YELLOW — SIGNIFICANT CHANGE UP
DIFF PNL FLD: NEGATIVE — SIGNIFICANT CHANGE UP
EOSINOPHIL # BLD AUTO: 0.12 K/UL — SIGNIFICANT CHANGE UP (ref 0–0.7)
EOSINOPHIL NFR BLD AUTO: 1.1 % — SIGNIFICANT CHANGE UP (ref 0–8)
GLUCOSE UR QL: NEGATIVE MG/DL — SIGNIFICANT CHANGE UP
HCT VFR BLD CALC: 32.2 % — LOW (ref 37–47)
HGB BLD-MCNC: 10.9 G/DL — LOW (ref 12–16)
IMM GRANULOCYTES NFR BLD AUTO: 0.4 % — HIGH (ref 0.1–0.3)
KETONES UR-MCNC: NEGATIVE MG/DL — SIGNIFICANT CHANGE UP
LEUKOCYTE ESTERASE UR-ACNC: NEGATIVE — SIGNIFICANT CHANGE UP
LYMPHOCYTES # BLD AUTO: 2.56 K/UL — SIGNIFICANT CHANGE UP (ref 1.2–3.4)
LYMPHOCYTES # BLD AUTO: 23.6 % — SIGNIFICANT CHANGE UP (ref 20.5–51.1)
MCHC RBC-ENTMCNC: 30.4 PG — SIGNIFICANT CHANGE UP (ref 27–31)
MCHC RBC-ENTMCNC: 33.9 G/DL — SIGNIFICANT CHANGE UP (ref 32–37)
MCV RBC AUTO: 89.7 FL — SIGNIFICANT CHANGE UP (ref 81–99)
MONOCYTES # BLD AUTO: 1.01 K/UL — HIGH (ref 0.1–0.6)
MONOCYTES NFR BLD AUTO: 9.3 % — SIGNIFICANT CHANGE UP (ref 1.7–9.3)
NEUTROPHILS # BLD AUTO: 7.08 K/UL — HIGH (ref 1.4–6.5)
NEUTROPHILS NFR BLD AUTO: 65 % — SIGNIFICANT CHANGE UP (ref 42.2–75.2)
NITRITE UR-MCNC: NEGATIVE — SIGNIFICANT CHANGE UP
NRBC # BLD: 0 /100 WBCS — SIGNIFICANT CHANGE UP (ref 0–0)
PH UR: 6 — SIGNIFICANT CHANGE UP (ref 5–8)
PLATELET # BLD AUTO: 224 K/UL — SIGNIFICANT CHANGE UP (ref 130–400)
PMV BLD: 11.3 FL — HIGH (ref 7.4–10.4)
PROT UR-MCNC: NEGATIVE MG/DL — SIGNIFICANT CHANGE UP
RBC # BLD: 3.59 M/UL — LOW (ref 4.2–5.4)
RBC # FLD: 15.9 % — HIGH (ref 11.5–14.5)
SP GR SPEC: 1.01 — SIGNIFICANT CHANGE UP (ref 1–1.03)
UROBILINOGEN FLD QL: 0.2 MG/DL — SIGNIFICANT CHANGE UP (ref 0.2–1)
WBC # BLD: 10.87 K/UL — HIGH (ref 4.8–10.8)
WBC # FLD AUTO: 10.87 K/UL — HIGH (ref 4.8–10.8)

## 2024-02-17 PROCEDURE — 80053 COMPREHEN METABOLIC PANEL: CPT

## 2024-02-17 PROCEDURE — 87086 URINE CULTURE/COLONY COUNT: CPT

## 2024-02-17 PROCEDURE — 93005 ELECTROCARDIOGRAM TRACING: CPT

## 2024-02-17 PROCEDURE — 71045 X-RAY EXAM CHEST 1 VIEW: CPT | Mod: 26

## 2024-02-17 PROCEDURE — 36415 COLL VENOUS BLD VENIPUNCTURE: CPT

## 2024-02-17 PROCEDURE — 81003 URINALYSIS AUTO W/O SCOPE: CPT

## 2024-02-17 PROCEDURE — 71045 X-RAY EXAM CHEST 1 VIEW: CPT

## 2024-02-17 PROCEDURE — 99285 EMERGENCY DEPT VISIT HI MDM: CPT | Mod: 25

## 2024-02-17 PROCEDURE — 70450 CT HEAD/BRAIN W/O DYE: CPT | Mod: MA

## 2024-02-17 PROCEDURE — 99285 EMERGENCY DEPT VISIT HI MDM: CPT

## 2024-02-17 PROCEDURE — 96374 THER/PROPH/DIAG INJ IV PUSH: CPT

## 2024-02-17 PROCEDURE — 85025 COMPLETE CBC W/AUTO DIFF WBC: CPT

## 2024-02-17 RX ORDER — SODIUM CHLORIDE 9 MG/ML
1000 INJECTION, SOLUTION INTRAVENOUS ONCE
Refills: 0 | Status: COMPLETED | OUTPATIENT
Start: 2024-02-17 | End: 2024-02-17

## 2024-02-17 RX ADMIN — SODIUM CHLORIDE 1000 MILLILITER(S): 9 INJECTION, SOLUTION INTRAVENOUS at 23:39

## 2024-02-17 NOTE — ED ADULT NURSE NOTE - OBJECTIVE STATEMENT
Pt BIBA c/o weakness and lethargy x 3 weeks. Pt states she was here several weeks ago for dehydration but symptoms have still not resolved. PMH several episodes of hyponatremia leading to a stroke in 2022

## 2024-02-18 VITALS
DIASTOLIC BLOOD PRESSURE: 85 MMHG | SYSTOLIC BLOOD PRESSURE: 168 MMHG | RESPIRATION RATE: 18 BRPM | HEART RATE: 69 BPM | TEMPERATURE: 98 F | OXYGEN SATURATION: 99 %

## 2024-02-18 LAB
ALBUMIN SERPL ELPH-MCNC: 4.3 G/DL — SIGNIFICANT CHANGE UP (ref 3.5–5.2)
ALP SERPL-CCNC: 93 U/L — SIGNIFICANT CHANGE UP (ref 30–115)
ALT FLD-CCNC: 27 U/L — SIGNIFICANT CHANGE UP (ref 0–41)
ANION GAP SERPL CALC-SCNC: 12 MMOL/L — SIGNIFICANT CHANGE UP (ref 7–14)
AST SERPL-CCNC: 27 U/L — SIGNIFICANT CHANGE UP (ref 0–41)
BILIRUB SERPL-MCNC: 0.5 MG/DL — SIGNIFICANT CHANGE UP (ref 0.2–1.2)
BUN SERPL-MCNC: 24 MG/DL — HIGH (ref 10–20)
CALCIUM SERPL-MCNC: 9.3 MG/DL — SIGNIFICANT CHANGE UP (ref 8.4–10.5)
CHLORIDE SERPL-SCNC: 94 MMOL/L — LOW (ref 98–110)
CO2 SERPL-SCNC: 21 MMOL/L — SIGNIFICANT CHANGE UP (ref 17–32)
CREAT SERPL-MCNC: 1.1 MG/DL — SIGNIFICANT CHANGE UP (ref 0.7–1.5)
EGFR: 51 ML/MIN/1.73M2 — LOW
GLUCOSE SERPL-MCNC: 106 MG/DL — HIGH (ref 70–99)
POTASSIUM SERPL-MCNC: 4.8 MMOL/L — SIGNIFICANT CHANGE UP (ref 3.5–5)
POTASSIUM SERPL-SCNC: 4.8 MMOL/L — SIGNIFICANT CHANGE UP (ref 3.5–5)
PROT SERPL-MCNC: 6.9 G/DL — SIGNIFICANT CHANGE UP (ref 6–8)
SODIUM SERPL-SCNC: 127 MMOL/L — LOW (ref 135–146)

## 2024-02-18 PROCEDURE — 70450 CT HEAD/BRAIN W/O DYE: CPT | Mod: 26,MA

## 2024-02-18 PROCEDURE — 93010 ELECTROCARDIOGRAM REPORT: CPT

## 2024-02-18 RX ORDER — ONDANSETRON 8 MG/1
4 TABLET, FILM COATED ORAL ONCE
Refills: 0 | Status: COMPLETED | OUTPATIENT
Start: 2024-02-18 | End: 2024-02-18

## 2024-02-18 RX ORDER — SODIUM CHLORIDE 9 MG/ML
1000 INJECTION INTRAMUSCULAR; INTRAVENOUS; SUBCUTANEOUS ONCE
Refills: 0 | Status: COMPLETED | OUTPATIENT
Start: 2024-02-18 | End: 2024-02-18

## 2024-02-18 RX ADMIN — ONDANSETRON 4 MILLIGRAM(S): 8 TABLET, FILM COATED ORAL at 01:33

## 2024-02-18 RX ADMIN — SODIUM CHLORIDE 1000 MILLILITER(S): 9 INJECTION INTRAMUSCULAR; INTRAVENOUS; SUBCUTANEOUS at 01:33

## 2024-02-18 NOTE — ED PROVIDER NOTE - PROGRESS NOTE DETAILS
pk: all results d/w pt & copies given, strict return precautions discussed, rec outpt follow up with primary caer physician. shared decision making conversation had with son and daughter who agree with plan.

## 2024-02-18 NOTE — ED PROVIDER NOTE - CLINICAL SUMMARY MEDICAL DECISION MAKING FREE TEXT BOX
78-year-old female presents to the emergency department with fatigue.  Patient well-appearing on physical exam. Labs and EKG were ordered and reviewed. No signs of infection. Pt w/ mild hyponatremia. Imaging was ordered and reviewed by me.  Appropriate medications for patient's presenting complaints were ordered and effects were reassessed.  Patient's records (prior hospital, ED visit, and/or nursing home notes if available) were reviewed.  Additional history was obtained from EMS, family, and/or PCP (where available).  Escalation to admission/observation was considered.  However patient feels much better and is comfortable with discharge.  Appropriate follow-up was arranged. Return precautions discussed in detail.

## 2024-02-18 NOTE — ED PROVIDER NOTE - PATIENT PORTAL LINK FT
You can access the FollowMyHealth Patient Portal offered by [facility] by registering at the following website: [email or web address]. By joining [employer]’s FollowMyHealth portal, you will also be able to view your health information using other applications (apps) compatible with our system.

## 2024-02-18 NOTE — ED PROVIDER NOTE - EKG #1 DATE/TIME
SPOKE WITH PATIENT AND HER  AFTER HER BIOPSY AND DISCUSSED DISCHARGE INSTRUCTIONS WITH [employer] FOR PAIN CONTROL AND THEY V/U. I ANSWERED ALL QUESTIONS TO THEIR SATISFACTION. I GAVE MY CARD WITH MY CONTACT INFORMATION AND ENCOURAGED THEM TO CALL WITH ANY QUESTIONS OR CONCERNS AND THEY V/U  
18-Feb-2024 02:07

## 2024-02-18 NOTE — ED PROVIDER NOTE - OBJECTIVE STATEMENT
Patient is 78-year-old female with past medical history of hyponatremia, A-flutter on Xarelto, HTN, HLD, PPM, presents to the ED complaining of low energy and feeling tired for approximately 4-5 days.  Patient reports she has felt this way in the past and was found to be hyponatremic. family bedside states that the pt has been eating and drinking less for the last two weeks. Otherwise denies any fever, chills, headache, changes in vision, cough, congestion, cp, palpitations, sob, n/v/d, abd pain, constipation, urinary complaints, lower extremity pain/swelling.

## 2024-02-18 NOTE — ED PROVIDER NOTE - DISPOSITION TYPE
/Monitor tech informed of pt. Runs of VT and pauses. Dr. Shun Lopez on the unit aware of ectopy. EKG 12 Lead ordered and completed EKG shown to Dr. Shun Lopez. Infectious disease consulted. Cefepime ordered and infusing now. Set up for breakfast. ECHO now tech at bedside. Bed alarm intact, call light in reach. DISCHARGE

## 2024-02-18 NOTE — ED PROVIDER NOTE - ATTENDING CONTRIBUTION TO CARE
78-year-old female past medical history hyponatremia, a flutter on Xarelto, hypertension, hyperlipidemia, status post PPM presents to the emergency department for evaluation of fatigue for the past 5 days.  Patient reports in the past she felt like this and was hyponatremic.  Patient reports mild decrease in p.o. intake over the past 2 weeks.  Patient denies fever, nausea, vomiting, diarrhea, bloody stools, chest pain, shortness of breath, abdominal pain.    Vital Signs: I have reviewed the initial vital signs.  Constitutional: Patient in no acute distress.  Integumentary: No rash.  ENT: MMM  NECK: Supple.   Cardiovascular: RRR, radial pulses 2/4 bilaterally.   Respiratory: Breath sounds CTA b/l, no wheezing or crackles, good air exchange, good resp effort and excursion, no accessory muscle use, no stridor.  Gastrointestinal: Abdomen soft, non-tender, non-distended, no rebound tenderness or guarding, no CVAT.   Musculoskeletal: FROM, no edema, no calf pain/swelling/erythema.  Neurologic: AAOx3, motor 5/5 and sensation intact throughout upper and lower ext, CN II-XII intact, No facial droop or slurring of speech. No focal deficits.

## 2024-02-19 LAB
CULTURE RESULTS: SIGNIFICANT CHANGE UP
SPECIMEN SOURCE: SIGNIFICANT CHANGE UP

## 2024-02-21 ENCOUNTER — NON-APPOINTMENT (OUTPATIENT)
Age: 79
End: 2024-02-21

## 2024-02-22 ENCOUNTER — APPOINTMENT (OUTPATIENT)
Dept: CARDIOLOGY | Facility: CLINIC | Age: 79
End: 2024-02-22
Payer: MEDICARE

## 2024-02-22 PROCEDURE — 93296 REM INTERROG EVL PM/IDS: CPT

## 2024-02-22 PROCEDURE — 93294 REM INTERROG EVL PM/LDLS PM: CPT

## 2024-04-03 NOTE — PROCEDURE
[de-identified] : [place] [facility] [de-identified] : [id number] [de-identified] : Accortizde [de-identified] : 1/29/18 [de-identified] : 60 [de-identified] :  68 % AP 84%

## 2024-04-03 NOTE — ASSESSMENT
[FreeTextEntry1] : Dual chamber PPM for CHB Interrogation showed stable parameters  PAF -  on 1 year data showed 0 recurrence Revisit long term use of flecanide.  Dr. Hinton came in the room and discussed that flecanied is safe just need to have stress test yearly In her case her afib is well under controlled by meds. 2023 echo - LVEF is normal as well nuclear stress test/MPI showed no infarct/ischemia PLAN continue flecanide cont xarelto 20mg daily - denies any issue of bleeding cont metoprolol tartrate 50mg 3x daily.  BP is well controlled She is enrolled in remote monitoring every 90 days  RTO in 9 months and prn

## 2024-04-03 NOTE — HISTORY OF PRESENT ILLNESS
[Palpitations] : no palpitations [SOB] : no dyspnea [Swelling at Site] : no swelling at device site [Erythema at Site] : no erythema at device site [de-identified] : \par  \par  \par    [FreeTextEntry1] : 78 y/o with pmh of  MVR MAZE, AF/AFL s/p AF ablation x2, recurrent Afib/ Fl , rhythm controlled with  Flecainide, CHB , she underwent a Dual Chamber PPM implantation on 1/29/2018 TIA 2002    PCP: Dr. Cota ( [phone] Cardiologist:  Dr. Christian  08/16/22: Feels fine.  Denies chest pain, shortness of breath, palpitation, dizziness or LOC except noted above.  EKG (08/16/22): AP@62

## 2024-04-03 NOTE — CARDIOLOGY SUMMARY
[de-identified] : ECG 60 bpm V paced in sinus EKG (02/21/2023): Sinus rhythm at 64 bpm with first degree AV block.   [de-identified] : CTA 8/18 - normal [de-identified] : 8/2/2023 - LVEF 50-55% severe TR JAXSON (5/15/2018)- EF 55-60%, mod AR, mild MR , mild - mod TR  [de-identified] : Nuclear 6/17/17 - normal EF 60% 7/21/2023 - MPI sestamibi and adenosine- normal, no infarct or ischemia [de-identified] : 1/29/2018 - [place] [facility]  Duall chamber PPM

## 2024-04-03 NOTE — HISTORY OF PRESENT ILLNESS
[Palpitations] : no palpitations [SOB] : no dyspnea [Swelling at Site] : no swelling at device site [Erythema at Site] : no erythema at device site [de-identified] : \par  \par  \par    [FreeTextEntry1] : 76 y/o with pmh of  MVR MAZE, AF/AFL s/p AF ablation x2, recurrent Afib/ Fl , rhythm controlled with  Flecainide, CHB , she underwent a Dual Chamber PPM implantation on 1/29/2018 TIA 2002    PCP: Dr. Cota ( [phone] Cardiologist:  Dr. Christian  08/16/22: Feels fine.  Denies chest pain, shortness of breath, palpitation, dizziness or LOC except noted above.  EKG (08/16/22): AP@62

## 2024-04-03 NOTE — PROCEDURE
[de-identified] : [place] [facility] [de-identified] : [id number] [de-identified] : Accortizde [de-identified] : 1/29/18 [de-identified] :  68 % AP 84% [de-identified] : 60

## 2024-04-03 NOTE — CARDIOLOGY SUMMARY
[de-identified] : ECG 60 bpm V paced in sinus EKG (02/21/2023): Sinus rhythm at 64 bpm with first degree AV block.   [de-identified] : CTA 8/18 - normal [de-identified] : 8/2/2023 - LVEF 50-55% severe TR JAXSON (5/15/2018)- EF 55-60%, mod AR, mild MR , mild - mod TR  [de-identified] : Nuclear 6/17/17 - normal EF 60% 7/21/2023 - MPI sestamibi and adenosine- normal, no infarct or ischemia [de-identified] : 1/29/2018 - [place] [facility]  Duall chamber PPM

## 2024-05-22 ENCOUNTER — NON-APPOINTMENT (OUTPATIENT)
Age: 79
End: 2024-05-22

## 2024-05-23 ENCOUNTER — APPOINTMENT (OUTPATIENT)
Dept: CARDIOLOGY | Facility: CLINIC | Age: 79
End: 2024-05-23
Payer: MEDICARE

## 2024-05-23 PROCEDURE — 93296 REM INTERROG EVL PM/IDS: CPT

## 2024-05-23 PROCEDURE — 93294 REM INTERROG EVL PM/LDLS PM: CPT

## 2024-06-07 ENCOUNTER — NON-APPOINTMENT (OUTPATIENT)
Age: 79
End: 2024-06-07

## 2024-06-20 ENCOUNTER — APPOINTMENT (OUTPATIENT)
Dept: NEUROLOGY | Facility: CLINIC | Age: 79
End: 2024-06-20

## 2024-08-19 ENCOUNTER — APPOINTMENT (OUTPATIENT)
Dept: ELECTROPHYSIOLOGY | Facility: CLINIC | Age: 79
End: 2024-08-19

## 2024-08-19 VITALS
SYSTOLIC BLOOD PRESSURE: 112 MMHG | RESPIRATION RATE: 17 BRPM | DIASTOLIC BLOOD PRESSURE: 56 MMHG | HEART RATE: 64 BPM | TEMPERATURE: 97.6 F | HEIGHT: 65 IN

## 2024-08-19 DIAGNOSIS — Z45.018 ENCOUNTER FOR ADJUSTMENT AND MANAGEMENT OF OTHER PART OF CARDIAC PACEMAKER: ICD-10-CM

## 2024-08-19 DIAGNOSIS — I48.91 UNSPECIFIED ATRIAL FIBRILLATION: ICD-10-CM

## 2024-08-19 DIAGNOSIS — I44.2 ATRIOVENTRICULAR BLOCK, COMPLETE: ICD-10-CM

## 2024-08-19 PROCEDURE — 99213 OFFICE O/P EST LOW 20 MIN: CPT

## 2024-08-19 PROCEDURE — 93280 PM DEVICE PROGR EVAL DUAL: CPT

## 2024-08-19 PROCEDURE — 93000 ELECTROCARDIOGRAM COMPLETE: CPT | Mod: 59

## 2024-08-19 RX ORDER — FUROSEMIDE 20 MG/1
20 TABLET ORAL
Refills: 0 | Status: ACTIVE | COMMUNITY

## 2024-08-19 NOTE — PHYSICAL EXAM
[General Appearance - Well Developed] : well developed [Normal Appearance] : normal appearance [Well Groomed] : well groomed [General Appearance - Well Nourished] : well nourished [No Deformities] : no deformities [General Appearance - In No Acute Distress] : no acute distress [Heart Rate And Rhythm] : heart rate and rhythm were normal [Heart Sounds] : normal S1 and S2 [Murmurs] : no murmurs present [Respiration, Rhythm And Depth] : normal respiratory rhythm and effort [Exaggerated Use Of Accessory Muscles For Inspiration] : no accessory muscle use [Left Infraclavicular] : left infraclavicular area [Auscultation Breath Sounds / Voice Sounds] : lungs were clear to auscultation bilaterally [Clean] : clean [Dry] : dry [Well-Healed] : well-healed [Abdomen Soft] : soft [Abdomen Tenderness] : non-tender [Abdomen Mass (___ Cm)] : no abdominal mass palpated [Nail Clubbing] : no clubbing of the fingernails [Cyanosis, Localized] : no localized cyanosis [Petechial Hemorrhages (___cm)] : no petechial hemorrhages [] : no ischemic changes [Normal Conjunctiva] : the conjunctiva exhibited no abnormalities [Eyelids - No Xanthelasma] : the eyelids demonstrated no xanthelasmas [Normal Oral Mucosa] : normal oral mucosa [No Oral Pallor] : no oral pallor [No Oral Cyanosis] : no oral cyanosis [Normal Jugular Venous A Waves Present] : normal jugular venous A waves present [Normal Jugular Venous V Waves Present] : normal jugular venous V waves present [No Jugular Venous White A Waves] : no jugular venous white A waves [Abnormal Walk] : normal gait [Gait - Sufficient For Exercise Testing] : the gait was sufficient for exercise testing

## 2024-08-27 NOTE — PROCEDURE
[No] : not [NSR] : normal sinus rhythm [Pacemaker] : pacemaker [DDDR] : DDDR [Longevity: ___ months] : The estimated remaining battery life is [unfilled] months [Normal] : The battery status is normal. [Threshold Testing Performed] : Threshold testing was performed [Lead Imp:  ___ohms] : lead impedance was [unfilled] ohms [Sensing Amplitude ___mv] : sensing amplitude was [unfilled] mv [___V @] : [unfilled] V [___ ms] : [unfilled] ms [None] : none [de-identified] : [place] [facility] [de-identified] : Accortizde [de-identified] : [id number] [de-identified] : 1/29/18 [de-identified] : 60 [de-identified] :  68 % AP 84%

## 2024-08-27 NOTE — ASSESSMENT
[FreeTextEntry1] : Dual chamber PPM for CHB Interrogation showed stable parameters  PAF -  on 1 year data showed 0 recurrence Revisit long term use of flecanide.  2023 echo - LVEF is normal as well nuclear stress test/MPI showed no infarct/ischemia PLAN continue flecanide cont xarelto 20mg daily - denies any issue of bleeding cont metoprolol tartrate 50mg 3x daily.  BP is well controlled She is enrolled in remote monitoring every 90 days  RTO in 9 months and prn

## 2024-08-27 NOTE — CARDIOLOGY SUMMARY
[de-identified] : ECG ( 8/19/2024 - A fib , V paced with occ PVC ECG 60 bpm V paced in sinus EKG (02/21/2023): Sinus rhythm at 64 bpm with first degree AV block.   [de-identified] : 8/2/2023 - LVEF 50-55% severe TR JAXSON (5/15/2018)- EF 55-60%, mod AR, mild MR , mild - mod TR  [de-identified] : CTA 8/18 - normal [de-identified] : Nuclear 6/17/17 - normal EF 60% 7/21/2023 - MPI sestamibi and adenosine- normal, no infarct or ischemia [de-identified] : 1/29/2018 - [place] [facility]  Duall chamber PPM

## 2024-08-27 NOTE — PROCEDURE
[No] : not [NSR] : normal sinus rhythm [Pacemaker] : pacemaker [DDDR] : DDDR [Longevity: ___ months] : The estimated remaining battery life is [unfilled] months [Normal] : The battery status is normal. [Threshold Testing Performed] : Threshold testing was performed [Lead Imp:  ___ohms] : lead impedance was [unfilled] ohms [Sensing Amplitude ___mv] : sensing amplitude was [unfilled] mv [___V @] : [unfilled] V [___ ms] : [unfilled] ms [None] : none [de-identified] : [place] [facility] [de-identified] : Accortizde [de-identified] : [id number] [de-identified] : 1/29/18 [de-identified] : 60 [de-identified] :  68 % AP 84%

## 2024-08-27 NOTE — HISTORY OF PRESENT ILLNESS
[Palpitations] : no palpitations [SOB] : no dyspnea [Erythema at Site] : no erythema at device site [Swelling at Site] : no swelling at device site [de-identified] : \par  \par  \par    [FreeTextEntry1] : 79 y/o with pmh of  MVR MAZE, AF/AFL s/p AF ablation x2, recurrent Afib/ Fl , rhythm controlled with  Flecainide, CHB , she underwent a Dual Chamber PPM implantation on 1/29/2018 TIA 2002    PCP: Dr. Cota ( [phone] Cardiologist:  Dr. Christian  08/16/22: Feels fine.  Denies chest pain, shortness of breath, palpitation, dizziness or LOC except noted above.  EKG (08/16/22): AP@62

## 2024-08-27 NOTE — CARDIOLOGY SUMMARY
[de-identified] : ECG ( 8/19/2024 - A fib , V paced with occ PVC ECG 60 bpm V paced in sinus EKG (02/21/2023): Sinus rhythm at 64 bpm with first degree AV block.   [de-identified] : 8/2/2023 - LVEF 50-55% severe TR JAXSON (5/15/2018)- EF 55-60%, mod AR, mild MR , mild - mod TR  [de-identified] : CTA 8/18 - normal [de-identified] : Nuclear 6/17/17 - normal EF 60% 7/21/2023 - MPI sestamibi and adenosine- normal, no infarct or ischemia [de-identified] : 1/29/2018 - [facility]  Duall chamber PPM

## 2024-08-27 NOTE — HISTORY OF PRESENT ILLNESS
[Palpitations] : no palpitations [SOB] : no dyspnea [Erythema at Site] : no erythema at device site [Swelling at Site] : no swelling at device site [de-identified] : \par  \par  \par    [FreeTextEntry1] : 77 y/o with pmh of  MVR MAZE, AF/AFL s/p AF ablation x2, recurrent Afib/ Fl , rhythm controlled with  Flecainide, CHB , she underwent a Dual Chamber PPM implantation on 1/29/2018 TIA 2002    PCP: Dr. Cota ( [phone] Cardiologist:  Dr. Christian  08/16/22: Feels fine.  Denies chest pain, shortness of breath, palpitation, dizziness or LOC except noted above.  EKG (08/16/22): AP@62

## 2024-09-17 ENCOUNTER — APPOINTMENT (OUTPATIENT)
Dept: ELECTROPHYSIOLOGY | Facility: CLINIC | Age: 79
End: 2024-09-17

## 2024-09-17 VITALS
TEMPERATURE: 98 F | WEIGHT: 135 LBS | HEIGHT: 65 IN | SYSTOLIC BLOOD PRESSURE: 120 MMHG | HEART RATE: 60 BPM | BODY MASS INDEX: 22.49 KG/M2 | DIASTOLIC BLOOD PRESSURE: 70 MMHG

## 2024-09-17 DIAGNOSIS — I44.2 ATRIOVENTRICULAR BLOCK, COMPLETE: ICD-10-CM

## 2024-09-17 DIAGNOSIS — I48.91 UNSPECIFIED ATRIAL FIBRILLATION: ICD-10-CM

## 2024-09-17 PROCEDURE — 93280 PM DEVICE PROGR EVAL DUAL: CPT

## 2024-09-17 PROCEDURE — 99214 OFFICE O/P EST MOD 30 MIN: CPT

## 2024-09-17 NOTE — PHYSICAL EXAM
[General Appearance - Well Developed] : well developed [Normal Appearance] : normal appearance [Well Groomed] : well groomed [General Appearance - Well Nourished] : well nourished [No Deformities] : no deformities [General Appearance - In No Acute Distress] : no acute distress [Heart Rate And Rhythm] : heart rate and rhythm were normal [Murmurs] : no murmurs present [Heart Sounds] : normal S1 and S2 [Respiration, Rhythm And Depth] : normal respiratory rhythm and effort [Exaggerated Use Of Accessory Muscles For Inspiration] : no accessory muscle use [Auscultation Breath Sounds / Voice Sounds] : lungs were clear to auscultation bilaterally [Left Infraclavicular] : left infraclavicular area [Clean] : clean [Dry] : dry [Abdomen Soft] : soft [Abdomen Tenderness] : non-tender [Abdomen Mass (___ Cm)] : no abdominal mass palpated [Nail Clubbing] : no clubbing of the fingernails [Cyanosis, Localized] : no localized cyanosis [Petechial Hemorrhages (___cm)] : no petechial hemorrhages [] : no ischemic changes [Normal Conjunctiva] : the conjunctiva exhibited no abnormalities [Eyelids - No Xanthelasma] : the eyelids demonstrated no xanthelasmas [Normal Oral Mucosa] : normal oral mucosa [No Oral Pallor] : no oral pallor [No Oral Cyanosis] : no oral cyanosis [Normal Jugular Venous A Waves Present] : normal jugular venous A waves present [Normal Jugular Venous V Waves Present] : normal jugular venous V waves present [No Jugular Venous White A Waves] : no jugular venous white A waves [Erythema] : not erythematous

## 2024-09-17 NOTE — ADDENDUM
[FreeTextEntry1] : Areli TOWNSEND assisted in documentation on 09/17/2024   acting as a scribe for Dr. Johnson Hinton.

## 2024-09-17 NOTE — ASSESSMENT
[FreeTextEntry1] : Patient with history of MVR MAZE, AF/AFL s/p AF ablation x2. Patient is doing great on flecanide and meteopolol  AF  - Patient had no recent episodes of atrial fibrillation and is doing well.  - Continue Xarelto 20 mg once a day, no bleeding.  - Continue Flecainide 50 mg twice a day.  - Continue Metoprolol 50 mg twice a day.  - Stress test to evaluate for any coronary disease given the fact the patient is taking 1C drugs.  - Patient is doing well, no atrial fibrillation.   - Stress test last year was negative. Recommend repeating stress test very soon.    Sick Sinus Syndrome/Complete Heart Block  - Normal dual chamber pacemaker function.

## 2024-09-17 NOTE — PROCEDURE
[No] : not [Sinus Bradycardia] : sinus bradycardia [Pacemaker] : pacemaker [DDDR] : DDDR [Longevity: ___ months] : The estimated remaining battery life is [unfilled] months [Threshold Testing Performed] : Threshold testing was performed [Lead Imp:  ___ohms] : lead impedance was [unfilled] ohms [Sensing Amplitude ___mv] : sensing amplitude was [unfilled] mv [___V @] : [unfilled] V [___ ms] : [unfilled] ms [None] : none [Counters Reset] : the counters were reset [de-identified] : [facility]  [de-identified] : L311 [de-identified] : [id number] [de-identified] : 01/29/2018 [de-identified] : 59 [de-identified] : 8 Months to JASEN [de-identified] : APace: 76%, VPace: 81% No Events  8 Months to JASEN

## 2024-09-17 NOTE — HISTORY OF PRESENT ILLNESS
[None] : The patient complains of no symptoms [Palpitations] : no palpitations [SOB] : no dyspnea [Erythema at Site] : no erythema at device site [Swelling at Site] : no swelling at device site [de-identified] : Cardiologist: Dr. Taylor  Patient with history of HTN,  MVR MAZE, AF/AFL s/p AF ablation x2. Patient is doing great on flecanide . Had a dual chamber PPM implanted for symptomatic   intermittent  CHB on  1/29/2018. TIA 2002 CHADVSC 6   Patient was recently hospitalized for a TIA which is new. Patient comes for a routine follow-up today. On pacemaker evaluation, it showed no AF since last interrogation.  09/17/2024: Patient comes to the office today for routine follow-up. Patient is doing great. No atrial fibrillation episodes since last interrogation. Patient's stress test was also negative last year.    Nuclear stress test (07/2023): Normal perfusion, normal EF.  EKG (02/21/2023): Sinus rhythm at 64 bpm with first degree AV block.   Echocardiogram (07/2022): EF of 55%, no major valvular disease, moderate TR.   CTA 8/18 - normal labs reviewed Nuclear 6/17/17 - normal EF 60%

## 2024-09-17 NOTE — END OF VISIT
[FreeTextEntry3] :  All medical record entries made by my scribe were at my, Dr. Johnson Hinton, direction and personally dictated by me. I have reviewed the chart and agree that the record accurately reflects my personal performance of the history, physical exam, assessment and plan. I have also personally directed, reviewed, and agreed with the chart.

## 2024-10-01 ENCOUNTER — INPATIENT (INPATIENT)
Facility: HOSPITAL | Age: 79
LOS: 3 days | Discharge: HOME | DRG: 379 | End: 2024-10-05
Attending: INTERNAL MEDICINE | Admitting: STUDENT IN AN ORGANIZED HEALTH CARE EDUCATION/TRAINING PROGRAM
Payer: MEDICARE

## 2024-10-01 VITALS
TEMPERATURE: 99 F | OXYGEN SATURATION: 98 % | HEART RATE: 60 BPM | SYSTOLIC BLOOD PRESSURE: 164 MMHG | WEIGHT: 134.92 LBS | HEIGHT: 64 IN | RESPIRATION RATE: 18 BRPM | DIASTOLIC BLOOD PRESSURE: 85 MMHG

## 2024-10-01 DIAGNOSIS — K92.2 GASTROINTESTINAL HEMORRHAGE, UNSPECIFIED: ICD-10-CM

## 2024-10-01 DIAGNOSIS — Z95.0 PRESENCE OF CARDIAC PACEMAKER: Chronic | ICD-10-CM

## 2024-10-01 DIAGNOSIS — Z98.890 OTHER SPECIFIED POSTPROCEDURAL STATES: Chronic | ICD-10-CM

## 2024-10-01 LAB
ALBUMIN SERPL ELPH-MCNC: 3.9 G/DL — SIGNIFICANT CHANGE UP (ref 3.5–5.2)
ALP SERPL-CCNC: 70 U/L — SIGNIFICANT CHANGE UP (ref 30–115)
ALT FLD-CCNC: 17 U/L — SIGNIFICANT CHANGE UP (ref 0–41)
ANION GAP SERPL CALC-SCNC: 15 MMOL/L — HIGH (ref 7–14)
APTT BLD: 29 SEC — SIGNIFICANT CHANGE UP (ref 27–39.2)
AST SERPL-CCNC: 25 U/L — SIGNIFICANT CHANGE UP (ref 0–41)
BASE EXCESS BLDV CALC-SCNC: 0.9 MMOL/L — SIGNIFICANT CHANGE UP (ref -2–3)
BASOPHILS # BLD AUTO: 0.04 K/UL — SIGNIFICANT CHANGE UP (ref 0–0.2)
BASOPHILS NFR BLD AUTO: 0.5 % — SIGNIFICANT CHANGE UP (ref 0–1)
BILIRUB SERPL-MCNC: 0.5 MG/DL — SIGNIFICANT CHANGE UP (ref 0.2–1.2)
BLD GP AB SCN SERPL QL: SIGNIFICANT CHANGE UP
BUN SERPL-MCNC: 29 MG/DL — HIGH (ref 10–20)
CA-I SERPL-SCNC: 1.18 MMOL/L — SIGNIFICANT CHANGE UP (ref 1.15–1.33)
CALCIUM SERPL-MCNC: 8.8 MG/DL — SIGNIFICANT CHANGE UP (ref 8.4–10.4)
CHLORIDE SERPL-SCNC: 88 MMOL/L — LOW (ref 98–110)
CO2 SERPL-SCNC: 23 MMOL/L — SIGNIFICANT CHANGE UP (ref 17–32)
CREAT SERPL-MCNC: 1.1 MG/DL — SIGNIFICANT CHANGE UP (ref 0.7–1.5)
EGFR: 51 ML/MIN/1.73M2 — LOW
EGFR: 51 ML/MIN/1.73M2 — LOW
EOSINOPHIL # BLD AUTO: 0.06 K/UL — SIGNIFICANT CHANGE UP (ref 0–0.7)
EOSINOPHIL NFR BLD AUTO: 0.7 % — SIGNIFICANT CHANGE UP (ref 0–8)
GAS PNL BLDV: 118 MMOL/L — CRITICAL LOW (ref 136–145)
GAS PNL BLDV: SIGNIFICANT CHANGE UP
GLUCOSE SERPL-MCNC: 110 MG/DL — HIGH (ref 70–99)
HCO3 BLDV-SCNC: 25 MMOL/L — SIGNIFICANT CHANGE UP (ref 22–29)
HCT VFR BLD CALC: 21.7 % — LOW (ref 37–47)
HCT VFR BLDA CALC: 26 % — LOW (ref 34.5–46.5)
HGB BLD CALC-MCNC: 8.5 G/DL — LOW (ref 11.7–16.1)
HGB BLD-MCNC: 7.1 G/DL — LOW (ref 12–16)
IMM GRANULOCYTES NFR BLD AUTO: 0.5 % — HIGH (ref 0.1–0.3)
INR BLD: 1.13 RATIO — SIGNIFICANT CHANGE UP (ref 0.65–1.3)
LACTATE BLDV-MCNC: 1.2 MMOL/L — SIGNIFICANT CHANGE UP (ref 0.5–2)
LACTATE SERPL-SCNC: 1.6 MMOL/L — SIGNIFICANT CHANGE UP (ref 0.7–2)
LIDOCAIN IGE QN: 44 U/L — SIGNIFICANT CHANGE UP (ref 7–60)
LYMPHOCYTES # BLD AUTO: 1.93 K/UL — SIGNIFICANT CHANGE UP (ref 1.2–3.4)
LYMPHOCYTES # BLD AUTO: 22.7 % — SIGNIFICANT CHANGE UP (ref 20.5–51.1)
MAGNESIUM SERPL-MCNC: 1.8 MG/DL — SIGNIFICANT CHANGE UP (ref 1.8–2.4)
MCHC RBC-ENTMCNC: 27.7 PG — SIGNIFICANT CHANGE UP (ref 27–31)
MCHC RBC-ENTMCNC: 32.7 G/DL — SIGNIFICANT CHANGE UP (ref 32–37)
MCV RBC AUTO: 84.8 FL — SIGNIFICANT CHANGE UP (ref 81–99)
MONOCYTES # BLD AUTO: 0.63 K/UL — HIGH (ref 0.1–0.6)
MONOCYTES NFR BLD AUTO: 7.4 % — SIGNIFICANT CHANGE UP (ref 1.7–9.3)
NEUTROPHILS # BLD AUTO: 5.82 K/UL — SIGNIFICANT CHANGE UP (ref 1.4–6.5)
NEUTROPHILS NFR BLD AUTO: 68.2 % — SIGNIFICANT CHANGE UP (ref 42.2–75.2)
NRBC # BLD: 0 /100 WBCS — SIGNIFICANT CHANGE UP (ref 0–0)
NRBC BLD-RTO: 0 /100 WBCS — SIGNIFICANT CHANGE UP (ref 0–0)
PCO2 BLDV: 38 MMHG — LOW (ref 39–42)
PH BLDV: 7.43 — SIGNIFICANT CHANGE UP (ref 7.32–7.43)
PLATELET # BLD AUTO: 180 K/UL — SIGNIFICANT CHANGE UP (ref 130–400)
PMV BLD: 11.3 FL — HIGH (ref 7.4–10.4)
PO2 BLDV: 21 MMHG — LOW (ref 25–45)
POTASSIUM BLDV-SCNC: 4.3 MMOL/L — SIGNIFICANT CHANGE UP (ref 3.5–5.1)
POTASSIUM SERPL-MCNC: 4.7 MMOL/L — SIGNIFICANT CHANGE UP (ref 3.5–5)
POTASSIUM SERPL-SCNC: 4.7 MMOL/L — SIGNIFICANT CHANGE UP (ref 3.5–5)
PROT SERPL-MCNC: 5.8 G/DL — LOW (ref 6–8)
PROTHROM AB SERPL-ACNC: 12.9 SEC — HIGH (ref 9.95–12.87)
RBC # BLD: 2.56 M/UL — LOW (ref 4.2–5.4)
RBC # FLD: 16.8 % — HIGH (ref 11.5–14.5)
SAO2 % BLDV: 28.6 % — LOW (ref 67–88)
SODIUM SERPL-SCNC: 126 MMOL/L — LOW (ref 135–146)
WBC # BLD: 8.52 K/UL — SIGNIFICANT CHANGE UP (ref 4.8–10.8)
WBC # FLD AUTO: 8.52 K/UL — SIGNIFICANT CHANGE UP (ref 4.8–10.8)

## 2024-10-01 PROCEDURE — 71045 X-RAY EXAM CHEST 1 VIEW: CPT | Mod: 26

## 2024-10-01 PROCEDURE — 36415 COLL VENOUS BLD VENIPUNCTURE: CPT

## 2024-10-01 PROCEDURE — 84100 ASSAY OF PHOSPHORUS: CPT

## 2024-10-01 PROCEDURE — C1889: CPT

## 2024-10-01 PROCEDURE — 83735 ASSAY OF MAGNESIUM: CPT

## 2024-10-01 PROCEDURE — 74174 CTA ABD&PLVS W/CONTRAST: CPT | Mod: 26,MC

## 2024-10-01 PROCEDURE — 99291 CRITICAL CARE FIRST HOUR: CPT

## 2024-10-01 PROCEDURE — 86850 RBC ANTIBODY SCREEN: CPT

## 2024-10-01 PROCEDURE — 80053 COMPREHEN METABOLIC PANEL: CPT

## 2024-10-01 PROCEDURE — 80048 BASIC METABOLIC PNL TOTAL CA: CPT

## 2024-10-01 PROCEDURE — 85027 COMPLETE CBC AUTOMATED: CPT

## 2024-10-01 PROCEDURE — 84484 ASSAY OF TROPONIN QUANT: CPT

## 2024-10-01 PROCEDURE — 83540 ASSAY OF IRON: CPT

## 2024-10-01 PROCEDURE — 83550 IRON BINDING TEST: CPT

## 2024-10-01 PROCEDURE — 86900 BLOOD TYPING SEROLOGIC ABO: CPT

## 2024-10-01 PROCEDURE — 86901 BLOOD TYPING SEROLOGIC RH(D): CPT

## 2024-10-01 PROCEDURE — 82728 ASSAY OF FERRITIN: CPT

## 2024-10-01 PROCEDURE — 93005 ELECTROCARDIOGRAM TRACING: CPT

## 2024-10-01 PROCEDURE — 85025 COMPLETE CBC W/AUTO DIFF WBC: CPT

## 2024-10-01 PROCEDURE — 83605 ASSAY OF LACTIC ACID: CPT

## 2024-10-01 PROCEDURE — 88305 TISSUE EXAM BY PATHOLOGIST: CPT

## 2024-10-01 PROCEDURE — 94640 AIRWAY INHALATION TREATMENT: CPT

## 2024-10-01 PROCEDURE — 93280 PM DEVICE PROGR EVAL DUAL: CPT

## 2024-10-01 RX ORDER — POLYETHYLENE GLYCOL-3350 AND ELECTROLYTES 236; 6.74; 5.86; 2.97; 22.74 G/274.31G; G/274.31G; G/274.31G; G/274.31G; G/274.31G
4000 POWDER, FOR SOLUTION ORAL ONCE
Refills: 0 | Status: COMPLETED | OUTPATIENT
Start: 2024-10-01 | End: 2024-10-01

## 2024-10-01 RX ORDER — ATORVASTATIN CALCIUM 80 MG/1
10 TABLET, FILM COATED ORAL AT BEDTIME
Refills: 0 | Status: DISCONTINUED | OUTPATIENT
Start: 2024-10-01 | End: 2024-10-05

## 2024-10-01 RX ORDER — SODIUM CHLORIDE 9 G/1000ML
1000 INJECTION, SOLUTION INTRAVENOUS
Refills: 0 | Status: DISCONTINUED | OUTPATIENT
Start: 2024-10-01 | End: 2024-10-03

## 2024-10-01 RX ORDER — ALBUTEROL SULFATE 2.5 MG/3ML
2 VIAL, NEBULIZER (ML) INHALATION EVERY 6 HOURS
Refills: 0 | Status: DISCONTINUED | OUTPATIENT
Start: 2024-10-01 | End: 2024-10-05

## 2024-10-01 RX ORDER — FLECAINIDE ACETATE 50 MG
50 TABLET ORAL EVERY 12 HOURS
Refills: 0 | Status: DISCONTINUED | OUTPATIENT
Start: 2024-10-01 | End: 2024-10-02

## 2024-10-01 RX ADMIN — Medication 1000 MILLILITER(S): at 16:56

## 2024-10-01 RX ADMIN — POLYETHYLENE GLYCOL-3350 AND ELECTROLYTES 4000 MILLILITER(S): 236; 6.74; 5.86; 2.97; 22.74 POWDER, FOR SOLUTION ORAL at 20:48

## 2024-10-01 RX ADMIN — Medication 40 MILLIGRAM(S): at 16:35

## 2024-10-01 NOTE — ED PROVIDER NOTE - NSCAREINITIATED _GEN_ER
Patient wants [place] rx sent to [facility] [place] as [facility] is out of medication and having a hard time getting it.  RN spoke with [facility] pharmacist and asked them to cancel the rx that had been sent to them.  Patient wants a call once rx sent as she is out of med.  Call her at [phone].  YESICA Goldberg R.N.     Riley Nagel)

## 2024-10-02 ENCOUNTER — TRANSCRIPTION ENCOUNTER (OUTPATIENT)
Age: 79
End: 2024-10-02

## 2024-10-02 ENCOUNTER — RESULT REVIEW (OUTPATIENT)
Age: 79
End: 2024-10-02

## 2024-10-02 LAB
ALBUMIN SERPL ELPH-MCNC: 3.7 G/DL — SIGNIFICANT CHANGE UP (ref 3.5–5.2)
ALP SERPL-CCNC: 73 U/L — SIGNIFICANT CHANGE UP (ref 30–115)
ALT FLD-CCNC: 25 U/L — SIGNIFICANT CHANGE UP (ref 0–41)
ANION GAP SERPL CALC-SCNC: 12 MMOL/L — SIGNIFICANT CHANGE UP (ref 7–14)
AST SERPL-CCNC: 31 U/L — SIGNIFICANT CHANGE UP (ref 0–41)
BASOPHILS # BLD AUTO: 0.03 K/UL — SIGNIFICANT CHANGE UP (ref 0–0.2)
BASOPHILS NFR BLD AUTO: 0.5 % — SIGNIFICANT CHANGE UP (ref 0–1)
BILIRUB SERPL-MCNC: 1 MG/DL — SIGNIFICANT CHANGE UP (ref 0.2–1.2)
BUN SERPL-MCNC: 17 MG/DL — SIGNIFICANT CHANGE UP (ref 10–20)
CALCIUM SERPL-MCNC: 8.9 MG/DL — SIGNIFICANT CHANGE UP (ref 8.4–10.5)
CHLORIDE SERPL-SCNC: 96 MMOL/L — LOW (ref 98–110)
CO2 SERPL-SCNC: 21 MMOL/L — SIGNIFICANT CHANGE UP (ref 17–32)
CREAT SERPL-MCNC: 0.8 MG/DL — SIGNIFICANT CHANGE UP (ref 0.7–1.5)
EGFR: 75 ML/MIN/1.73M2 — SIGNIFICANT CHANGE UP
EGFR: 75 ML/MIN/1.73M2 — SIGNIFICANT CHANGE UP
EOSINOPHIL # BLD AUTO: 0.06 K/UL — SIGNIFICANT CHANGE UP (ref 0–0.7)
EOSINOPHIL NFR BLD AUTO: 0.9 % — SIGNIFICANT CHANGE UP (ref 0–8)
GLUCOSE SERPL-MCNC: 91 MG/DL — SIGNIFICANT CHANGE UP (ref 70–99)
HCT VFR BLD CALC: 24.3 % — LOW (ref 37–47)
HGB BLD-MCNC: 7.9 G/DL — LOW (ref 12–16)
IMM GRANULOCYTES NFR BLD AUTO: 0.5 % — HIGH (ref 0.1–0.3)
IRON SATN MFR SERPL: 27 UG/DL — LOW (ref 35–150)
IRON SATN MFR SERPL: 7 % — LOW (ref 15–50)
LACTATE SERPL-SCNC: 1.4 MMOL/L — SIGNIFICANT CHANGE UP (ref 0.7–2)
LYMPHOCYTES # BLD AUTO: 1.62 K/UL — SIGNIFICANT CHANGE UP (ref 1.2–3.4)
LYMPHOCYTES # BLD AUTO: 25.4 % — SIGNIFICANT CHANGE UP (ref 20.5–51.1)
MAGNESIUM SERPL-MCNC: 1.7 MG/DL — LOW (ref 1.8–2.4)
MCHC RBC-ENTMCNC: 27.4 PG — SIGNIFICANT CHANGE UP (ref 27–31)
MCHC RBC-ENTMCNC: 32.5 G/DL — SIGNIFICANT CHANGE UP (ref 32–37)
MCV RBC AUTO: 84.4 FL — SIGNIFICANT CHANGE UP (ref 81–99)
MONOCYTES # BLD AUTO: 0.62 K/UL — HIGH (ref 0.1–0.6)
MONOCYTES NFR BLD AUTO: 9.7 % — HIGH (ref 1.7–9.3)
NEUTROPHILS # BLD AUTO: 4.01 K/UL — SIGNIFICANT CHANGE UP (ref 1.4–6.5)
NEUTROPHILS NFR BLD AUTO: 63 % — SIGNIFICANT CHANGE UP (ref 42.2–75.2)
NRBC # BLD: 0 /100 WBCS — SIGNIFICANT CHANGE UP (ref 0–0)
NRBC BLD-RTO: 0 /100 WBCS — SIGNIFICANT CHANGE UP (ref 0–0)
PLATELET # BLD AUTO: 155 K/UL — SIGNIFICANT CHANGE UP (ref 130–400)
PMV BLD: 11.6 FL — HIGH (ref 7.4–10.4)
POTASSIUM SERPL-MCNC: 3.9 MMOL/L — SIGNIFICANT CHANGE UP (ref 3.5–5)
POTASSIUM SERPL-SCNC: 3.9 MMOL/L — SIGNIFICANT CHANGE UP (ref 3.5–5)
PROT SERPL-MCNC: 5.5 G/DL — LOW (ref 6–8)
RBC # BLD: 2.88 M/UL — LOW (ref 4.2–5.4)
RBC # FLD: 16.8 % — HIGH (ref 11.5–14.5)
SODIUM SERPL-SCNC: 129 MMOL/L — LOW (ref 135–146)
TIBC SERPL-MCNC: 398 UG/DL — SIGNIFICANT CHANGE UP (ref 220–430)
UIBC SERPL-MCNC: 371 UG/DL — HIGH (ref 110–370)
WBC # BLD: 6.37 K/UL — SIGNIFICANT CHANGE UP (ref 4.8–10.8)
WBC # FLD AUTO: 6.37 K/UL — SIGNIFICANT CHANGE UP (ref 4.8–10.8)

## 2024-10-02 PROCEDURE — 88305 TISSUE EXAM BY PATHOLOGIST: CPT | Mod: 26

## 2024-10-02 PROCEDURE — 45380 COLONOSCOPY AND BIOPSY: CPT | Mod: XU

## 2024-10-02 PROCEDURE — 99222 1ST HOSP IP/OBS MODERATE 55: CPT

## 2024-10-02 PROCEDURE — 99223 1ST HOSP IP/OBS HIGH 75: CPT

## 2024-10-02 PROCEDURE — 99223 1ST HOSP IP/OBS HIGH 75: CPT | Mod: 25

## 2024-10-02 PROCEDURE — 45385 COLONOSCOPY W/LESION REMOVAL: CPT

## 2024-10-02 PROCEDURE — 45381 COLONOSCOPY SUBMUCOUS NJX: CPT | Mod: XU

## 2024-10-02 RX ORDER — FERROUS SULFATE 137(45) MG
325 TABLET, EXTENDED RELEASE ORAL DAILY
Refills: 0 | Status: DISCONTINUED | OUTPATIENT
Start: 2024-10-02 | End: 2024-10-05

## 2024-10-02 RX ORDER — RIVAROXABAN 10 MG/1
20 TABLET, FILM COATED ORAL DAILY
Refills: 0 | Status: DISCONTINUED | OUTPATIENT
Start: 2024-10-02 | End: 2024-10-03

## 2024-10-02 RX ORDER — METOPROLOL SUCCINATE 50 MG/1
50 TABLET, EXTENDED RELEASE ORAL
Refills: 0 | Status: DISCONTINUED | OUTPATIENT
Start: 2024-10-02 | End: 2024-10-05

## 2024-10-02 RX ORDER — FLECAINIDE ACETATE 50 MG
50 TABLET ORAL EVERY 12 HOURS
Refills: 0 | Status: DISCONTINUED | OUTPATIENT
Start: 2024-10-02 | End: 2024-10-05

## 2024-10-02 RX ORDER — MAGNESIUM SULFATE 500 MG/ML
2 SYRINGE (ML) INJECTION ONCE
Refills: 0 | Status: COMPLETED | OUTPATIENT
Start: 2024-10-02 | End: 2024-10-02

## 2024-10-02 RX ADMIN — Medication 1 DOSE(S): at 07:45

## 2024-10-02 RX ADMIN — Medication 2000 UNIT(S): at 12:07

## 2024-10-02 RX ADMIN — Medication 25 GRAM(S): at 09:57

## 2024-10-02 RX ADMIN — Medication 50 MILLIGRAM(S): at 05:00

## 2024-10-02 RX ADMIN — Medication 40 MILLIGRAM(S): at 05:16

## 2024-10-02 RX ADMIN — SODIUM CHLORIDE 75 MILLILITER(S): 9 INJECTION, SOLUTION INTRAVENOUS at 01:56

## 2024-10-02 RX ADMIN — ATORVASTATIN CALCIUM 10 MILLIGRAM(S): 80 TABLET, FILM COATED ORAL at 21:45

## 2024-10-03 LAB
ALBUMIN SERPL ELPH-MCNC: 3.7 G/DL — SIGNIFICANT CHANGE UP (ref 3.5–5.2)
ALP SERPL-CCNC: 69 U/L — SIGNIFICANT CHANGE UP (ref 30–115)
ALT FLD-CCNC: 23 U/L — SIGNIFICANT CHANGE UP (ref 0–41)
ANION GAP SERPL CALC-SCNC: 10 MMOL/L — SIGNIFICANT CHANGE UP (ref 7–14)
AST SERPL-CCNC: 28 U/L — SIGNIFICANT CHANGE UP (ref 0–41)
BILIRUB SERPL-MCNC: 0.6 MG/DL — SIGNIFICANT CHANGE UP (ref 0.2–1.2)
BUN SERPL-MCNC: 11 MG/DL — SIGNIFICANT CHANGE UP (ref 10–20)
CALCIUM SERPL-MCNC: 8.7 MG/DL — SIGNIFICANT CHANGE UP (ref 8.4–10.5)
CHLORIDE SERPL-SCNC: 103 MMOL/L — SIGNIFICANT CHANGE UP (ref 98–110)
CO2 SERPL-SCNC: 24 MMOL/L — SIGNIFICANT CHANGE UP (ref 17–32)
CREAT SERPL-MCNC: 1 MG/DL — SIGNIFICANT CHANGE UP (ref 0.7–1.5)
EGFR: 57 ML/MIN/1.73M2 — LOW
EGFR: 57 ML/MIN/1.73M2 — LOW
FERRITIN SERPL-MCNC: 16 NG/ML — SIGNIFICANT CHANGE UP (ref 13–330)
GLUCOSE SERPL-MCNC: 87 MG/DL — SIGNIFICANT CHANGE UP (ref 70–99)
HCT VFR BLD CALC: 23.2 % — LOW (ref 37–47)
HCT VFR BLD CALC: 23.6 % — LOW (ref 37–47)
HGB BLD-MCNC: 7.5 G/DL — LOW (ref 12–16)
HGB BLD-MCNC: 7.6 G/DL — LOW (ref 12–16)
MCHC RBC-ENTMCNC: 27.2 PG — SIGNIFICANT CHANGE UP (ref 27–31)
MCHC RBC-ENTMCNC: 27.8 PG — SIGNIFICANT CHANGE UP (ref 27–31)
MCHC RBC-ENTMCNC: 32.2 G/DL — SIGNIFICANT CHANGE UP (ref 32–37)
MCHC RBC-ENTMCNC: 32.3 G/DL — SIGNIFICANT CHANGE UP (ref 32–37)
MCV RBC AUTO: 84.6 FL — SIGNIFICANT CHANGE UP (ref 81–99)
MCV RBC AUTO: 85.9 FL — SIGNIFICANT CHANGE UP (ref 81–99)
NRBC # BLD: 0 /100 WBCS — SIGNIFICANT CHANGE UP (ref 0–0)
NRBC # BLD: 0 /100 WBCS — SIGNIFICANT CHANGE UP (ref 0–0)
NRBC BLD-RTO: 0 /100 WBCS — SIGNIFICANT CHANGE UP (ref 0–0)
NRBC BLD-RTO: 0 /100 WBCS — SIGNIFICANT CHANGE UP (ref 0–0)
PLATELET # BLD AUTO: 150 K/UL — SIGNIFICANT CHANGE UP (ref 130–400)
PLATELET # BLD AUTO: 158 K/UL — SIGNIFICANT CHANGE UP (ref 130–400)
PMV BLD: 10.7 FL — HIGH (ref 7.4–10.4)
PMV BLD: 11.9 FL — HIGH (ref 7.4–10.4)
POTASSIUM SERPL-MCNC: 3.9 MMOL/L — SIGNIFICANT CHANGE UP (ref 3.5–5)
POTASSIUM SERPL-SCNC: 3.9 MMOL/L — SIGNIFICANT CHANGE UP (ref 3.5–5)
PROT SERPL-MCNC: 5.3 G/DL — LOW (ref 6–8)
RBC # BLD: 2.7 M/UL — LOW (ref 4.2–5.4)
RBC # BLD: 2.79 M/UL — LOW (ref 4.2–5.4)
RBC # FLD: 17.2 % — HIGH (ref 11.5–14.5)
RBC # FLD: 17.5 % — HIGH (ref 11.5–14.5)
SODIUM SERPL-SCNC: 137 MMOL/L — SIGNIFICANT CHANGE UP (ref 135–146)
TROPONIN T, HIGH SENSITIVITY RESULT: 24 NG/L — HIGH (ref 6–13)
TROPONIN T, HIGH SENSITIVITY RESULT: 24 NG/L — HIGH (ref 6–13)
TROPONIN T, HIGH SENSITIVITY RESULT: 25 NG/L — HIGH (ref 6–13)
WBC # BLD: 6.79 K/UL — SIGNIFICANT CHANGE UP (ref 4.8–10.8)
WBC # BLD: 8.53 K/UL — SIGNIFICANT CHANGE UP (ref 4.8–10.8)
WBC # FLD AUTO: 6.79 K/UL — SIGNIFICANT CHANGE UP (ref 4.8–10.8)
WBC # FLD AUTO: 8.53 K/UL — SIGNIFICANT CHANGE UP (ref 4.8–10.8)

## 2024-10-03 PROCEDURE — 99232 SBSQ HOSP IP/OBS MODERATE 35: CPT

## 2024-10-03 PROCEDURE — 93010 ELECTROCARDIOGRAM REPORT: CPT

## 2024-10-03 PROCEDURE — 99233 SBSQ HOSP IP/OBS HIGH 50: CPT

## 2024-10-03 RX ADMIN — METOPROLOL SUCCINATE 50 MILLIGRAM(S): 50 TABLET, EXTENDED RELEASE ORAL at 05:43

## 2024-10-03 RX ADMIN — RIVAROXABAN 20 MILLIGRAM(S): 10 TABLET, FILM COATED ORAL at 00:21

## 2024-10-03 RX ADMIN — Medication 40 MILLIGRAM(S): at 05:44

## 2024-10-03 RX ADMIN — Medication 50 MILLIGRAM(S): at 06:23

## 2024-10-03 RX ADMIN — METOPROLOL SUCCINATE 50 MILLIGRAM(S): 50 TABLET, EXTENDED RELEASE ORAL at 17:51

## 2024-10-03 RX ADMIN — METOPROLOL SUCCINATE 50 MILLIGRAM(S): 50 TABLET, EXTENDED RELEASE ORAL at 00:21

## 2024-10-03 RX ADMIN — Medication 1 DOSE(S): at 10:06

## 2024-10-03 RX ADMIN — Medication 50 MILLIGRAM(S): at 00:21

## 2024-10-03 RX ADMIN — ATORVASTATIN CALCIUM 10 MILLIGRAM(S): 80 TABLET, FILM COATED ORAL at 21:10

## 2024-10-03 RX ADMIN — Medication 2000 UNIT(S): at 11:16

## 2024-10-03 RX ADMIN — Medication 1 DOSE(S): at 21:10

## 2024-10-03 RX ADMIN — Medication 50 MILLIGRAM(S): at 17:51

## 2024-10-03 RX ADMIN — Medication 325 MILLIGRAM(S): at 11:16

## 2024-10-04 LAB
ALBUMIN SERPL ELPH-MCNC: 4.1 G/DL — SIGNIFICANT CHANGE UP (ref 3.5–5.2)
ALP SERPL-CCNC: 79 U/L — SIGNIFICANT CHANGE UP (ref 30–115)
ALT FLD-CCNC: 29 U/L — SIGNIFICANT CHANGE UP (ref 0–41)
ANION GAP SERPL CALC-SCNC: 13 MMOL/L — SIGNIFICANT CHANGE UP (ref 7–14)
ANION GAP SERPL CALC-SCNC: 13 MMOL/L — SIGNIFICANT CHANGE UP (ref 7–14)
AST SERPL-CCNC: 37 U/L — SIGNIFICANT CHANGE UP (ref 0–41)
BASOPHILS # BLD AUTO: 0.05 K/UL — SIGNIFICANT CHANGE UP (ref 0–0.2)
BASOPHILS NFR BLD AUTO: 0.6 % — SIGNIFICANT CHANGE UP (ref 0–1)
BILIRUB SERPL-MCNC: 0.5 MG/DL — SIGNIFICANT CHANGE UP (ref 0.2–1.2)
BUN SERPL-MCNC: 12 MG/DL — SIGNIFICANT CHANGE UP (ref 10–20)
BUN SERPL-MCNC: 12 MG/DL — SIGNIFICANT CHANGE UP (ref 10–20)
CALCIUM SERPL-MCNC: 8.9 MG/DL — SIGNIFICANT CHANGE UP (ref 8.4–10.5)
CALCIUM SERPL-MCNC: 9.1 MG/DL — SIGNIFICANT CHANGE UP (ref 8.4–10.5)
CHLORIDE SERPL-SCNC: 100 MMOL/L — SIGNIFICANT CHANGE UP (ref 98–110)
CHLORIDE SERPL-SCNC: 100 MMOL/L — SIGNIFICANT CHANGE UP (ref 98–110)
CO2 SERPL-SCNC: 21 MMOL/L — SIGNIFICANT CHANGE UP (ref 17–32)
CO2 SERPL-SCNC: 21 MMOL/L — SIGNIFICANT CHANGE UP (ref 17–32)
CREAT SERPL-MCNC: 0.9 MG/DL — SIGNIFICANT CHANGE UP (ref 0.7–1.5)
CREAT SERPL-MCNC: 0.9 MG/DL — SIGNIFICANT CHANGE UP (ref 0.7–1.5)
EGFR: 65 ML/MIN/1.73M2 — SIGNIFICANT CHANGE UP
EOSINOPHIL # BLD AUTO: 0.14 K/UL — SIGNIFICANT CHANGE UP (ref 0–0.7)
EOSINOPHIL NFR BLD AUTO: 1.6 % — SIGNIFICANT CHANGE UP (ref 0–8)
GLUCOSE SERPL-MCNC: 123 MG/DL — HIGH (ref 70–99)
GLUCOSE SERPL-MCNC: 123 MG/DL — HIGH (ref 70–99)
HCT VFR BLD CALC: 25.3 % — LOW (ref 37–47)
HGB BLD-MCNC: 8.1 G/DL — LOW (ref 12–16)
IMM GRANULOCYTES NFR BLD AUTO: 0.5 % — HIGH (ref 0.1–0.3)
LYMPHOCYTES # BLD AUTO: 1.75 K/UL — SIGNIFICANT CHANGE UP (ref 1.2–3.4)
LYMPHOCYTES # BLD AUTO: 20 % — LOW (ref 20.5–51.1)
MAGNESIUM SERPL-MCNC: 2.1 MG/DL — SIGNIFICANT CHANGE UP (ref 1.8–2.4)
MCHC RBC-ENTMCNC: 28 PG — SIGNIFICANT CHANGE UP (ref 27–31)
MCHC RBC-ENTMCNC: 32 G/DL — SIGNIFICANT CHANGE UP (ref 32–37)
MCV RBC AUTO: 87.5 FL — SIGNIFICANT CHANGE UP (ref 81–99)
MONOCYTES # BLD AUTO: 0.81 K/UL — HIGH (ref 0.1–0.6)
MONOCYTES NFR BLD AUTO: 9.3 % — SIGNIFICANT CHANGE UP (ref 1.7–9.3)
NEUTROPHILS # BLD AUTO: 5.95 K/UL — SIGNIFICANT CHANGE UP (ref 1.4–6.5)
NEUTROPHILS NFR BLD AUTO: 68 % — SIGNIFICANT CHANGE UP (ref 42.2–75.2)
NRBC # BLD: 0 /100 WBCS — SIGNIFICANT CHANGE UP (ref 0–0)
NRBC BLD-RTO: 0 /100 WBCS — SIGNIFICANT CHANGE UP (ref 0–0)
PHOSPHATE SERPL-MCNC: 3.5 MG/DL — SIGNIFICANT CHANGE UP (ref 2.1–4.9)
PLATELET # BLD AUTO: 196 K/UL — SIGNIFICANT CHANGE UP (ref 130–400)
PMV BLD: 11.6 FL — HIGH (ref 7.4–10.4)
POTASSIUM SERPL-MCNC: 4 MMOL/L — SIGNIFICANT CHANGE UP (ref 3.5–5)
POTASSIUM SERPL-MCNC: 4.1 MMOL/L — SIGNIFICANT CHANGE UP (ref 3.5–5)
POTASSIUM SERPL-SCNC: 4 MMOL/L — SIGNIFICANT CHANGE UP (ref 3.5–5)
POTASSIUM SERPL-SCNC: 4.1 MMOL/L — SIGNIFICANT CHANGE UP (ref 3.5–5)
PROT SERPL-MCNC: 6 G/DL — SIGNIFICANT CHANGE UP (ref 6–8)
RBC # BLD: 2.89 M/UL — LOW (ref 4.2–5.4)
RBC # FLD: 17.6 % — HIGH (ref 11.5–14.5)
SODIUM SERPL-SCNC: 134 MMOL/L — LOW (ref 135–146)
SODIUM SERPL-SCNC: 134 MMOL/L — LOW (ref 135–146)
SURGICAL PATHOLOGY STUDY: SIGNIFICANT CHANGE UP
WBC # BLD: 8.74 K/UL — SIGNIFICANT CHANGE UP (ref 4.8–10.8)
WBC # FLD AUTO: 8.74 K/UL — SIGNIFICANT CHANGE UP (ref 4.8–10.8)

## 2024-10-04 PROCEDURE — 99232 SBSQ HOSP IP/OBS MODERATE 35: CPT

## 2024-10-04 PROCEDURE — 93280 PM DEVICE PROGR EVAL DUAL: CPT | Mod: 26

## 2024-10-04 RX ORDER — RIVAROXABAN 10 MG/1
20 TABLET, FILM COATED ORAL DAILY
Refills: 0 | Status: DISCONTINUED | OUTPATIENT
Start: 2024-10-04 | End: 2024-10-05

## 2024-10-04 RX ORDER — LOSARTAN POTASSIUM 100 MG/1
50 TABLET, FILM COATED ORAL ONCE
Refills: 0 | Status: COMPLETED | OUTPATIENT
Start: 2024-10-04 | End: 2024-10-04

## 2024-10-04 RX ORDER — LOSARTAN POTASSIUM 100 MG/1
50 TABLET, FILM COATED ORAL AT BEDTIME
Refills: 0 | Status: DISCONTINUED | OUTPATIENT
Start: 2024-10-04 | End: 2024-10-05

## 2024-10-04 RX ORDER — POLYETHYLENE GLYCOL 3350 17 G/17G
17 POWDER, FOR SOLUTION ORAL DAILY
Refills: 0 | Status: DISCONTINUED | OUTPATIENT
Start: 2024-10-04 | End: 2024-10-05

## 2024-10-04 RX ADMIN — Medication 1 DOSE(S): at 08:33

## 2024-10-04 RX ADMIN — RIVAROXABAN 20 MILLIGRAM(S): 10 TABLET, FILM COATED ORAL at 12:14

## 2024-10-04 RX ADMIN — Medication 50 MILLIGRAM(S): at 05:26

## 2024-10-04 RX ADMIN — Medication 10 MILLIGRAM(S): at 06:59

## 2024-10-04 RX ADMIN — METOPROLOL SUCCINATE 50 MILLIGRAM(S): 50 TABLET, EXTENDED RELEASE ORAL at 05:26

## 2024-10-04 RX ADMIN — METOPROLOL SUCCINATE 50 MILLIGRAM(S): 50 TABLET, EXTENDED RELEASE ORAL at 18:44

## 2024-10-04 RX ADMIN — Medication 325 MILLIGRAM(S): at 12:14

## 2024-10-04 RX ADMIN — Medication 50 MILLIGRAM(S): at 18:44

## 2024-10-04 RX ADMIN — Medication 2000 UNIT(S): at 12:14

## 2024-10-04 RX ADMIN — Medication 40 MILLIGRAM(S): at 05:26

## 2024-10-04 RX ADMIN — POLYETHYLENE GLYCOL 3350 17 GRAM(S): 17 POWDER, FOR SOLUTION ORAL at 12:13

## 2024-10-04 RX ADMIN — LOSARTAN POTASSIUM 50 MILLIGRAM(S): 100 TABLET, FILM COATED ORAL at 01:05

## 2024-10-04 RX ADMIN — ATORVASTATIN CALCIUM 10 MILLIGRAM(S): 80 TABLET, FILM COATED ORAL at 21:01

## 2024-10-04 RX ADMIN — LOSARTAN POTASSIUM 50 MILLIGRAM(S): 100 TABLET, FILM COATED ORAL at 21:00

## 2024-10-05 ENCOUNTER — TRANSCRIPTION ENCOUNTER (OUTPATIENT)
Age: 79
End: 2024-10-05

## 2024-10-05 VITALS
DIASTOLIC BLOOD PRESSURE: 70 MMHG | SYSTOLIC BLOOD PRESSURE: 135 MMHG | HEART RATE: 71 BPM | TEMPERATURE: 99 F | RESPIRATION RATE: 18 BRPM

## 2024-10-05 LAB
ANION GAP SERPL CALC-SCNC: 10 MMOL/L — SIGNIFICANT CHANGE UP (ref 7–14)
BASOPHILS # BLD AUTO: 0.06 K/UL — SIGNIFICANT CHANGE UP (ref 0–0.2)
BASOPHILS NFR BLD AUTO: 0.8 % — SIGNIFICANT CHANGE UP (ref 0–1)
BUN SERPL-MCNC: 13 MG/DL — SIGNIFICANT CHANGE UP (ref 10–20)
CALCIUM SERPL-MCNC: 8.8 MG/DL — SIGNIFICANT CHANGE UP (ref 8.4–10.5)
CHLORIDE SERPL-SCNC: 103 MMOL/L — SIGNIFICANT CHANGE UP (ref 98–110)
CO2 SERPL-SCNC: 21 MMOL/L — SIGNIFICANT CHANGE UP (ref 17–32)
CREAT SERPL-MCNC: 1.1 MG/DL — SIGNIFICANT CHANGE UP (ref 0.7–1.5)
EGFR: 51 ML/MIN/1.73M2 — LOW
EGFR: 51 ML/MIN/1.73M2 — LOW
EOSINOPHIL # BLD AUTO: 0.15 K/UL — SIGNIFICANT CHANGE UP (ref 0–0.7)
EOSINOPHIL NFR BLD AUTO: 2 % — SIGNIFICANT CHANGE UP (ref 0–8)
GLUCOSE SERPL-MCNC: 95 MG/DL — SIGNIFICANT CHANGE UP (ref 70–99)
HCT VFR BLD CALC: 23.5 % — LOW (ref 37–47)
HGB BLD-MCNC: 7.4 G/DL — LOW (ref 12–16)
IMM GRANULOCYTES NFR BLD AUTO: 0.3 % — SIGNIFICANT CHANGE UP (ref 0.1–0.3)
LYMPHOCYTES # BLD AUTO: 2.04 K/UL — SIGNIFICANT CHANGE UP (ref 1.2–3.4)
LYMPHOCYTES # BLD AUTO: 26.7 % — SIGNIFICANT CHANGE UP (ref 20.5–51.1)
MCHC RBC-ENTMCNC: 27.9 PG — SIGNIFICANT CHANGE UP (ref 27–31)
MCHC RBC-ENTMCNC: 31.5 G/DL — LOW (ref 32–37)
MCV RBC AUTO: 88.7 FL — SIGNIFICANT CHANGE UP (ref 81–99)
MONOCYTES # BLD AUTO: 0.76 K/UL — HIGH (ref 0.1–0.6)
MONOCYTES NFR BLD AUTO: 9.9 % — HIGH (ref 1.7–9.3)
NEUTROPHILS # BLD AUTO: 4.61 K/UL — SIGNIFICANT CHANGE UP (ref 1.4–6.5)
NEUTROPHILS NFR BLD AUTO: 60.3 % — SIGNIFICANT CHANGE UP (ref 42.2–75.2)
NRBC # BLD: 0 /100 WBCS — SIGNIFICANT CHANGE UP (ref 0–0)
NRBC BLD-RTO: 0 /100 WBCS — SIGNIFICANT CHANGE UP (ref 0–0)
PLATELET # BLD AUTO: 172 K/UL — SIGNIFICANT CHANGE UP (ref 130–400)
PMV BLD: 11.5 FL — HIGH (ref 7.4–10.4)
POTASSIUM SERPL-MCNC: 4.1 MMOL/L — SIGNIFICANT CHANGE UP (ref 3.5–5)
POTASSIUM SERPL-SCNC: 4.1 MMOL/L — SIGNIFICANT CHANGE UP (ref 3.5–5)
RBC # BLD: 2.65 M/UL — LOW (ref 4.2–5.4)
RBC # FLD: 17.6 % — HIGH (ref 11.5–14.5)
SODIUM SERPL-SCNC: 134 MMOL/L — LOW (ref 135–146)
WBC # BLD: 7.64 K/UL — SIGNIFICANT CHANGE UP (ref 4.8–10.8)
WBC # FLD AUTO: 7.64 K/UL — SIGNIFICANT CHANGE UP (ref 4.8–10.8)

## 2024-10-05 PROCEDURE — 99239 HOSP IP/OBS DSCHRG MGMT >30: CPT

## 2024-10-05 RX ORDER — LOSARTAN POTASSIUM 100 MG/1
1 TABLET, FILM COATED ORAL
Qty: 0 | Refills: 0 | DISCHARGE
Start: 2024-10-05

## 2024-10-05 RX ORDER — FERROUS SULFATE 137(45) MG
1 TABLET, EXTENDED RELEASE ORAL
Qty: 0 | Refills: 0 | DISCHARGE
Start: 2024-10-05

## 2024-10-05 RX ORDER — LOSARTAN POTASSIUM 100 MG/1
1 TABLET, FILM COATED ORAL
Qty: 30 | Refills: 2
Start: 2024-10-05 | End: 2025-01-02

## 2024-10-05 RX ADMIN — METOPROLOL SUCCINATE 50 MILLIGRAM(S): 50 TABLET, EXTENDED RELEASE ORAL at 05:14

## 2024-10-05 RX ADMIN — RIVAROXABAN 20 MILLIGRAM(S): 10 TABLET, FILM COATED ORAL at 13:15

## 2024-10-05 RX ADMIN — Medication 40 MILLIGRAM(S): at 05:14

## 2024-10-05 RX ADMIN — Medication 2000 UNIT(S): at 13:15

## 2024-10-05 RX ADMIN — Medication 50 MILLIGRAM(S): at 05:14

## 2024-10-05 RX ADMIN — POLYETHYLENE GLYCOL 3350 17 GRAM(S): 17 POWDER, FOR SOLUTION ORAL at 13:16

## 2024-10-05 RX ADMIN — Medication 325 MILLIGRAM(S): at 13:15

## 2024-10-19 DIAGNOSIS — K63.5 POLYP OF COLON: ICD-10-CM

## 2024-10-19 DIAGNOSIS — I10 ESSENTIAL (PRIMARY) HYPERTENSION: ICD-10-CM

## 2024-10-19 DIAGNOSIS — I48.20 CHRONIC ATRIAL FIBRILLATION, UNSPECIFIED: ICD-10-CM

## 2024-10-19 DIAGNOSIS — Z79.01 LONG TERM (CURRENT) USE OF ANTICOAGULANTS: ICD-10-CM

## 2024-10-19 DIAGNOSIS — Z95.0 PRESENCE OF CARDIAC PACEMAKER: ICD-10-CM

## 2024-10-19 DIAGNOSIS — Z86.73 PERSONAL HISTORY OF TRANSIENT ISCHEMIC ATTACK (TIA), AND CEREBRAL INFARCTION WITHOUT RESIDUAL DEFICITS: ICD-10-CM

## 2024-10-19 DIAGNOSIS — Z88.1 ALLERGY STATUS TO OTHER ANTIBIOTIC AGENTS: ICD-10-CM

## 2024-10-19 DIAGNOSIS — D12.8 BENIGN NEOPLASM OF RECTUM: ICD-10-CM

## 2024-10-19 DIAGNOSIS — Z79.82 LONG TERM (CURRENT) USE OF ASPIRIN: ICD-10-CM

## 2024-10-19 DIAGNOSIS — K91.840 POSTPROCEDURAL HEMORRHAGE OF A DIGESTIVE SYSTEM ORGAN OR STRUCTURE FOLLOWING A DIGESTIVE SYSTEM PROCEDURE: ICD-10-CM

## 2024-10-19 DIAGNOSIS — N39.41 URGE INCONTINENCE: ICD-10-CM

## 2024-10-19 DIAGNOSIS — N32.81 OVERACTIVE BLADDER: ICD-10-CM

## 2024-10-19 DIAGNOSIS — Z79.899 OTHER LONG TERM (CURRENT) DRUG THERAPY: ICD-10-CM

## 2024-10-19 DIAGNOSIS — D62 ACUTE POSTHEMORRHAGIC ANEMIA: ICD-10-CM

## 2024-10-19 DIAGNOSIS — J45.909 UNSPECIFIED ASTHMA, UNCOMPLICATED: ICD-10-CM

## 2024-10-19 DIAGNOSIS — Z88.2 ALLERGY STATUS TO SULFONAMIDES: ICD-10-CM

## 2024-10-19 DIAGNOSIS — E78.5 HYPERLIPIDEMIA, UNSPECIFIED: ICD-10-CM

## 2024-10-19 DIAGNOSIS — Z79.51 LONG TERM (CURRENT) USE OF INHALED STEROIDS: ICD-10-CM

## 2024-10-19 DIAGNOSIS — E87.1 HYPO-OSMOLALITY AND HYPONATREMIA: ICD-10-CM

## 2024-12-17 ENCOUNTER — APPOINTMENT (OUTPATIENT)
Dept: CARDIOLOGY | Facility: CLINIC | Age: 79
End: 2024-12-17

## 2024-12-17 PROCEDURE — 93294 REM INTERROG EVL PM/LDLS PM: CPT

## 2024-12-17 PROCEDURE — 93296 REM INTERROG EVL PM/IDS: CPT

## 2024-12-18 NOTE — ED PROVIDER NOTE - CARE PROVIDER_API CALL
no Anthony Rivas)  EEGEpilepsy; Neurology  [address] [place] [address], [address]  [place] [address]  Phone: [phone]  Fax: [phone]  Follow Up Time: 1-3 Days

## 2025-01-17 ENCOUNTER — APPOINTMENT (OUTPATIENT)
Dept: ELECTROPHYSIOLOGY | Facility: CLINIC | Age: 80
End: 2025-01-17

## 2025-01-17 VITALS
DIASTOLIC BLOOD PRESSURE: 63 MMHG | SYSTOLIC BLOOD PRESSURE: 109 MMHG | TEMPERATURE: 98 F | WEIGHT: 137 LBS | HEART RATE: 68 BPM | HEIGHT: 65 IN | BODY MASS INDEX: 22.82 KG/M2

## 2025-01-17 DIAGNOSIS — I48.91 UNSPECIFIED ATRIAL FIBRILLATION: ICD-10-CM

## 2025-01-17 DIAGNOSIS — I44.2 ATRIOVENTRICULAR BLOCK, COMPLETE: ICD-10-CM

## 2025-01-17 PROCEDURE — 93280 PM DEVICE PROGR EVAL DUAL: CPT

## 2025-01-17 PROCEDURE — G2211 COMPLEX E/M VISIT ADD ON: CPT

## 2025-01-17 PROCEDURE — 99214 OFFICE O/P EST MOD 30 MIN: CPT

## 2025-03-21 ENCOUNTER — APPOINTMENT (OUTPATIENT)
Dept: ELECTROPHYSIOLOGY | Facility: CLINIC | Age: 80
End: 2025-03-21

## 2025-03-21 ENCOUNTER — NON-APPOINTMENT (OUTPATIENT)
Age: 80
End: 2025-03-21

## 2025-03-21 VITALS
TEMPERATURE: 97.1 F | HEART RATE: 76 BPM | SYSTOLIC BLOOD PRESSURE: 135 MMHG | HEIGHT: 64 IN | DIASTOLIC BLOOD PRESSURE: 76 MMHG | WEIGHT: 140 LBS | BODY MASS INDEX: 23.9 KG/M2

## 2025-03-21 DIAGNOSIS — I44.2 ATRIOVENTRICULAR BLOCK, COMPLETE: ICD-10-CM

## 2025-03-21 DIAGNOSIS — I48.91 UNSPECIFIED ATRIAL FIBRILLATION: ICD-10-CM

## 2025-03-21 PROCEDURE — 99214 OFFICE O/P EST MOD 30 MIN: CPT

## 2025-03-21 PROCEDURE — 93280 PM DEVICE PROGR EVAL DUAL: CPT

## 2025-03-21 PROCEDURE — G2211 COMPLEX E/M VISIT ADD ON: CPT

## 2025-05-09 ENCOUNTER — APPOINTMENT (OUTPATIENT)
Dept: ELECTROPHYSIOLOGY | Facility: CLINIC | Age: 80
End: 2025-05-09
Payer: MEDICARE

## 2025-05-09 VITALS
WEIGHT: 132 LBS | BODY MASS INDEX: 22.53 KG/M2 | HEIGHT: 64 IN | SYSTOLIC BLOOD PRESSURE: 119 MMHG | DIASTOLIC BLOOD PRESSURE: 68 MMHG | HEART RATE: 87 BPM

## 2025-05-09 DIAGNOSIS — I49.5 SICK SINUS SYNDROME: ICD-10-CM

## 2025-05-09 DIAGNOSIS — I48.92 UNSPECIFIED ATRIAL FLUTTER: ICD-10-CM

## 2025-05-09 PROCEDURE — 99215 OFFICE O/P EST HI 40 MIN: CPT | Mod: 57

## 2025-05-09 PROCEDURE — G2211 COMPLEX E/M VISIT ADD ON: CPT

## 2025-05-09 PROCEDURE — 93280 PM DEVICE PROGR EVAL DUAL: CPT

## 2025-05-12 ENCOUNTER — APPOINTMENT (OUTPATIENT)
Dept: ELECTROPHYSIOLOGY | Facility: CLINIC | Age: 80
End: 2025-05-12

## 2025-05-12 RX ORDER — FLUTICASONE PROPIONATE AND SALMETEROL 250; 50 UG/1; UG/1
250-50 POWDER RESPIRATORY (INHALATION)
Refills: 0 | Status: ACTIVE | COMMUNITY

## 2025-06-12 ENCOUNTER — OUTPATIENT (OUTPATIENT)
Dept: OUTPATIENT SERVICES | Facility: HOSPITAL | Age: 80
LOS: 1 days | End: 2025-06-12
Payer: MEDICARE

## 2025-06-12 VITALS
WEIGHT: 138.01 LBS | RESPIRATION RATE: 16 BRPM | TEMPERATURE: 98 F | DIASTOLIC BLOOD PRESSURE: 71 MMHG | SYSTOLIC BLOOD PRESSURE: 136 MMHG | HEART RATE: 68 BPM | OXYGEN SATURATION: 99 % | HEIGHT: 62 IN

## 2025-06-12 DIAGNOSIS — Z98.890 OTHER SPECIFIED POSTPROCEDURAL STATES: Chronic | ICD-10-CM

## 2025-06-12 DIAGNOSIS — Z01.818 ENCOUNTER FOR OTHER PREPROCEDURAL EXAMINATION: ICD-10-CM

## 2025-06-12 DIAGNOSIS — Z95.0 PRESENCE OF CARDIAC PACEMAKER: Chronic | ICD-10-CM

## 2025-06-12 DIAGNOSIS — I49.5 SICK SINUS SYNDROME: ICD-10-CM

## 2025-06-12 LAB
ALBUMIN SERPL ELPH-MCNC: 4.3 G/DL — SIGNIFICANT CHANGE UP (ref 3.5–5.2)
ALP SERPL-CCNC: 100 U/L — SIGNIFICANT CHANGE UP (ref 30–115)
ALT FLD-CCNC: 18 U/L — SIGNIFICANT CHANGE UP (ref 0–41)
ANION GAP SERPL CALC-SCNC: 12 MMOL/L — SIGNIFICANT CHANGE UP (ref 7–14)
APPEARANCE UR: CLEAR — SIGNIFICANT CHANGE UP
AST SERPL-CCNC: 27 U/L — SIGNIFICANT CHANGE UP (ref 0–41)
BASOPHILS # BLD AUTO: 0.06 K/UL — SIGNIFICANT CHANGE UP (ref 0–0.2)
BASOPHILS NFR BLD AUTO: 0.7 % — SIGNIFICANT CHANGE UP (ref 0–1)
BILIRUB SERPL-MCNC: 0.8 MG/DL — SIGNIFICANT CHANGE UP (ref 0.2–1.2)
BILIRUB UR-MCNC: NEGATIVE — SIGNIFICANT CHANGE UP
BLD GP AB SCN SERPL QL: SIGNIFICANT CHANGE UP
BUN SERPL-MCNC: 26 MG/DL — HIGH (ref 10–20)
CALCIUM SERPL-MCNC: 9.9 MG/DL — SIGNIFICANT CHANGE UP (ref 8.4–10.5)
CHLORIDE SERPL-SCNC: 100 MMOL/L — SIGNIFICANT CHANGE UP (ref 98–110)
CO2 SERPL-SCNC: 22 MMOL/L — SIGNIFICANT CHANGE UP (ref 17–32)
COLOR SPEC: YELLOW — SIGNIFICANT CHANGE UP
CREAT SERPL-MCNC: 1.2 MG/DL — SIGNIFICANT CHANGE UP (ref 0.7–1.5)
DIFF PNL FLD: NEGATIVE — SIGNIFICANT CHANGE UP
EGFR: 46 ML/MIN/1.73M2 — LOW
EGFR: 46 ML/MIN/1.73M2 — LOW
EOSINOPHIL # BLD AUTO: 0.11 K/UL — SIGNIFICANT CHANGE UP (ref 0–0.7)
EOSINOPHIL NFR BLD AUTO: 1.3 % — SIGNIFICANT CHANGE UP (ref 0–8)
GLUCOSE SERPL-MCNC: 93 MG/DL — SIGNIFICANT CHANGE UP (ref 70–99)
GLUCOSE UR QL: NEGATIVE MG/DL — SIGNIFICANT CHANGE UP
HCT VFR BLD CALC: 34.8 % — LOW (ref 37–47)
HGB BLD-MCNC: 11.7 G/DL — LOW (ref 12–16)
IMM GRANULOCYTES NFR BLD AUTO: 0.4 % — HIGH (ref 0.1–0.3)
KETONES UR QL: NEGATIVE MG/DL — SIGNIFICANT CHANGE UP
LEUKOCYTE ESTERASE UR-ACNC: NEGATIVE — SIGNIFICANT CHANGE UP
LYMPHOCYTES # BLD AUTO: 1.94 K/UL — SIGNIFICANT CHANGE UP (ref 1.2–3.4)
LYMPHOCYTES # BLD AUTO: 23 % — SIGNIFICANT CHANGE UP (ref 20.5–51.1)
MCHC RBC-ENTMCNC: 32 PG — HIGH (ref 27–31)
MCHC RBC-ENTMCNC: 33.6 G/DL — SIGNIFICANT CHANGE UP (ref 32–37)
MCV RBC AUTO: 95.1 FL — SIGNIFICANT CHANGE UP (ref 81–99)
MONOCYTES # BLD AUTO: 0.57 K/UL — SIGNIFICANT CHANGE UP (ref 0.1–0.6)
MONOCYTES NFR BLD AUTO: 6.8 % — SIGNIFICANT CHANGE UP (ref 1.7–9.3)
MRSA PCR RESULT.: NEGATIVE — SIGNIFICANT CHANGE UP
NEUTROPHILS # BLD AUTO: 5.72 K/UL — SIGNIFICANT CHANGE UP (ref 1.4–6.5)
NEUTROPHILS NFR BLD AUTO: 67.8 % — SIGNIFICANT CHANGE UP (ref 42.2–75.2)
NITRITE UR-MCNC: NEGATIVE — SIGNIFICANT CHANGE UP
NRBC BLD AUTO-RTO: 0 /100 WBCS — SIGNIFICANT CHANGE UP (ref 0–0)
PH UR: 6.5 — SIGNIFICANT CHANGE UP (ref 5–8)
PLATELET # BLD AUTO: 190 K/UL — SIGNIFICANT CHANGE UP (ref 130–400)
PMV BLD: 12.2 FL — HIGH (ref 7.4–10.4)
POTASSIUM SERPL-MCNC: 4.5 MMOL/L — SIGNIFICANT CHANGE UP (ref 3.5–5)
POTASSIUM SERPL-SCNC: 4.5 MMOL/L — SIGNIFICANT CHANGE UP (ref 3.5–5)
PROT SERPL-MCNC: 7 G/DL — SIGNIFICANT CHANGE UP (ref 6–8)
PROT UR-MCNC: NEGATIVE MG/DL — SIGNIFICANT CHANGE UP
RBC # BLD: 3.66 M/UL — LOW (ref 4.2–5.4)
RBC # FLD: 14.3 % — SIGNIFICANT CHANGE UP (ref 11.5–14.5)
SODIUM SERPL-SCNC: 134 MMOL/L — LOW (ref 135–146)
SP GR SPEC: 1.01 — SIGNIFICANT CHANGE UP (ref 1–1.03)
UROBILINOGEN FLD QL: 0.2 MG/DL — SIGNIFICANT CHANGE UP (ref 0.2–1)
WBC # BLD: 8.43 K/UL — SIGNIFICANT CHANGE UP (ref 4.8–10.8)
WBC # FLD AUTO: 8.43 K/UL — SIGNIFICANT CHANGE UP (ref 4.8–10.8)

## 2025-06-12 PROCEDURE — 85025 COMPLETE CBC W/AUTO DIFF WBC: CPT

## 2025-06-12 PROCEDURE — 36415 COLL VENOUS BLD VENIPUNCTURE: CPT

## 2025-06-12 PROCEDURE — 87640 STAPH A DNA AMP PROBE: CPT

## 2025-06-12 PROCEDURE — 86900 BLOOD TYPING SEROLOGIC ABO: CPT

## 2025-06-12 PROCEDURE — 86850 RBC ANTIBODY SCREEN: CPT

## 2025-06-12 PROCEDURE — 80053 COMPREHEN METABOLIC PANEL: CPT

## 2025-06-12 PROCEDURE — 81003 URINALYSIS AUTO W/O SCOPE: CPT

## 2025-06-12 PROCEDURE — 99214 OFFICE O/P EST MOD 30 MIN: CPT | Mod: 25

## 2025-06-12 PROCEDURE — 87641 MR-STAPH DNA AMP PROBE: CPT

## 2025-06-12 PROCEDURE — 86901 BLOOD TYPING SEROLOGIC RH(D): CPT

## 2025-06-12 PROCEDURE — 87086 URINE CULTURE/COLONY COUNT: CPT

## 2025-06-12 RX ORDER — TROSPIUM CHLORIDE 20 MG/1
1 TABLET, FILM COATED ORAL
Refills: 0 | DISCHARGE

## 2025-06-12 NOTE — H&P PST ADULT - REASON FOR ADMISSION
80 y/o female here for PAST. H/O HTN, MVR MAZE, AF/AFL s/p AF ablation x2. On flecanide. Had a dual chamber PPM implanted for symptomatic intermittent CHB on 1/29/2018. TIA 2002 CHADVSC 6. Per chart, the atrial lead has poor sensing, and she is pacing more than 40% in the V. Now needs an implantation of new atrial and CS leads.  Now for scheduled New PM lead implant atrial lead/LV lead.

## 2025-06-12 NOTE — H&P PST ADULT - NSICDXPASTMEDICALHX_GEN_ALL_CORE_FT
PAST MEDICAL HISTORY:  H/O mitral valve repair 2003    History of atrial fibrillation     Hyperlipidemia     Hypertension     Overactive bladder     TIA (transient ischemic attack)

## 2025-06-12 NOTE — H&P PST ADULT - NSICDXPASTSURGICALHX_GEN_ALL_CORE_FT
PAST SURGICAL HISTORY:  Cardiac pacemaker     H/O maze procedure     H/O mitral valve repair     S/P ablation of atrial fibrillation 2x (2011 and 2012)

## 2025-06-12 NOTE — H&P PST ADULT - NEUROLOGICAL
Request for MRI of lumbar spine w/o contrast and ct lumbar w/o contrast requested by theodore --ALICE-- Cheri Michaels    Order placed   Please process Nicky  
normal/cranial nerves II-XII intact/sensation intact
2023 20:15

## 2025-06-12 NOTE — H&P PST ADULT - HISTORY OF PRESENT ILLNESS
PATIENT DENIES CHEST PAIN, , PALPITATIONS, COUGHING, FEVER, DYSURIA.  + SHORTNESS OF BREATH WITH EXERTION.    NO COUGH, FEVER, SORE THROAT, HEADACHE, LOSS OF TASTE OR SMELL. NO KNOWN EXPOSURE TO ANYONE WITH COVID. PATIENT WAS INSTRUCTED TO ISOLATE FROM NOW UNTIL THE SURGERY.    Anesthesia Alert  NO--Difficult Airway  NO--History of neck surgery or radiation  NO--Limited ROM of neck  NO--History of Malignant hyperthermia  NO--Personal or family history of Pseudocholinesterase deficiency  NO--Prior Anesthesia Complication  NO--Latex Allergy  NO--Loose teeth  NO--History of Rheumatoid Arthritis  NO--AVERY  + bleeding risk (on xarelto)  PPM    RCRI=1  DASI= 5.38 METS

## 2025-06-13 DIAGNOSIS — Z01.818 ENCOUNTER FOR OTHER PREPROCEDURAL EXAMINATION: ICD-10-CM

## 2025-06-13 DIAGNOSIS — I49.5 SICK SINUS SYNDROME: ICD-10-CM

## 2025-06-15 LAB
CULTURE RESULTS: SIGNIFICANT CHANGE UP
SPECIMEN SOURCE: SIGNIFICANT CHANGE UP

## 2025-06-25 ENCOUNTER — OUTPATIENT (OUTPATIENT)
Dept: OUTPATIENT SERVICES | Facility: HOSPITAL | Age: 80
LOS: 1 days | Discharge: HOME | End: 2025-06-25
Payer: MEDICARE

## 2025-06-25 ENCOUNTER — TRANSCRIPTION ENCOUNTER (OUTPATIENT)
Age: 80
End: 2025-06-25

## 2025-06-25 VITALS
SYSTOLIC BLOOD PRESSURE: 145 MMHG | DIASTOLIC BLOOD PRESSURE: 65 MMHG | OXYGEN SATURATION: 97 % | RESPIRATION RATE: 13 BRPM | HEART RATE: 60 BPM

## 2025-06-25 VITALS
HEART RATE: 88 BPM | RESPIRATION RATE: 17 BRPM | SYSTOLIC BLOOD PRESSURE: 138 MMHG | OXYGEN SATURATION: 95 % | WEIGHT: 134.92 LBS | DIASTOLIC BLOOD PRESSURE: 73 MMHG | TEMPERATURE: 98 F | HEIGHT: 62 IN

## 2025-06-25 DIAGNOSIS — I87.1 COMPRESSION OF VEIN: ICD-10-CM

## 2025-06-25 DIAGNOSIS — Z98.890 OTHER SPECIFIED POSTPROCEDURAL STATES: Chronic | ICD-10-CM

## 2025-06-25 DIAGNOSIS — Z95.0 PRESENCE OF CARDIAC PACEMAKER: Chronic | ICD-10-CM

## 2025-06-25 DIAGNOSIS — I48.91 UNSPECIFIED ATRIAL FIBRILLATION: ICD-10-CM

## 2025-06-25 LAB — BLD GP AB SCN SERPL QL: SIGNIFICANT CHANGE UP

## 2025-06-25 PROCEDURE — 86900 BLOOD TYPING SEROLOGIC ABO: CPT

## 2025-06-25 PROCEDURE — C1769: CPT

## 2025-06-25 PROCEDURE — C1730: CPT

## 2025-06-25 PROCEDURE — 75820 VEIN X-RAY ARM/LEG: CPT

## 2025-06-25 PROCEDURE — 86901 BLOOD TYPING SEROLOGIC RH(D): CPT

## 2025-06-25 PROCEDURE — C1725: CPT

## 2025-06-25 PROCEDURE — C1894: CPT

## 2025-06-25 PROCEDURE — 36415 COLL VENOUS BLD VENIPUNCTURE: CPT

## 2025-06-25 PROCEDURE — 86850 RBC ANTIBODY SCREEN: CPT

## 2025-06-25 PROCEDURE — 36005 INJECTION EXT VENOGRAPHY: CPT

## 2025-06-25 RX ORDER — CEFAZOLIN SODIUM IN 0.9 % NACL 3 G/100 ML
INTRAVENOUS SOLUTION, PIGGYBACK (ML) INTRAVENOUS
Refills: 0 | Status: COMPLETED | OUTPATIENT
Start: 2025-06-25 | End: 2025-06-26

## 2025-06-25 RX ORDER — VIBEGRON 75 MG/1
1 TABLET, FILM COATED ORAL
Refills: 0 | DISCHARGE

## 2025-06-25 RX ORDER — CEFAZOLIN SODIUM IN 0.9 % NACL 3 G/100 ML
1000 INTRAVENOUS SOLUTION, PIGGYBACK (ML) INTRAVENOUS ONCE
Refills: 0 | Status: DISCONTINUED | OUTPATIENT
Start: 2025-06-25 | End: 2025-06-25

## 2025-06-25 RX ORDER — CEFAZOLIN SODIUM IN 0.9 % NACL 3 G/100 ML
2000 INTRAVENOUS SOLUTION, PIGGYBACK (ML) INTRAVENOUS ONCE
Refills: 0 | Status: COMPLETED | OUTPATIENT
Start: 2025-06-25 | End: 2025-06-25

## 2025-06-25 RX ORDER — CEFAZOLIN SODIUM IN 0.9 % NACL 3 G/100 ML
1000 INTRAVENOUS SOLUTION, PIGGYBACK (ML) INTRAVENOUS EVERY 8 HOURS
Refills: 0 | Status: COMPLETED | OUTPATIENT
Start: 2025-06-25 | End: 2025-06-26

## 2025-06-25 RX ORDER — CYCLOSPORINE OPHTHALMIC SOLUTION 1 MG/ML
1 SOLUTION/ DROPS OPHTHALMIC
Refills: 0 | DISCHARGE

## 2025-06-25 NOTE — ASU DISCHARGE PLAN (ADULT/PEDIATRIC) - FINANCIAL ASSISTANCE
[facility] provides services at a reduced cost to those who are determined to be eligible through [facility]’s financial assistance program. Information regarding [facility]’s financial assistance program can be found by going to [email or web address] or by calling [phone].

## 2025-06-25 NOTE — PACU DISCHARGE NOTE - NSPTMEETSDISCHCRITERIADT_GEN_A_CORE
25-Jun-2025 12:42 Home medication:. Losartan Potassium-HCTZ 100-12.5 MG qd.  - Restart home medications. Home medication:. Losartan Potassium-HCTZ 100-12.5 MG qd.  BP stable at 117/74    Plan:  -hold home antihypertensive

## 2025-06-25 NOTE — PRE-ANESTHESIA EVALUATION ADULT - NSANTHAGERD_ENT_A_CORE
May 20, 2020     Miranda Bingham  [address]  [address]  [facility] [address]      Dear Miranda Bingham:      We received a referral from your primary care provider, Dr. Ariza  to schedule you for a colonoscopy.  · What is a Colonoscopy?  A colonoscopy is an exam of the colon (large intestine or bowel) with a slim, flexible lighted tube called a colonoscope. Your health care provider can use the colonoscope to get a clear , magnified view of the inside of your colon from the anus to the area near the appendix.     There’s no question about it - preventive testing can save lives or can help stop a disease process in its tracks. Many health problems start out silently without symptoms. Testing is often the only way to catch these problems in early stages, when they can be more successfully treated.Colorectal cancer usually comes from polyps (abnormal growths) in the colon or rectum. A Colonoscopy can find the polyps and remove them before they turn to cancer.     These procedures are done on Tuesday, Wednesday and Friday mornings and you will need a responsible adult to drive you home after the procedure.      Please contact our office at [phone] to schedule this outpatient procedure.  If you would prefer not to schedule at this time, please notify our office so we may remove you from our call list.  If you have chosen to complete your screening colonoscopy outside of the [facility] system, please contact our office so we can update your health records.     We look forward to hear from you soon.    Rubin Zimmerman MD, [facility], [facility]  General/[place]-Rectal Surgery Dept  [address] [place] [address] Dr. Oscar IVERSON  [id number]  Phone:  [phone]  
Yes

## 2025-06-25 NOTE — ASU PATIENT PROFILE, ADULT - FALL HARM RISK - HARM RISK INTERVENTIONS

## 2025-06-25 NOTE — ASU DISCHARGE PLAN (ADULT/PEDIATRIC) - CARE PROVIDER_API CALL
Johnson Hinton  Clinical Cardiac Electrophysiology  [address] [place] [address], [address]  [place] [address]  Phone: [phone]  Fax: [phone]  Follow Up Time:

## 2025-06-25 NOTE — PROGRESS NOTE ADULT - SUBJECTIVE AND OBJECTIVE BOX
Electrophysiology Brief Post-Op Note      PRE-OP DIAGNOSIS: whit    POST-OP DIAGNOSIS: whit    PROCEDURE: venogram    Vendor Representative was present for clinical support.    Physician: Jamaal  Assistant: None    ESTIMATED BLOOD LOSS:  0     mL    ANESTHESIA TYPE:  [  ]General Anesthesia  [X  ] Sedation  [X  ] Local/Regional    CONDITION  [  ] Critical  [  ] Serious  [  ]Fair  [  X]Good      SPECIMENS REMOVED (IF APPLICABLE):  none    IMPLANTS (IF APPLICABLE)  none    FINDINGS: none    COMPLICATIONS: none     PLAN OF CARE  - occluded subclavian  - will review the case for possible balanoplasty                   
I have personally seen and examined the patient.  I agree with the history and physical which I have reviewed and noted any changes below.

## 2025-07-23 ENCOUNTER — OUTPATIENT (OUTPATIENT)
Dept: OUTPATIENT SERVICES | Facility: HOSPITAL | Age: 80
LOS: 1 days | End: 2025-07-23
Payer: MEDICARE

## 2025-07-23 VITALS
WEIGHT: 134.92 LBS | TEMPERATURE: 98 F | OXYGEN SATURATION: 97 % | RESPIRATION RATE: 18 BRPM | SYSTOLIC BLOOD PRESSURE: 120 MMHG | HEART RATE: 90 BPM | HEIGHT: 62 IN | DIASTOLIC BLOOD PRESSURE: 84 MMHG

## 2025-07-23 DIAGNOSIS — Z95.0 PRESENCE OF CARDIAC PACEMAKER: Chronic | ICD-10-CM

## 2025-07-23 DIAGNOSIS — I49.5 SICK SINUS SYNDROME: ICD-10-CM

## 2025-07-23 DIAGNOSIS — Z98.890 OTHER SPECIFIED POSTPROCEDURAL STATES: Chronic | ICD-10-CM

## 2025-07-23 DIAGNOSIS — Z01.818 ENCOUNTER FOR OTHER PREPROCEDURAL EXAMINATION: ICD-10-CM

## 2025-07-23 LAB
ALBUMIN SERPL ELPH-MCNC: 4.6 G/DL — SIGNIFICANT CHANGE UP (ref 3.5–5.2)
ALP SERPL-CCNC: 99 U/L — SIGNIFICANT CHANGE UP (ref 30–115)
ALT FLD-CCNC: 26 U/L — SIGNIFICANT CHANGE UP (ref 0–41)
ANION GAP SERPL CALC-SCNC: 16 MMOL/L — HIGH (ref 7–14)
APPEARANCE UR: CLEAR — SIGNIFICANT CHANGE UP
AST SERPL-CCNC: 31 U/L — SIGNIFICANT CHANGE UP (ref 0–41)
BASOPHILS # BLD AUTO: 0.06 K/UL — SIGNIFICANT CHANGE UP (ref 0–0.2)
BASOPHILS NFR BLD AUTO: 0.9 % — SIGNIFICANT CHANGE UP (ref 0–1)
BILIRUB SERPL-MCNC: 0.9 MG/DL — SIGNIFICANT CHANGE UP (ref 0.2–1.2)
BILIRUB UR-MCNC: NEGATIVE — SIGNIFICANT CHANGE UP
BLD GP AB SCN SERPL QL: SIGNIFICANT CHANGE UP
BUN SERPL-MCNC: 36 MG/DL — HIGH (ref 10–20)
CALCIUM SERPL-MCNC: 9.6 MG/DL — SIGNIFICANT CHANGE UP (ref 8.4–10.5)
CHLORIDE SERPL-SCNC: 97 MMOL/L — LOW (ref 98–110)
CO2 SERPL-SCNC: 21 MMOL/L — SIGNIFICANT CHANGE UP (ref 17–32)
COLOR SPEC: YELLOW — SIGNIFICANT CHANGE UP
CREAT SERPL-MCNC: 1.3 MG/DL — SIGNIFICANT CHANGE UP (ref 0.7–1.5)
DIFF PNL FLD: NEGATIVE — SIGNIFICANT CHANGE UP
EGFR: 42 ML/MIN/1.73M2 — LOW
EGFR: 42 ML/MIN/1.73M2 — LOW
EOSINOPHIL # BLD AUTO: 0.1 K/UL — SIGNIFICANT CHANGE UP (ref 0–0.7)
EOSINOPHIL NFR BLD AUTO: 1.5 % — SIGNIFICANT CHANGE UP (ref 0–8)
GLUCOSE SERPL-MCNC: 126 MG/DL — HIGH (ref 70–99)
GLUCOSE UR QL: NEGATIVE MG/DL — SIGNIFICANT CHANGE UP
HCT VFR BLD CALC: 36.2 % — LOW (ref 37–47)
HGB BLD-MCNC: 12.3 G/DL — SIGNIFICANT CHANGE UP (ref 12–16)
IMM GRANULOCYTES NFR BLD AUTO: 0.3 % — SIGNIFICANT CHANGE UP (ref 0.1–0.3)
KETONES UR QL: NEGATIVE MG/DL — SIGNIFICANT CHANGE UP
LEUKOCYTE ESTERASE UR-ACNC: NEGATIVE — SIGNIFICANT CHANGE UP
LYMPHOCYTES # BLD AUTO: 1.71 K/UL — SIGNIFICANT CHANGE UP (ref 1.2–3.4)
LYMPHOCYTES # BLD AUTO: 26.1 % — SIGNIFICANT CHANGE UP (ref 20.5–51.1)
MCHC RBC-ENTMCNC: 32.6 PG — HIGH (ref 27–31)
MCHC RBC-ENTMCNC: 34 G/DL — SIGNIFICANT CHANGE UP (ref 32–37)
MCV RBC AUTO: 96 FL — SIGNIFICANT CHANGE UP (ref 81–99)
MONOCYTES # BLD AUTO: 0.59 K/UL — SIGNIFICANT CHANGE UP (ref 0.1–0.6)
MONOCYTES NFR BLD AUTO: 9 % — SIGNIFICANT CHANGE UP (ref 1.7–9.3)
MRSA PCR RESULT.: SIGNIFICANT CHANGE UP
NEUTROPHILS # BLD AUTO: 4.07 K/UL — SIGNIFICANT CHANGE UP (ref 1.4–6.5)
NEUTROPHILS NFR BLD AUTO: 62.2 % — SIGNIFICANT CHANGE UP (ref 42.2–75.2)
NITRITE UR-MCNC: NEGATIVE — SIGNIFICANT CHANGE UP
NRBC BLD AUTO-RTO: 0 /100 WBCS — SIGNIFICANT CHANGE UP (ref 0–0)
PH UR: 6 — SIGNIFICANT CHANGE UP (ref 5–8)
PLATELET # BLD AUTO: 188 K/UL — SIGNIFICANT CHANGE UP (ref 130–400)
PMV BLD: 11.9 FL — HIGH (ref 7.4–10.4)
POTASSIUM SERPL-MCNC: 4.7 MMOL/L — SIGNIFICANT CHANGE UP (ref 3.5–5)
POTASSIUM SERPL-SCNC: 4.7 MMOL/L — SIGNIFICANT CHANGE UP (ref 3.5–5)
PROT SERPL-MCNC: 7.3 G/DL — SIGNIFICANT CHANGE UP (ref 6–8)
PROT UR-MCNC: NEGATIVE MG/DL — SIGNIFICANT CHANGE UP
RBC # BLD: 3.77 M/UL — LOW (ref 4.2–5.4)
RBC # FLD: 14.9 % — HIGH (ref 11.5–14.5)
SODIUM SERPL-SCNC: 134 MMOL/L — LOW (ref 135–146)
SP GR SPEC: 1.01 — SIGNIFICANT CHANGE UP (ref 1–1.03)
UROBILINOGEN FLD QL: 0.2 MG/DL — SIGNIFICANT CHANGE UP (ref 0.2–1)
WBC # BLD: 6.55 K/UL — SIGNIFICANT CHANGE UP (ref 4.8–10.8)
WBC # FLD AUTO: 6.55 K/UL — SIGNIFICANT CHANGE UP (ref 4.8–10.8)

## 2025-07-23 PROCEDURE — 80053 COMPREHEN METABOLIC PANEL: CPT

## 2025-07-23 PROCEDURE — 86901 BLOOD TYPING SEROLOGIC RH(D): CPT

## 2025-07-23 PROCEDURE — 36415 COLL VENOUS BLD VENIPUNCTURE: CPT

## 2025-07-23 PROCEDURE — 87086 URINE CULTURE/COLONY COUNT: CPT

## 2025-07-23 PROCEDURE — 81003 URINALYSIS AUTO W/O SCOPE: CPT

## 2025-07-23 PROCEDURE — 86900 BLOOD TYPING SEROLOGIC ABO: CPT

## 2025-07-23 PROCEDURE — 87641 MR-STAPH DNA AMP PROBE: CPT

## 2025-07-23 PROCEDURE — 86850 RBC ANTIBODY SCREEN: CPT

## 2025-07-23 PROCEDURE — 99214 OFFICE O/P EST MOD 30 MIN: CPT | Mod: 25

## 2025-07-23 PROCEDURE — 87640 STAPH A DNA AMP PROBE: CPT

## 2025-07-23 PROCEDURE — 85025 COMPLETE CBC W/AUTO DIFF WBC: CPT

## 2025-07-24 DIAGNOSIS — Z01.818 ENCOUNTER FOR OTHER PREPROCEDURAL EXAMINATION: ICD-10-CM

## 2025-07-24 LAB
CULTURE RESULTS: SIGNIFICANT CHANGE UP
SPECIMEN SOURCE: SIGNIFICANT CHANGE UP

## 2025-07-30 ENCOUNTER — INPATIENT (INPATIENT)
Facility: HOSPITAL | Age: 80
LOS: 0 days | Discharge: HOME | DRG: 310 | End: 2025-07-31
Attending: STUDENT IN AN ORGANIZED HEALTH CARE EDUCATION/TRAINING PROGRAM | Admitting: STUDENT IN AN ORGANIZED HEALTH CARE EDUCATION/TRAINING PROGRAM
Payer: MEDICARE

## 2025-07-30 ENCOUNTER — TRANSCRIPTION ENCOUNTER (OUTPATIENT)
Age: 80
End: 2025-07-30

## 2025-07-30 ENCOUNTER — APPOINTMENT (OUTPATIENT)
Dept: ELECTROPHYSIOLOGY | Facility: HOSPITAL | Age: 80
End: 2025-07-30

## 2025-07-30 VITALS
HEIGHT: 62 IN | HEART RATE: 70 BPM | RESPIRATION RATE: 16 BRPM | SYSTOLIC BLOOD PRESSURE: 165 MMHG | WEIGHT: 138.01 LBS | DIASTOLIC BLOOD PRESSURE: 82 MMHG | OXYGEN SATURATION: 98 %

## 2025-07-30 DIAGNOSIS — Z98.890 OTHER SPECIFIED POSTPROCEDURAL STATES: Chronic | ICD-10-CM

## 2025-07-30 DIAGNOSIS — I48.91 UNSPECIFIED ATRIAL FIBRILLATION: ICD-10-CM

## 2025-07-30 DIAGNOSIS — I49.5 SICK SINUS SYNDROME: ICD-10-CM

## 2025-07-30 DIAGNOSIS — Z95.0 PRESENCE OF CARDIAC PACEMAKER: Chronic | ICD-10-CM

## 2025-07-30 LAB — BLD GP AB SCN SERPL QL: SIGNIFICANT CHANGE UP

## 2025-07-30 PROCEDURE — 86850 RBC ANTIBODY SCREEN: CPT

## 2025-07-30 PROCEDURE — 85027 COMPLETE CBC AUTOMATED: CPT

## 2025-07-30 PROCEDURE — 86901 BLOOD TYPING SEROLOGIC RH(D): CPT

## 2025-07-30 PROCEDURE — 71046 X-RAY EXAM CHEST 2 VIEWS: CPT

## 2025-07-30 PROCEDURE — C1889: CPT

## 2025-07-30 PROCEDURE — 86900 BLOOD TYPING SEROLOGIC ABO: CPT

## 2025-07-30 PROCEDURE — 33208 INSRT HEART PM ATRIAL & VENT: CPT

## 2025-07-30 PROCEDURE — C1769: CPT

## 2025-07-30 PROCEDURE — C2621: CPT

## 2025-07-30 PROCEDURE — 33225 L VENTRIC PACING LEAD ADD-ON: CPT

## 2025-07-30 PROCEDURE — 33233 REMOVAL OF PM GENERATOR: CPT

## 2025-07-30 PROCEDURE — C1892: CPT

## 2025-07-30 PROCEDURE — C1900: CPT

## 2025-07-30 PROCEDURE — 36415 COLL VENOUS BLD VENIPUNCTURE: CPT

## 2025-07-30 PROCEDURE — 93005 ELECTROCARDIOGRAM TRACING: CPT

## 2025-07-30 RX ORDER — ONDANSETRON HCL/PF 4 MG/2 ML
4 VIAL (ML) INJECTION ONCE
Refills: 0 | Status: COMPLETED | OUTPATIENT
Start: 2025-07-30 | End: 2025-07-30

## 2025-07-30 RX ORDER — FUROSEMIDE 10 MG/ML
1 INJECTION INTRAMUSCULAR; INTRAVENOUS
Refills: 0 | DISCHARGE

## 2025-07-30 RX ORDER — METOPROLOL SUCCINATE 50 MG/1
50 TABLET, EXTENDED RELEASE ORAL THREE TIMES A DAY
Refills: 0 | Status: DISCONTINUED | OUTPATIENT
Start: 2025-07-30 | End: 2025-07-31

## 2025-07-30 RX ORDER — ATORVASTATIN CALCIUM 80 MG/1
10 TABLET, FILM COATED ORAL AT BEDTIME
Refills: 0 | Status: DISCONTINUED | OUTPATIENT
Start: 2025-07-30 | End: 2025-07-31

## 2025-07-30 RX ORDER — CEFAZOLIN SODIUM IN 0.9 % NACL 3 G/100 ML
1000 INTRAVENOUS SOLUTION, PIGGYBACK (ML) INTRAVENOUS EVERY 8 HOURS
Refills: 0 | Status: COMPLETED | OUTPATIENT
Start: 2025-07-30 | End: 2025-07-31

## 2025-07-30 RX ORDER — CEFAZOLIN SODIUM IN 0.9 % NACL 3 G/100 ML
1000 INTRAVENOUS SOLUTION, PIGGYBACK (ML) INTRAVENOUS ONCE
Refills: 0 | Status: COMPLETED | OUTPATIENT
Start: 2025-07-30 | End: 2025-07-30

## 2025-07-30 RX ORDER — CEFAZOLIN SODIUM IN 0.9 % NACL 3 G/100 ML
INTRAVENOUS SOLUTION, PIGGYBACK (ML) INTRAVENOUS
Refills: 0 | Status: COMPLETED | OUTPATIENT
Start: 2025-07-30 | End: 2025-07-31

## 2025-07-30 RX ORDER — CEFAZOLIN SODIUM IN 0.9 % NACL 3 G/100 ML
2000 INTRAVENOUS SOLUTION, PIGGYBACK (ML) INTRAVENOUS ONCE
Refills: 0 | Status: COMPLETED | OUTPATIENT
Start: 2025-07-30 | End: 2025-07-30

## 2025-07-30 RX ORDER — FUROSEMIDE 10 MG/ML
20 INJECTION INTRAMUSCULAR; INTRAVENOUS DAILY
Refills: 0 | Status: DISCONTINUED | OUTPATIENT
Start: 2025-07-30 | End: 2025-07-31

## 2025-07-30 RX ORDER — LOSARTAN POTASSIUM 100 MG/1
50 TABLET, FILM COATED ORAL DAILY
Refills: 0 | Status: DISCONTINUED | OUTPATIENT
Start: 2025-07-30 | End: 2025-07-31

## 2025-07-30 RX ORDER — ALBUTEROL SULFATE 2.5 MG/3ML
2 VIAL, NEBULIZER (ML) INHALATION
Refills: 0 | DISCHARGE

## 2025-07-30 RX ADMIN — Medication 100 MILLIGRAM(S): at 22:36

## 2025-07-30 RX ADMIN — ATORVASTATIN CALCIUM 10 MILLIGRAM(S): 80 TABLET, FILM COATED ORAL at 22:37

## 2025-07-30 RX ADMIN — Medication 4 MILLIGRAM(S): at 18:47

## 2025-07-30 RX ADMIN — Medication 100 MILLIGRAM(S): at 17:44

## 2025-07-30 RX ADMIN — METOPROLOL SUCCINATE 50 MILLIGRAM(S): 50 TABLET, EXTENDED RELEASE ORAL at 22:37

## 2025-07-31 ENCOUNTER — TRANSCRIPTION ENCOUNTER (OUTPATIENT)
Age: 80
End: 2025-07-31

## 2025-07-31 VITALS
RESPIRATION RATE: 20 BRPM | SYSTOLIC BLOOD PRESSURE: 144 MMHG | HEART RATE: 71 BPM | DIASTOLIC BLOOD PRESSURE: 67 MMHG | TEMPERATURE: 98 F | OXYGEN SATURATION: 95 %

## 2025-07-31 LAB
HCT VFR BLD CALC: 30.4 % — LOW (ref 37–47)
HGB BLD-MCNC: 10.1 G/DL — LOW (ref 12–16)
MCHC RBC-ENTMCNC: 32.1 PG — HIGH (ref 27–31)
MCHC RBC-ENTMCNC: 33.2 G/DL — SIGNIFICANT CHANGE UP (ref 32–37)
MCV RBC AUTO: 96.5 FL — SIGNIFICANT CHANGE UP (ref 81–99)
NRBC BLD AUTO-RTO: 0 /100 WBCS — SIGNIFICANT CHANGE UP (ref 0–0)
PLATELET # BLD AUTO: 144 K/UL — SIGNIFICANT CHANGE UP (ref 130–400)
PMV BLD: 11.6 FL — HIGH (ref 7.4–10.4)
RBC # BLD: 3.15 M/UL — LOW (ref 4.2–5.4)
RBC # FLD: 15.1 % — HIGH (ref 11.5–14.5)
WBC # BLD: 6.57 K/UL — SIGNIFICANT CHANGE UP (ref 4.8–10.8)
WBC # FLD AUTO: 6.57 K/UL — SIGNIFICANT CHANGE UP (ref 4.8–10.8)

## 2025-07-31 PROCEDURE — 71046 X-RAY EXAM CHEST 2 VIEWS: CPT | Mod: 26

## 2025-07-31 PROCEDURE — 93010 ELECTROCARDIOGRAM REPORT: CPT

## 2025-07-31 PROCEDURE — 99239 HOSP IP/OBS DSCHRG MGMT >30: CPT

## 2025-07-31 RX ORDER — CEPHALEXIN 250 MG/1
1 CAPSULE ORAL
Qty: 10 | Refills: 0
Start: 2025-07-31 | End: 2025-08-04

## 2025-07-31 RX ADMIN — Medication 40 MILLIGRAM(S): at 10:58

## 2025-07-31 RX ADMIN — Medication 100 MILLIGRAM(S): at 05:28

## 2025-07-31 RX ADMIN — Medication 1 APPLICATION(S): at 05:27

## 2025-07-31 RX ADMIN — FUROSEMIDE 20 MILLIGRAM(S): 10 INJECTION INTRAMUSCULAR; INTRAVENOUS at 05:27

## 2025-07-31 RX ADMIN — LOSARTAN POTASSIUM 50 MILLIGRAM(S): 100 TABLET, FILM COATED ORAL at 05:27

## 2025-07-31 RX ADMIN — METOPROLOL SUCCINATE 50 MILLIGRAM(S): 50 TABLET, EXTENDED RELEASE ORAL at 05:27

## 2025-08-04 ENCOUNTER — NON-APPOINTMENT (OUTPATIENT)
Age: 80
End: 2025-08-04

## 2025-09-05 ENCOUNTER — APPOINTMENT (OUTPATIENT)
Dept: ELECTROPHYSIOLOGY | Facility: CLINIC | Age: 80
End: 2025-09-05

## 2025-09-05 VITALS
SYSTOLIC BLOOD PRESSURE: 143 MMHG | BODY MASS INDEX: 22.53 KG/M2 | DIASTOLIC BLOOD PRESSURE: 80 MMHG | HEART RATE: 109 BPM | HEIGHT: 64 IN | WEIGHT: 132 LBS

## 2025-09-05 PROBLEM — J44.9 CHRONIC OBSTRUCTIVE PULMONARY DISEASE, UNSPECIFIED: Chronic | Status: ACTIVE | Noted: 2025-07-23

## 2025-09-05 PROCEDURE — G2211 COMPLEX E/M VISIT ADD ON: CPT

## 2025-09-05 PROCEDURE — 99213 OFFICE O/P EST LOW 20 MIN: CPT

## 2025-09-05 PROCEDURE — 93281 PM DEVICE PROGR EVAL MULTI: CPT

## 2025-09-05 RX ORDER — METOPROLOL SUCCINATE 100 MG/1
100 TABLET, EXTENDED RELEASE ORAL DAILY
Qty: 90 | Refills: 3 | Status: ACTIVE | COMMUNITY
Start: 2025-09-05 | End: 1900-01-01

## 2025-09-05 RX ORDER — DIGOXIN 125 UG/1
125 TABLET ORAL DAILY
Qty: 90 | Refills: 3 | Status: ACTIVE | COMMUNITY
Start: 2025-09-05 | End: 1900-01-01

## 2025-09-05 RX ORDER — VIBEGRON 75 MG/1
75 TABLET, FILM COATED ORAL
Refills: 0 | Status: ACTIVE | COMMUNITY

## 2025-09-11 LAB
ALBUMIN SERPL ELPH-MCNC: 4.5 G/DL
ALP BLD-CCNC: 97 U/L
ALT SERPL-CCNC: 20 U/L
ANION GAP SERPL CALC-SCNC: 15 MMOL/L
AST SERPL-CCNC: 25 U/L
BILIRUB SERPL-MCNC: 0.9 MG/DL
BUN SERPL-MCNC: 24 MG/DL
CALCIUM SERPL-MCNC: 10 MG/DL
CHLORIDE SERPL-SCNC: 100 MMOL/L
CO2 SERPL-SCNC: 20 MMOL/L
CREAT SERPL-MCNC: 1.1 MG/DL
DIGOXIN SERPL-MCNC: 0.7 NG/ML
EGFRCR SERPLBLD CKD-EPI 2021: 51 ML/MIN/1.73M2
GLUCOSE SERPL-MCNC: 92 MG/DL
POTASSIUM SERPL-SCNC: 4.5 MMOL/L
PROT SERPL-MCNC: 7.5 G/DL
SODIUM SERPL-SCNC: 135 MMOL/L

## 2025-09-12 ENCOUNTER — APPOINTMENT (OUTPATIENT)
Dept: ELECTROPHYSIOLOGY | Facility: CLINIC | Age: 80
End: 2025-09-12
Payer: MEDICARE

## 2025-09-12 VITALS
WEIGHT: 133 LBS | BODY MASS INDEX: 22.71 KG/M2 | HEART RATE: 100 BPM | SYSTOLIC BLOOD PRESSURE: 137 MMHG | HEIGHT: 64 IN | DIASTOLIC BLOOD PRESSURE: 77 MMHG

## 2025-09-12 DIAGNOSIS — I48.91 UNSPECIFIED ATRIAL FIBRILLATION: ICD-10-CM

## 2025-09-12 DIAGNOSIS — Z45.018 ENCOUNTER FOR ADJUSTMENT AND MANAGEMENT OF OTHER PART OF CARDIAC PACEMAKER: ICD-10-CM

## 2025-09-12 DIAGNOSIS — I44.2 ATRIOVENTRICULAR BLOCK, COMPLETE: ICD-10-CM

## 2025-09-12 DIAGNOSIS — I49.5 SICK SINUS SYNDROME: ICD-10-CM

## 2025-09-12 PROCEDURE — 93000 ELECTROCARDIOGRAM COMPLETE: CPT | Mod: 59

## 2025-09-12 PROCEDURE — 93290 INTERROG DEV EVAL ICPMS IP: CPT

## 2025-09-12 PROCEDURE — 99214 OFFICE O/P EST MOD 30 MIN: CPT

## 2025-09-12 PROCEDURE — 93281 PM DEVICE PROGR EVAL MULTI: CPT
